# Patient Record
Sex: FEMALE | Race: WHITE | NOT HISPANIC OR LATINO | Employment: FULL TIME | URBAN - METROPOLITAN AREA
[De-identification: names, ages, dates, MRNs, and addresses within clinical notes are randomized per-mention and may not be internally consistent; named-entity substitution may affect disease eponyms.]

---
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
## 2019-03-03 ENCOUNTER — APPOINTMENT (EMERGENCY)
Dept: RADIOLOGY | Facility: HOSPITAL | Age: 19
End: 2019-03-03
Payer: COMMERCIAL

## 2019-03-03 ENCOUNTER — HOSPITAL ENCOUNTER (EMERGENCY)
Facility: HOSPITAL | Age: 19
Discharge: HOME | End: 2019-03-03
Payer: COMMERCIAL

## 2019-03-03 VITALS
BODY MASS INDEX: 34.86 KG/M2 | HEART RATE: 88 BPM | TEMPERATURE: 99.3 F | RESPIRATION RATE: 16 BRPM | WEIGHT: 230 LBS | SYSTOLIC BLOOD PRESSURE: 124 MMHG | DIASTOLIC BLOOD PRESSURE: 72 MMHG | HEIGHT: 68 IN | OXYGEN SATURATION: 99 %

## 2019-03-03 DIAGNOSIS — G89.29 CHRONIC PAIN OF RIGHT KNEE: ICD-10-CM

## 2019-03-03 DIAGNOSIS — M25.561 CHRONIC PAIN OF RIGHT KNEE: ICD-10-CM

## 2019-03-03 DIAGNOSIS — H65.90 SEROUS OTITIS MEDIA: Primary | ICD-10-CM

## 2019-03-03 PROCEDURE — 99283 EMERGENCY DEPT VISIT LOW MDM: CPT

## 2019-03-03 PROCEDURE — 73562 X-RAY EXAM OF KNEE 3: CPT

## 2019-03-03 RX ORDER — DEXTROAMPHETAMINE SACCHARATE, AMPHETAMINE ASPARTATE MONOHYDRATE, DEXTROAMPHETAMINE SULFATE AND AMPHETAMINE SULFATE 7.5; 7.5; 7.5; 7.5 MG/1; MG/1; MG/1; MG/1
1 CAPSULE, EXTENDED RELEASE ORAL DAILY
COMMUNITY
Start: 2016-01-15 | End: 2020-11-23

## 2019-03-03 RX ORDER — NAPROXEN 500 MG/1
500 TABLET ORAL 2 TIMES DAILY WITH MEALS
Qty: 30 TABLET | Refills: 0 | Status: SHIPPED | OUTPATIENT
Start: 2019-03-03 | End: 2020-11-23

## 2019-03-03 NOTE — ED PROVIDER NOTES
History  Chief Complaint   Patient presents with    Knee Pain     right; no injury or trauma to same; hurting "for a long time"    Earache     left     Patient presents with chronic right knee pain that has been worsening over the past few days  Pain is 7/10, constant, over medial joint line and patella, no provocation noted, ibuprofen 600mg last night did not help  Denies known injury, has never seen a provider for this issue  Also c/o 3 day history of left ear feeling clogged, had URI that resolved last week  History provided by:  Parent and patient  Knee Pain   Pain details:     Quality:  Aching    Radiates to:  Does not radiate    Severity:  Moderate    Onset quality:  Gradual    Timing:  Constant    Progression:  Worsening  Chronicity:  Chronic  Dislocation: no    Prior injury to area:  No  Relieved by:  Nothing  Worsened by:  Nothing  Ineffective treatments:  NSAIDs  Associated symptoms: no back pain, no decreased ROM, no fever, no itching and no swelling    Earache   Associated symptoms: no abdominal pain, no congestion, no cough, no diarrhea, no fever, no headaches, no rash, no rhinorrhea, no sore throat and no vomiting        Prior to Admission Medications   Prescriptions Last Dose Informant Patient Reported? Taking? amphetamine-dextroamphetamine (ADDERALL XR) 30 MG 24 hr capsule   Yes Yes   Sig: Take 1 capsule by mouth daily      Facility-Administered Medications: None       Past Medical History:   Diagnosis Date    ADHD (attention deficit hyperactivity disorder)        Past Surgical History:   Procedure Laterality Date    WRIST SURGERY Right        History reviewed  No pertinent family history  I have reviewed and agree with the history as documented      Social History     Tobacco Use    Smoking status: Never Smoker    Smokeless tobacco: Never Used   Substance Use Topics    Alcohol use: Never     Frequency: Never    Drug use: Never        Review of Systems   Constitutional: Negative for chills and fever  HENT: Positive for ear pain  Negative for congestion, rhinorrhea and sore throat  Eyes: Negative for visual disturbance  Respiratory: Negative for cough  Cardiovascular: Negative for chest pain  Gastrointestinal: Negative for abdominal pain, diarrhea, nausea and vomiting  Genitourinary: Negative for dysuria  Musculoskeletal: Negative for back pain  Skin: Negative for itching and rash  Neurological: Negative for headaches  Physical Exam  Physical Exam   Constitutional: She is oriented to person, place, and time  She appears well-developed and well-nourished  HENT:   Head: Normocephalic and atraumatic  Right Ear: External ear normal    Left Ear: External ear normal    Nose: Nose normal    Mouth/Throat: Oropharynx is clear and moist    Left TM with mild dullness and fluid behind   Eyes: Pupils are equal, round, and reactive to light  Conjunctivae and EOM are normal    Neck: Normal range of motion  Neck supple  Cardiovascular: Normal rate, regular rhythm, normal heart sounds and intact distal pulses  Pulmonary/Chest: Effort normal and breath sounds normal    Musculoskeletal: Normal range of motion  She exhibits tenderness  She exhibits no edema  Right knee with intact ROM, non-tender, no ligamentous laxity noted, negative Apley grind   Neurological: She is alert and oriented to person, place, and time  No cranial nerve deficit  Skin: Skin is warm and dry  Capillary refill takes less than 2 seconds  Psychiatric: She has a normal mood and affect  Nursing note and vitals reviewed        Vital Signs  ED Triage Vitals [03/03/19 1318]   Temperature Pulse Respirations Blood Pressure SpO2   99 3 °F (37 4 °C) 88 16 124/72 99 %      Temp Source Heart Rate Source Patient Position - Orthostatic VS BP Location FiO2 (%)   Temporal Monitor Sitting Right arm --      Pain Score       7           Vitals:    03/03/19 1318   BP: 124/72   Pulse: 88   Patient Position - Orthostatic VS: Sitting       Visual Acuity      ED Medications  Medications - No data to display    Diagnostic Studies  Results Reviewed     None                 XR knee 3 views right non injury   ED Interpretation by Angel Hughes PA-C (03/03 1283)   NAD                 Procedures  Procedures       Phone Contacts  ED Phone Contact    ED Course                               MDM    Disposition  Final diagnoses:   Serous otitis media   Chronic pain of right knee     Time reflects when diagnosis was documented in both MDM as applicable and the Disposition within this note     Time User Action Codes Description Comment    3/3/2019  2:29 PM Mik JUNG Add [H65 90] Serous otitis media     3/3/2019  2:29 PM Doug Madelaine Add [M43 753,  G89 29] Chronic pain of right knee       ED Disposition     ED Disposition Condition Date/Time Comment    Discharge Stable Sun Mar 3, 2019  2:29 PM Lashae Plaster discharge to home/self care  Follow-up Information     Follow up With Specialties Details Why Contact Info    Doug Evans MD Orthopedic Surgery, Orthopedics Schedule an appointment as soon as possible for a visit   [address] [place] [address]  [phone]            Patient's Medications   Discharge Prescriptions    NAPROXEN (NAPROSYN) 500 MG TABLET    Take 1 tablet (500 mg total) by mouth 2 (two) times a day with meals       Start Date: 3/3/2019  End Date: --       Order Dose: 500 mg       Quantity: 30 tablet    Refills: 0     No discharge procedures on file      ED Provider  Electronically Signed by           Angel Hughes PA-C  03/03/19 2474

## 2020-10-12 ENCOUNTER — OFFICE VISIT (OUTPATIENT)
Dept: URGENT CARE | Facility: CLINIC | Age: 20
End: 2020-10-12
Payer: COMMERCIAL

## 2020-10-12 ENCOUNTER — HOSPITAL ENCOUNTER (OUTPATIENT)
Dept: RADIOLOGY | Facility: HOSPITAL | Age: 20
Discharge: HOME | End: 2020-10-12

## 2020-10-12 ENCOUNTER — APPOINTMENT (OUTPATIENT)
Dept: URGENT CARE | Facility: CLINIC | Age: 20
End: 2020-10-12
Payer: COMMERCIAL

## 2020-10-12 VITALS
OXYGEN SATURATION: 99 % | TEMPERATURE: 98.7 F | HEART RATE: 88 BPM | SYSTOLIC BLOOD PRESSURE: 139 MMHG | BODY MASS INDEX: 39.24 KG/M2 | HEIGHT: 67 IN | DIASTOLIC BLOOD PRESSURE: 86 MMHG | WEIGHT: 250 LBS | RESPIRATION RATE: 18 BRPM

## 2020-10-12 DIAGNOSIS — S93.491A SPRAIN OF ANTERIOR TALOFIBULAR LIGAMENT OF RIGHT ANKLE, INITIAL ENCOUNTER: Primary | ICD-10-CM

## 2020-10-12 DIAGNOSIS — S93.491A SPRAIN OF ANTERIOR TALOFIBULAR LIGAMENT OF RIGHT ANKLE, INITIAL ENCOUNTER: ICD-10-CM

## 2020-10-12 PROCEDURE — 99213 OFFICE O/P EST LOW 20 MIN: CPT

## 2020-11-23 ENCOUNTER — OFFICE VISIT (OUTPATIENT)
Dept: FAMILY MEDICINE CLINIC | Facility: CLINIC | Age: 20
End: 2020-11-23
Payer: COMMERCIAL

## 2020-11-23 ENCOUNTER — APPOINTMENT (OUTPATIENT)
Dept: LAB | Facility: CLINIC | Age: 20
End: 2020-11-23
Payer: COMMERCIAL

## 2020-11-23 VITALS
OXYGEN SATURATION: 98 % | BODY MASS INDEX: 41.98 KG/M2 | SYSTOLIC BLOOD PRESSURE: 124 MMHG | HEIGHT: 68 IN | WEIGHT: 277 LBS | HEART RATE: 94 BPM | DIASTOLIC BLOOD PRESSURE: 78 MMHG | TEMPERATURE: 97.6 F

## 2020-11-23 DIAGNOSIS — Z00.00 ANNUAL PHYSICAL EXAM: Primary | ICD-10-CM

## 2020-11-23 DIAGNOSIS — Z13.1 DIABETES MELLITUS SCREENING: ICD-10-CM

## 2020-11-23 DIAGNOSIS — Z13.220 LIPID SCREENING: ICD-10-CM

## 2020-11-23 DIAGNOSIS — Z76.89 ENCOUNTER TO ESTABLISH CARE: ICD-10-CM

## 2020-11-23 DIAGNOSIS — Z13.0 SCREENING FOR DEFICIENCY ANEMIA: ICD-10-CM

## 2020-11-23 DIAGNOSIS — E66.01 MORBID OBESITY WITH BMI OF 40.0-44.9, ADULT (HCC): ICD-10-CM

## 2020-11-23 DIAGNOSIS — Z13.29 THYROID DISORDER SCREEN: ICD-10-CM

## 2020-11-23 LAB
ALBUMIN SERPL BCP-MCNC: 4 G/DL (ref 3.5–5)
ALP SERPL-CCNC: 44 U/L (ref 46–116)
ALT SERPL W P-5'-P-CCNC: 19 U/L (ref 12–78)
ANION GAP SERPL CALCULATED.3IONS-SCNC: 6 MMOL/L (ref 4–13)
AST SERPL W P-5'-P-CCNC: 10 U/L (ref 5–45)
BASOPHILS # BLD AUTO: 0.05 THOUSANDS/ΜL (ref 0–0.1)
BASOPHILS NFR BLD AUTO: 1 % (ref 0–1)
BILIRUB SERPL-MCNC: 0.29 MG/DL (ref 0.2–1)
BUN SERPL-MCNC: 13 MG/DL (ref 5–25)
CALCIUM SERPL-MCNC: 9.1 MG/DL (ref 8.3–10.1)
CHLORIDE SERPL-SCNC: 108 MMOL/L (ref 100–108)
CHOLEST SERPL-MCNC: 192 MG/DL (ref 50–200)
CO2 SERPL-SCNC: 24 MMOL/L (ref 21–32)
CREAT SERPL-MCNC: 0.79 MG/DL (ref 0.6–1.3)
EOSINOPHIL # BLD AUTO: 0.12 THOUSAND/ΜL (ref 0–0.61)
EOSINOPHIL NFR BLD AUTO: 2 % (ref 0–6)
ERYTHROCYTE [DISTWIDTH] IN BLOOD BY AUTOMATED COUNT: 11.9 % (ref 11.6–15.1)
EST. AVERAGE GLUCOSE BLD GHB EST-MCNC: 94 MG/DL
GFR SERPL CREATININE-BSD FRML MDRD: 107 ML/MIN/1.73SQ M
GLUCOSE P FAST SERPL-MCNC: 76 MG/DL (ref 65–99)
HBA1C MFR BLD: 4.9 %
HCT VFR BLD AUTO: 42.4 % (ref 34.8–46.1)
HDLC SERPL-MCNC: 68 MG/DL
HGB BLD-MCNC: 13.8 G/DL (ref 11.5–15.4)
IMM GRANULOCYTES # BLD AUTO: 0.01 THOUSAND/UL (ref 0–0.2)
IMM GRANULOCYTES NFR BLD AUTO: 0 % (ref 0–2)
LDLC SERPL CALC-MCNC: 98 MG/DL (ref 0–100)
LYMPHOCYTES # BLD AUTO: 2.24 THOUSANDS/ΜL (ref 0.6–4.47)
LYMPHOCYTES NFR BLD AUTO: 31 % (ref 14–44)
MCH RBC QN AUTO: 30 PG (ref 26.8–34.3)
MCHC RBC AUTO-ENTMCNC: 32.5 G/DL (ref 31.4–37.4)
MCV RBC AUTO: 92 FL (ref 82–98)
MONOCYTES # BLD AUTO: 0.47 THOUSAND/ΜL (ref 0.17–1.22)
MONOCYTES NFR BLD AUTO: 6 % (ref 4–12)
NEUTROPHILS # BLD AUTO: 4.4 THOUSANDS/ΜL (ref 1.85–7.62)
NEUTS SEG NFR BLD AUTO: 60 % (ref 43–75)
NONHDLC SERPL-MCNC: 124 MG/DL
NRBC BLD AUTO-RTO: 0 /100 WBCS
PLATELET # BLD AUTO: 384 THOUSANDS/UL (ref 149–390)
PMV BLD AUTO: 9.8 FL (ref 8.9–12.7)
POTASSIUM SERPL-SCNC: 4.1 MMOL/L (ref 3.5–5.3)
PROT SERPL-MCNC: 8.1 G/DL (ref 6.4–8.2)
RBC # BLD AUTO: 4.6 MILLION/UL (ref 3.81–5.12)
SODIUM SERPL-SCNC: 138 MMOL/L (ref 136–145)
T4 FREE SERPL-MCNC: 0.93 NG/DL (ref 0.76–1.46)
TRIGL SERPL-MCNC: 129 MG/DL
TSH SERPL DL<=0.05 MIU/L-ACNC: 5.65 UIU/ML (ref 0.36–3.74)
WBC # BLD AUTO: 7.29 THOUSAND/UL (ref 4.31–10.16)

## 2020-11-23 PROCEDURE — 3725F SCREEN DEPRESSION PERFORMED: CPT | Performed by: PHYSICIAN ASSISTANT

## 2020-11-23 PROCEDURE — 80061 LIPID PANEL: CPT

## 2020-11-23 PROCEDURE — 3008F BODY MASS INDEX DOCD: CPT | Performed by: PHYSICIAN ASSISTANT

## 2020-11-23 PROCEDURE — 36415 COLL VENOUS BLD VENIPUNCTURE: CPT

## 2020-11-23 PROCEDURE — 85025 COMPLETE CBC W/AUTO DIFF WBC: CPT

## 2020-11-23 PROCEDURE — 80053 COMPREHEN METABOLIC PANEL: CPT

## 2020-11-23 PROCEDURE — 99385 PREV VISIT NEW AGE 18-39: CPT | Performed by: PHYSICIAN ASSISTANT

## 2020-11-23 PROCEDURE — 83036 HEMOGLOBIN GLYCOSYLATED A1C: CPT

## 2020-11-23 PROCEDURE — 84439 ASSAY OF FREE THYROXINE: CPT

## 2020-11-23 PROCEDURE — 1036F TOBACCO NON-USER: CPT | Performed by: PHYSICIAN ASSISTANT

## 2020-11-23 PROCEDURE — 84443 ASSAY THYROID STIM HORMONE: CPT

## 2020-11-23 RX ORDER — NORETHINDRONE ACETATE AND ETHINYL ESTRADIOL 1.5-30(21)
1 KIT ORAL DAILY
COMMUNITY
Start: 2020-06-23 | End: 2021-06-02 | Stop reason: SDUPTHER

## 2020-12-14 ENCOUNTER — TELEMEDICINE (OUTPATIENT)
Dept: FAMILY MEDICINE CLINIC | Facility: CLINIC | Age: 20
End: 2020-12-14
Payer: COMMERCIAL

## 2020-12-14 DIAGNOSIS — E03.8 SUBCLINICAL HYPOTHYROIDISM: Primary | ICD-10-CM

## 2020-12-14 DIAGNOSIS — R63.5 WEIGHT GAIN: ICD-10-CM

## 2020-12-14 PROCEDURE — 99213 OFFICE O/P EST LOW 20 MIN: CPT | Performed by: PHYSICIAN ASSISTANT

## 2021-01-07 ENCOUNTER — LAB (OUTPATIENT)
Dept: LAB | Facility: CLINIC | Age: 21
End: 2021-01-07
Payer: COMMERCIAL

## 2021-01-07 DIAGNOSIS — R63.5 WEIGHT GAIN: ICD-10-CM

## 2021-01-07 DIAGNOSIS — E03.8 SUBCLINICAL HYPOTHYROIDISM: ICD-10-CM

## 2021-01-07 LAB
T3 SERPL-MCNC: 1.8 NG/ML (ref 0.6–1.8)
T4 FREE SERPL-MCNC: 0.98 NG/DL (ref 0.76–1.46)
TSH SERPL DL<=0.05 MIU/L-ACNC: 6.66 UIU/ML (ref 0.36–3.74)

## 2021-01-07 PROCEDURE — 36415 COLL VENOUS BLD VENIPUNCTURE: CPT

## 2021-01-07 PROCEDURE — 84439 ASSAY OF FREE THYROXINE: CPT

## 2021-01-07 PROCEDURE — 84480 ASSAY TRIIODOTHYRONINE (T3): CPT

## 2021-01-07 PROCEDURE — 84443 ASSAY THYROID STIM HORMONE: CPT

## 2021-01-11 ENCOUNTER — TELEMEDICINE (OUTPATIENT)
Dept: FAMILY MEDICINE CLINIC | Facility: CLINIC | Age: 21
End: 2021-01-11
Payer: COMMERCIAL

## 2021-01-11 DIAGNOSIS — E03.8 SUBCLINICAL HYPOTHYROIDISM: Primary | ICD-10-CM

## 2021-01-11 DIAGNOSIS — R63.5 WEIGHT GAIN: ICD-10-CM

## 2021-01-11 PROCEDURE — 99213 OFFICE O/P EST LOW 20 MIN: CPT | Performed by: PHYSICIAN ASSISTANT

## 2021-01-11 PROCEDURE — 1036F TOBACCO NON-USER: CPT | Performed by: PHYSICIAN ASSISTANT

## 2021-01-11 RX ORDER — LEVOTHYROXINE SODIUM 0.03 MG/1
25 TABLET ORAL DAILY
Qty: 90 TABLET | Refills: 0 | Status: SHIPPED | OUTPATIENT
Start: 2021-01-11 | End: 2021-03-15 | Stop reason: SDUPTHER

## 2021-01-11 NOTE — PROGRESS NOTES
Virtual Regular Visit      Assessment/Plan:    Problem List Items Addressed This Visit     None      Visit Diagnoses     Subclinical hypothyroidism    -  Primary    Relevant Medications    levothyroxine 25 mcg tablet  tsh continues to rise and t4 on low end of normal; will elect to start low dose synthroid as patient does experience some symptoms that could be correlated to thyroid disease  - start taking in mornings on empty stomach, repeat labs in 2 months     Other Relevant Orders    TSH, 3rd generation    T4, free    Thyroid Antibodies Panel    Weight gain        Relevant Medications    levothyroxine 25 mcg tablet    Other Relevant Orders    TSH, 3rd generation    T4, free    Thyroid Antibodies Panel               Reason for visit is No chief complaint on file  Encounter provider Carloz Lincoln PA-C    Provider located at [address] [place] [address]  [address] [facility] [id number]      Recent Visits  No visits were found meeting these conditions  Showing recent visits within past 7 days and meeting all other requirements     Future Appointments  No visits were found meeting these conditions  Showing future appointments within next 150 days and meeting all other requirements        The patient was identified by name and date of birth  Isadora Brittle was informed that this is a telemedicine visit and that the visit is being conducted through [facility] and patient was informed that this is a secure, HIPAA-compliant platform  She agrees to proceed     My office door was closed  No one else was in the room  She acknowledged consent and understanding of privacy and security of the video platform  The patient has agreed to participate and understands they can discontinue the visit at any time  Patient is aware this is a billable service  Subjective  Isadora Brittle is a 23 y o  female who presents for 1 month follow up      HPI   Patient has repeat thyroid labs - tsh still above goal and higher than first  Her t4 is on low end of normal  She does struggle with weight gain and inability to lose  She does have poor growth in hair and nails  Past Medical History:   Diagnosis Date    ADHD (attention deficit hyperactivity disorder)        Past Surgical History:   Procedure Laterality Date    FRACTURE SURGERY      WRIST SURGERY Right        Current Outpatient Medications   Medication Sig Dispense Refill    levothyroxine 25 mcg tablet Take 1 tablet (25 mcg total) by mouth daily 90 tablet 0    norethindrone-ethinyl estradiol-iron (MICROGESTIN FE1 5/30) 1 5-30 MG-MCG tablet Take 1 tablet by mouth daily       No current facility-administered medications for this visit  No Known Allergies    Review of Systems   Constitutional: Positive for unexpected weight change  Negative for appetite change and diaphoresis  Respiratory: Negative  Cardiovascular: Negative  Skin:        Hair and nails poor growth   Neurological: Negative  Video Exam    There were no vitals filed for this visit  Physical Exam  Constitutional:       General: She is not in acute distress  Appearance: Normal appearance  She is obese  HENT:      Head: Normocephalic and atraumatic  Eyes:      Conjunctiva/sclera: Conjunctivae normal       Pupils: Pupils are equal, round, and reactive to light  Pulmonary:      Effort: Pulmonary effort is normal  No respiratory distress  Musculoskeletal:         General: No deformity  Skin:     General: Skin is dry  Coloration: Skin is not pale  Neurological:      General: No focal deficit present  Mental Status: She is alert and oriented to person, place, and time  Psychiatric:         Mood and Affect: Mood normal           I spent 10 minutes directly with the patient during this visit      VIRTUAL VISIT DISCLAIMER    Forest Tobi acknowledges that she has consented to an online visit or consultation   She understands that the online visit is based solely on information provided by her, and that, in the absence of a face-to-face physical evaluation by the physician, the diagnosis she receives is both limited and provisional in terms of accuracy and completeness  This is not intended to replace a full medical face-to-face evaluation by the physician  Sarai Presume understands and accepts these terms

## 2021-02-08 ENCOUNTER — TELEPHONE (OUTPATIENT)
Dept: FAMILY MEDICINE CLINIC | Facility: CLINIC | Age: 21
End: 2021-02-08

## 2021-02-08 DIAGNOSIS — Z20.822 EXPOSURE TO COVID-19 VIRUS: ICD-10-CM

## 2021-02-08 DIAGNOSIS — B34.9 VIRAL INFECTION, UNSPECIFIED: ICD-10-CM

## 2021-02-08 PROCEDURE — U0005 INFEC AGEN DETEC AMPLI PROBE: HCPCS | Performed by: PHYSICIAN ASSISTANT

## 2021-02-08 PROCEDURE — U0003 INFECTIOUS AGENT DETECTION BY NUCLEIC ACID (DNA OR RNA); SEVERE ACUTE RESPIRATORY SYNDROME CORONAVIRUS 2 (SARS-COV-2) (CORONAVIRUS DISEASE [COVID-19]), AMPLIFIED PROBE TECHNIQUE, MAKING USE OF HIGH THROUGHPUT TECHNOLOGIES AS DESCRIBED BY CMS-2020-01-R: HCPCS | Performed by: PHYSICIAN ASSISTANT

## 2021-02-08 NOTE — TELEPHONE ENCOUNTER
When was your exposure? Where was your exposure? At home      What type of exposure? Masked or unmasked, how long were you in contact, were you within 6 feet  No masks and within 6 feet- they all live together      Was it a confirmed exposure or was it a suspected exposure? Confirmed- boyfriend and boyfriends mother- 2/7      Have you started with symptoms? If so, what symptoms do you have and on what date did they start? Symptoms started on 2/3- Coughing, sneezing and headache

## 2021-02-08 NOTE — TELEPHONE ENCOUNTER
Covid testing ordered, recommend virtual visit if needed  Supportive care with tylenol, motrin, vit d/c/zinc  Needs to isolate x 10 days from symptom onset

## 2021-02-09 LAB — SARS-COV-2 RNA RESP QL NAA+PROBE: NEGATIVE

## 2021-02-12 ENCOUNTER — TELEPHONE (OUTPATIENT)
Dept: FAMILY MEDICINE CLINIC | Facility: CLINIC | Age: 21
End: 2021-02-12

## 2021-02-12 NOTE — TELEPHONE ENCOUNTER
Pt called that she needs a work note that she has to quarantine and extra 10 days due to household exposures  She is negative

## 2021-02-12 NOTE — TELEPHONE ENCOUNTER
Last day of quarantine for family members is 2/17/2021   Nannette Hyman must quarantine 10 days past that date making her able to return to work on 2/28/2021

## 2021-03-15 ENCOUNTER — OFFICE VISIT (OUTPATIENT)
Dept: FAMILY MEDICINE CLINIC | Facility: CLINIC | Age: 21
End: 2021-03-15
Payer: COMMERCIAL

## 2021-03-15 VITALS
BODY MASS INDEX: 44.1 KG/M2 | HEIGHT: 68 IN | TEMPERATURE: 98.8 F | OXYGEN SATURATION: 97 % | DIASTOLIC BLOOD PRESSURE: 86 MMHG | SYSTOLIC BLOOD PRESSURE: 124 MMHG | HEART RATE: 96 BPM | WEIGHT: 291 LBS

## 2021-03-15 DIAGNOSIS — E03.8 SUBCLINICAL HYPOTHYROIDISM: ICD-10-CM

## 2021-03-15 DIAGNOSIS — R63.5 EXCESSIVE BODY WEIGHT GAIN: ICD-10-CM

## 2021-03-15 DIAGNOSIS — F90.0 ATTENTION DEFICIT HYPERACTIVITY DISORDER (ADHD), PREDOMINANTLY INATTENTIVE TYPE: ICD-10-CM

## 2021-03-15 DIAGNOSIS — Z12.4 SCREENING FOR CERVICAL CANCER: ICD-10-CM

## 2021-03-15 DIAGNOSIS — R63.5 WEIGHT GAIN: Primary | ICD-10-CM

## 2021-03-15 PROCEDURE — 99214 OFFICE O/P EST MOD 30 MIN: CPT | Performed by: PHYSICIAN ASSISTANT

## 2021-03-15 RX ORDER — LEVOTHYROXINE SODIUM 0.03 MG/1
25 TABLET ORAL DAILY
Qty: 90 TABLET | Refills: 0 | Status: SHIPPED | OUTPATIENT
Start: 2021-03-15 | End: 2021-07-01 | Stop reason: SDUPTHER

## 2021-03-15 RX ORDER — DEXTROAMPHETAMINE SACCHARATE, AMPHETAMINE ASPARTATE MONOHYDRATE, DEXTROAMPHETAMINE SULFATE AND AMPHETAMINE SULFATE 3.75; 3.75; 3.75; 3.75 MG/1; MG/1; MG/1; MG/1
15 CAPSULE, EXTENDED RELEASE ORAL EVERY MORNING
Qty: 30 CAPSULE | Refills: 0 | Status: SHIPPED | OUTPATIENT
Start: 2021-03-15 | End: 2021-03-23 | Stop reason: SDUPTHER

## 2021-03-15 NOTE — PROGRESS NOTES
Routine Follow-up    Deyanira Powell 23 y o  female   Date:  3/15/2021      Assessment and Plan:    Tea Hawk was seen today for follow-up  Diagnoses and all orders for this visit:    Weight gain  -     levothyroxine 25 mcg tablet; Take 1 tablet (25 mcg total) by mouth daily  -     Ambulatory referral to Endocrinology; Future  -     Ambulatory referral to Weight Management; Future    Screening for cervical cancer    Subclinical hypothyroidism  -     levothyroxine 25 mcg tablet; Take 1 tablet (25 mcg total) by mouth daily  -     Ambulatory referral to Endocrinology; Future  - reprinted repeat thyroid studies to complete at earliest convenience    BMI 40 0-44 9, adult [facility])  -     Ambulatory referral to Endocrinology; Future  -     Ambulatory referral to Weight Management; Future    Excessive body weight gain  -     Ambulatory referral to Endocrinology; Future  -     Ambulatory referral to Weight Management; Future  - she admits to needing to improve lifestyle but nothing has changed drastically to explain her weight gain finding    Attention deficit hyperactivity disorder (ADHD), predominantly inattentive type  -     amphetamine-dextroamphetamine (ADDERALL XR) 15 MG 24 hr capsule; Take 1 capsule (15 mg total) by mouth every morningMax Daily Amount: 15 mg  - will resume 15 mg tablet and continue to monitor and adjust dose prn   - follow up in 4 weeks         BMI Counseling: Body mass index is 44 25 kg/m²  The BMI is above normal  Nutrition recommendations include decreasing portion sizes, encouraging healthy choices of fruits and vegetables and moderation in carbohydrate intake  Exercise recommendations include exercising 3-5 times per week  Patient referred to weight management due to patient being overweight  HPI:  Chief Complaint   Patient presents with    Follow-up     No concerns today  HPI   Patient is a 22 yo female who presents for 2 month follow up   At last visit, her thyroid was subclinical  We started low dose of synthroid which she is tolerating without issue  Unfortunately, she did not complete repeat labs studies prior to visit  She continues to notice rapid weight gain  She has gained 40 lbs since Oct  She has not changed her diet or activity much to explain the rapid weight gain  However, she also could improve her lifestyle choices  She cut out soda but has not been super disciplined  She is open to weight management services  When she started birth control at age 11, it has been harder to control her weight since being on birth control  But she has never had rapid weight gain like this  There is family hx of obesity  She lacks regular exercise  She is going to be starting a new job and would like to resume adderral  She has known hx of ADHD and was on adderral in the past and through school  She admits to being hyper but she predominantly struggles with attention and completing her work  ROS: Review of Systems   Constitutional: Positive for unexpected weight change  Negative for activity change, appetite change, chills and fatigue  Respiratory: Negative  Cardiovascular: Negative  Genitourinary: Negative  Skin: Negative  Neurological: Negative          Past Medical History:   Diagnosis Date    ADHD (attention deficit hyperactivity disorder)      Patient Active Problem List   Diagnosis    General counseling and advice for contraceptive management       Past Surgical History:   Procedure Laterality Date    FRACTURE SURGERY      WRIST SURGERY Right        Social History     Socioeconomic History    Marital status: Single     Spouse name: None    Number of children: None    Years of education: None    Highest education level: None   Occupational History    None   Social Needs    Financial resource strain: None    Food insecurity     Worry: None     Inability: None    Transportation needs     Medical: No     Non-medical: No   Tobacco Use    Smoking status: Never Smoker  Smokeless tobacco: Current User   Substance and Sexual Activity    Alcohol use: Yes     Comment: socially    Drug use: Never    Sexual activity: Yes     Partners: Male   Lifestyle    Physical activity     Days per week: None     Minutes per session: None    Stress: None   Relationships    Social connections     Talks on phone: None     Gets together: None     Attends Restoration service: None     Active member of club or organization: None     Attends meetings of clubs or organizations: None     Relationship status: None    Intimate partner violence     Fear of current or ex partner: None     Emotionally abused: None     Physically abused: None     Forced sexual activity: None   Other Topics Concern    None   Social History Narrative    None       Family History   Problem Relation Age of Onset    Heart disease Paternal Grandfather        No Known Allergies      Current Outpatient Medications:     norethindrone-ethinyl estradiol-iron (Deejay Slot FE1 5/30) 1 5-30 MG-MCG tablet, Take 1 tablet by mouth daily, Disp: , Rfl:     amphetamine-dextroamphetamine (ADDERALL XR) 15 MG 24 hr capsule, Take 1 capsule (15 mg total) by mouth every morningMax Daily Amount: 15 mg, Disp: 30 capsule, Rfl: 0    levothyroxine 25 mcg tablet, Take 1 tablet (25 mcg total) by mouth daily, Disp: 90 tablet, Rfl: 0      Physical Exam:  /86 (BP Location: Left arm, Patient Position: Sitting, Cuff Size: Large)   Pulse 96   Temp 98 8 °F (37 1 °C) (Tympanic)   Ht 5' 8" (1 727 m)   Wt 132 kg (291 lb)   SpO2 97%   BMI 44 25 kg/m²     Physical Exam  Constitutional:       General: She is not in acute distress  Appearance: She is obese  She is not diaphoretic  HENT:      Head: Normocephalic and atraumatic        Right Ear: Tympanic membrane, ear canal and external ear normal       Left Ear: Tympanic membrane, ear canal and external ear normal    Eyes:      Conjunctiva/sclera: Conjunctivae normal       Pupils: Pupils are equal, round, and reactive to light  Cardiovascular:      Rate and Rhythm: Normal rate and regular rhythm  Heart sounds: No murmur  Pulmonary:      Effort: Pulmonary effort is normal  No respiratory distress  Breath sounds: Normal breath sounds  Musculoskeletal:         General: No deformity  Right lower leg: No edema  Left lower leg: No edema  Skin:     General: Skin is warm and dry  Findings: No rash  Neurological:      General: No focal deficit present  Mental Status: She is alert and oriented to person, place, and time  Cranial Nerves: No cranial nerve deficit     Psychiatric:         Mood and Affect: Mood normal          Behavior: Behavior normal          Judgment: Judgment normal            Labs:  Lab Results   Component Value Date    WBC 7 29 11/23/2020    HGB 13 8 11/23/2020    HCT 42 4 11/23/2020    MCV 92 11/23/2020     11/23/2020     Lab Results   Component Value Date    K 4 1 11/23/2020     11/23/2020    CO2 24 11/23/2020    BUN 13 11/23/2020    CREATININE 0 79 11/23/2020    GLUF 76 11/23/2020    CALCIUM 9 1 11/23/2020    AST 10 11/23/2020    ALT 19 11/23/2020    ALKPHOS 44 (L) 11/23/2020    EGFR 107 11/23/2020

## 2021-03-16 ENCOUNTER — TELEPHONE (OUTPATIENT)
Dept: FAMILY MEDICINE CLINIC | Facility: CLINIC | Age: 21
End: 2021-03-16

## 2021-03-16 NOTE — TELEPHONE ENCOUNTER
Prior authorization started on covermymeds  Awaiting response form insurance company   Key code is [id number]

## 2021-03-16 NOTE — TELEPHONE ENCOUNTER
Patient contacted office stating that the pharmacy informed her that a prior authorization is needed from [facility] for Adderall

## 2021-03-18 NOTE — TELEPHONE ENCOUNTER
Patient called asking about Adderall that needs prior auth  There is a letter from [employer] that it was denied  What should be done next? Patient is awaiting to hear from us for guidance  Thank you

## 2021-03-19 NOTE — TELEPHONE ENCOUNTER
Her medication was denied per the letter because of a whole bunch of specific documentation that is needed in her chart  This is not a new diagnosis for her but unfortunately, she will need to make a new visit to discuss all the documentation they are requiring  Please make this at earliest convenience   This can be virtual

## 2021-03-23 ENCOUNTER — OFFICE VISIT (OUTPATIENT)
Dept: FAMILY MEDICINE CLINIC | Facility: CLINIC | Age: 21
End: 2021-03-23
Payer: COMMERCIAL

## 2021-03-23 VITALS
DIASTOLIC BLOOD PRESSURE: 88 MMHG | RESPIRATION RATE: 17 BRPM | BODY MASS INDEX: 44.41 KG/M2 | HEART RATE: 86 BPM | WEIGHT: 293 LBS | TEMPERATURE: 98.7 F | HEIGHT: 68 IN | OXYGEN SATURATION: 98 % | SYSTOLIC BLOOD PRESSURE: 132 MMHG

## 2021-03-23 DIAGNOSIS — F90.0 ATTENTION DEFICIT HYPERACTIVITY DISORDER (ADHD), PREDOMINANTLY INATTENTIVE TYPE: ICD-10-CM

## 2021-03-23 PROCEDURE — 99213 OFFICE O/P EST LOW 20 MIN: CPT | Performed by: PHYSICIAN ASSISTANT

## 2021-03-23 PROCEDURE — 1036F TOBACCO NON-USER: CPT | Performed by: PHYSICIAN ASSISTANT

## 2021-03-23 PROCEDURE — 3008F BODY MASS INDEX DOCD: CPT | Performed by: PHYSICIAN ASSISTANT

## 2021-03-23 RX ORDER — DEXTROAMPHETAMINE SACCHARATE, AMPHETAMINE ASPARTATE MONOHYDRATE, DEXTROAMPHETAMINE SULFATE AND AMPHETAMINE SULFATE 3.75; 3.75; 3.75; 3.75 MG/1; MG/1; MG/1; MG/1
15 CAPSULE, EXTENDED RELEASE ORAL EVERY MORNING
Qty: 30 CAPSULE | Refills: 0 | Status: SHIPPED | OUTPATIENT
Start: 2021-03-23 | End: 2021-05-12 | Stop reason: SDUPTHER

## 2021-03-23 NOTE — PROGRESS NOTES
Routine Follow-up    Danielle Cespedes 24 y o  female   Date:  3/23/2021      Assessment and Plan:    Aundrea Demarco was seen today for follow-up and medication management  Diagnoses and all orders for this visit:    Attention deficit hyperactivity disorder (ADHD), predominantly inattentive type  -     amphetamine-dextroamphetamine (ADDERALL XR) 15 MG 24 hr capsule; Take 1 capsule (15 mg total) by mouth every morningMax Daily Amount: 15 mg  - known diagnosis since 5th grade; diagnosis still applies as has more than 6 inattentive symptoms based on DSM 5 criteria as noted in hpi  - no family hx of drug addiction  - patient has chronically been on adderall from 5th grade through college without issue or misuse  - patient was educated on side effects and risks of taking this medication and she understands this is a controlled substance that has risk of abuse, misuse, addiction  - PDMP was reviewed without red flags        HPI:  Chief Complaint   Patient presents with    Follow-up     Will schedule with gyn for pap     Medication Management     Discuss prior authorization denial for adderall      HPI   Patient is a 23 yo female who presents for follow up to discuss medication management for ADHD  She was diagnosed and treated with ADHD since 5th grade  She had been on adderal from 5th grade through college  After college, she stopped adderal without issue  She recently started a new job and appreciates how this is affecting her concentration and productivity at work and would like to resume this medication  She does also appreciate that it affects her ability to complete chores around the house, even also affects relationships because she overthinks  She struggles with attention, often makes mistakes in work  She has a hard time holding her attention while doing tasks, such as talking on the phone at work  Sometimes, she has a hard time listening especially when other distractions  She has trouble organizing   She will put off tasks at home because of effort required to complete it  She often gets distracted and can be forgetful at times  She is able to finish projects, really has to focus  When she was younger she struggles more with hyperactivity but has been able to control this as an adult  She is often figdeting and cannot sit still, not as bad as used to be  She is very much on the go,  does not know where she gets her energy from  She sometimes feels she could talk excessively, sometimes blurts things out in conversation  She has no previous mental health diagnosis  She has been having a lot of anxiety in regards to pandemic, worrying she is getting sick  ROS: Review of Systems   Constitutional: Negative for appetite change, diaphoresis and fever  Respiratory: Negative  Cardiovascular: Negative  Neurological: Negative  Psychiatric/Behavioral: Positive for decreased concentration  Negative for agitation, behavioral problems, confusion, dysphoric mood, hallucinations, self-injury, sleep disturbance and suicidal ideas  The patient is nervous/anxious and is hyperactive          Past Medical History:   Diagnosis Date    ADHD (attention deficit hyperactivity disorder)      Patient Active Problem List   Diagnosis    General counseling and advice for contraceptive management       Past Surgical History:   Procedure Laterality Date    FRACTURE SURGERY      WRIST SURGERY Right        Social History     Socioeconomic History    Marital status: Single     Spouse name: None    Number of children: None    Years of education: None    Highest education level: None   Occupational History    None   Social Needs    Financial resource strain: None    Food insecurity     Worry: None     Inability: None    Transportation needs     Medical: No     Non-medical: No   Tobacco Use    Smoking status: Never Smoker    Smokeless tobacco: Current User   Substance and Sexual Activity    Alcohol use: Yes     Comment: socially    Drug use: Never    Sexual activity: Yes     Partners: Male   Lifestyle    Physical activity     Days per week: None     Minutes per session: None    Stress: None   Relationships    Social connections     Talks on phone: None     Gets together: None     Attends Religion service: None     Active member of club or organization: None     Attends meetings of clubs or organizations: None     Relationship status: None    Intimate partner violence     Fear of current or ex partner: None     Emotionally abused: None     Physically abused: None     Forced sexual activity: None   Other Topics Concern    None   Social History Narrative    None       Family History   Problem Relation Age of Onset    Heart disease Paternal Grandfather        No Known Allergies      Current Outpatient Medications:     levothyroxine 25 mcg tablet, Take 1 tablet (25 mcg total) by mouth daily, Disp: 90 tablet, Rfl: 0    norethindrone-ethinyl estradiol-iron (MICROGESTIN FE1 5/30) 1 5-30 MG-MCG tablet, Take 1 tablet by mouth daily, Disp: , Rfl:     amphetamine-dextroamphetamine (ADDERALL XR) 15 MG 24 hr capsule, Take 1 capsule (15 mg total) by mouth every morningMax Daily Amount: 15 mg, Disp: 30 capsule, Rfl: 0      Physical Exam:  /88 (BP Location: Left arm, Patient Position: Sitting)   Pulse 86   Temp 98 7 °F (37 1 °C)   Resp 17   Ht 5' 8" (1 727 m)   Wt 135 kg (297 lb)   SpO2 98%   BMI 45 16 kg/m²     Physical Exam  Constitutional:       General: She is not in acute distress  Appearance: Normal appearance  She is obese  Cardiovascular:      Rate and Rhythm: Normal rate and regular rhythm  Pulmonary:      Effort: Pulmonary effort is normal  No respiratory distress  Neurological:      Mental Status: She is alert  Psychiatric:         Attention and Perception: Perception normal  She is inattentive           Mood and Affect: Mood and affect normal          Speech: Speech normal          Behavior: Behavior normal          Thought Content:  Thought content normal          Judgment: Judgment normal            Labs:  Lab Results   Component Value Date    WBC 7 29 11/23/2020    HGB 13 8 11/23/2020    HCT 42 4 11/23/2020    MCV 92 11/23/2020     11/23/2020     Lab Results   Component Value Date    K 4 1 11/23/2020     11/23/2020    CO2 24 11/23/2020    BUN 13 11/23/2020    CREATININE 0 79 11/23/2020    GLUF 76 11/23/2020    CALCIUM 9 1 11/23/2020    AST 10 11/23/2020    ALT 19 11/23/2020    ALKPHOS 44 (L) 11/23/2020    EGFR 107 11/23/2020

## 2021-04-21 ENCOUNTER — TELEPHONE (OUTPATIENT)
Dept: BARIATRICS | Facility: CLINIC | Age: 21
End: 2021-04-21

## 2021-05-06 ENCOUNTER — CONSULT (OUTPATIENT)
Dept: BARIATRICS | Facility: CLINIC | Age: 21
End: 2021-05-06
Payer: COMMERCIAL

## 2021-05-06 ENCOUNTER — HOSPITAL ENCOUNTER (EMERGENCY)
Facility: HOSPITAL | Age: 21
Discharge: HOME | End: 2021-05-06
Attending: EMERGENCY MEDICINE | Admitting: EMERGENCY MEDICINE
Payer: COMMERCIAL

## 2021-05-06 VITALS
OXYGEN SATURATION: 100 % | DIASTOLIC BLOOD PRESSURE: 102 MMHG | HEIGHT: 68 IN | HEART RATE: 105 BPM | TEMPERATURE: 98.6 F | WEIGHT: 293 LBS | SYSTOLIC BLOOD PRESSURE: 139 MMHG | BODY MASS INDEX: 44.41 KG/M2 | RESPIRATION RATE: 20 BRPM

## 2021-05-06 VITALS
TEMPERATURE: 98.6 F | HEART RATE: 99 BPM | BODY MASS INDEX: 44.41 KG/M2 | SYSTOLIC BLOOD PRESSURE: 134 MMHG | HEIGHT: 68 IN | DIASTOLIC BLOOD PRESSURE: 78 MMHG | RESPIRATION RATE: 20 BRPM | WEIGHT: 293 LBS

## 2021-05-06 DIAGNOSIS — F90.8 ATTENTION DEFICIT HYPERACTIVITY DISORDER (ADHD), OTHER TYPE: ICD-10-CM

## 2021-05-06 DIAGNOSIS — L03.317 CELLULITIS OF BUTTOCK: Primary | ICD-10-CM

## 2021-05-06 DIAGNOSIS — R06.83 SNORING: ICD-10-CM

## 2021-05-06 DIAGNOSIS — E66.01 MORBID OBESITY WITH BMI OF 45.0-49.9, ADULT (HCC): Primary | ICD-10-CM

## 2021-05-06 DIAGNOSIS — Z91.89 AT RISK FOR SLEEP APNEA: ICD-10-CM

## 2021-05-06 PROCEDURE — 99284 EMERGENCY DEPT VISIT MOD MDM: CPT | Performed by: EMERGENCY MEDICINE

## 2021-05-06 PROCEDURE — 99204 OFFICE O/P NEW MOD 45 MIN: CPT | Performed by: PHYSICIAN ASSISTANT

## 2021-05-06 PROCEDURE — 99281 EMR DPT VST MAYX REQ PHY/QHP: CPT

## 2021-05-06 RX ORDER — CEPHALEXIN 500 MG/1
500 CAPSULE ORAL EVERY 6 HOURS SCHEDULED
Qty: 28 CAPSULE | Refills: 0 | Status: SHIPPED | OUTPATIENT
Start: 2021-05-06 | End: 2021-05-13

## 2021-05-06 RX ORDER — CEPHALEXIN 500 MG/1
500 CAPSULE ORAL ONCE
Status: COMPLETED | OUTPATIENT
Start: 2021-05-06 | End: 2021-05-06

## 2021-05-06 RX ADMIN — CEPHALEXIN 500 MG: 500 CAPSULE ORAL at 22:34

## 2021-05-06 NOTE — ASSESSMENT & PLAN NOTE
-Discussed options of [facility]-Intensive Lifestyle Intervention Program, Very Low Calorie Diet-VLCD, Conservative Program, Marquita-En-Y Gastric Bypass and Vertical Sleeve Gastrectomy and the role of weight loss medications   -Initial weight loss goal of 5-10% weight loss for improved health  -Avoid phentermine: taking adderall  -Screening labs: reviewed/UTD  -Patient is interested in pursuing bariatric surgery  -Recommended she watch online seminar

## 2021-05-06 NOTE — PROGRESS NOTES
Assessment/Plan: Morbid obesity with BMI of 45 0-49 9, adult ([facility] )  -Discussed options of [facility]-Intensive Lifestyle Intervention Program, Very Low Calorie Diet-VLCD, Conservative Program, Marquita-En-Y Gastric Bypass and Vertical Sleeve Gastrectomy and the role of weight loss medications   -Initial weight loss goal of 5-10% weight loss for improved health  -Avoid phentermine: taking adderall  -Screening labs: reviewed/UTD  -Patient is interested in pursuing bariatric surgery  -Recommended she watch online seminar    Acquired hypothyroidism  -TSH elevated, normal T4  -due for repeat this week  -continue management with PCP    Follow up: 3 hour surgical eval     Diagnoses and all orders for this visit:    Morbid obesity with BMI of 45 0-49 9, adult ([facility] )  -     Ambulatory referral to Weight Management  -     Ambulatory referral to Sleep Medicine; Future    At risk for sleep apnea  -     Ambulatory referral to Weight Management  -     Ambulatory referral to Sleep Medicine; Future    Attention deficit hyperactivity disorder (ADHD), other type  -     Ambulatory referral to Sleep Medicine; Future    Snoring  -     Ambulatory referral to Sleep Medicine; Future        Subjective:   Chief Complaint   Patient presents with   [address] [place] [address] Patient - [facility] consult      Patient ID: Lashae Farah  is a 24 y o  female with excess weight/obesity here to pursue weight management    Past Medical History:   Diagnosis Date    ADHD (attention deficit hyperactivity disorder)      HPI:  Obesity/Excess Weight:  Severity: Severe  Onset:  Senior year of HS, worsened in the past year    Modifiers: eliminated soda and switched to water  Contributing factors: Poor Food Choices due to being at home during pandemic  Associated symptoms: inability to do certain activities    Goals: 160  Hydration: 1 bottle of water  Alcohol: couple times per month  Exercise: no   Occupation:    [employer]: 4/8    The following portions of the patient's history were reviewed and updated as appropriate: allergies, current medications, past family history, past medical history, past social history, past surgical history and problem list     Review of Systems   Constitutional: Negative for chills and fever  HENT: Negative for sore throat  Respiratory: Negative for cough and shortness of breath  Cardiovascular: Negative for chest pain and palpitations  Gastrointestinal: Negative for constipation and diarrhea  Genitourinary: Negative for dysuria  Musculoskeletal: Negative for arthralgias  Skin: Negative for rash  Neurological: Negative for headaches  Psychiatric/Behavioral: Negative for suicidal ideas (Denies HI)  The patient is nervous/anxious (not currently treated; recommended she discuss with her PCP)  Objective:    /78   Pulse 99   Temp 98 6 °F (37 °C)   Resp 20   Ht 5' 7 5" (1 715 m)   Wt 133 kg (293 lb 3 2 oz)   BMI 45 24 kg/m²     Physical Exam  Vitals signs and nursing note reviewed  Constitutional   General appearance: Abnormal   well developed and morbidly obese  Eyes No conjunctival pallor  Pulmonary   Respiratory effort: No increased work of breathing or signs of respiratory distress  Auscultation of lungs: Clear to auscultation, equal breath sounds bilaterally, no wheezes, no rales, no rhonci  Cardiovascular   Auscultation of heart: Normal rate and rhythm, normal S1 and S2, without murmurs  Examination of extremities for edema and/or varicosities: Normal   no edema  Abdomen   Abdomen: Abnormal   The abdomen was obese  Bowel sounds were normal  The abdomen was soft and nontender     Musculoskeletal   Gait and station: Normal     Skin  Visualized portions without abnormalities  Psychiatric   Orientation to person, place and time: Normal     Affect: appropriate

## 2021-05-06 NOTE — LETTER
May 6, 2021     Patient: Ramírez Bradley   YOB: 2000   Date of Visit: 5/6/2021       To Whom it May Concern:    Ramírez Bradley is under my professional care  She was seen in my office on 5/6/2021  If you have any questions or concerns, please don't hesitate to call           Sincerely,          Jasvir Minor PA-C

## 2021-05-06 NOTE — PATIENT INSTRUCTIONS
Call your insurance and tell them your BMI (on print out) and that your interested in gastric bypass or sleeve gastrectomy and check coverage

## 2021-05-07 ENCOUNTER — TELEPHONE (OUTPATIENT)
Dept: BARIATRICS | Facility: CLINIC | Age: 21
End: 2021-05-07

## 2021-05-07 NOTE — ED PROVIDER NOTES
History  Chief Complaint   Patient presents with   [address]     a few days, RIGHT butt cheek, rash now, heavy scratching  This is a 53-year-old female complains of pain in pruritus over her right gluteal region  She states that it has worsened over the past 2-3 days  States she has never had anything like this before  Notes that she had an abrasion to the area at the beginning of the week  States pain is mild  She has been excoriating at due to the pruritus  States she is otherwise healthy  No nausea, vomiting, fevers or chills  Prior to Admission Medications   Prescriptions Last Dose Informant Patient Reported? Taking? amphetamine-dextroamphetamine (ADDERALL XR) 15 MG 24 hr capsule 5/6/2021 at Unknown time  No Yes   Sig: Take 1 capsule (15 mg total) by mouth every morningMax Daily Amount: 15 mg   levothyroxine 25 mcg tablet 5/6/2021 at Unknown time  No Yes   Sig: Take 1 tablet (25 mcg total) by mouth daily   norethindrone-ethinyl estradiol-iron (MICROGESTIN FE1 5/30) 1 5-30 MG-MCG tablet 5/6/2021 at Unknown time  Yes Yes   Sig: Take 1 tablet by mouth daily      Facility-Administered Medications: None       Past Medical History:   Diagnosis Date    ADHD (attention deficit hyperactivity disorder)        Past Surgical History:   Procedure Laterality Date    FRACTURE SURGERY      WRIST SURGERY Right        Family History   Problem Relation Age of Onset    Heart disease Paternal Grandfather     Diabetes Paternal Grandfather     Lung cancer Paternal Grandfather     Diabetes Sister         prediabetes    Thyroid disease Paternal Grandmother     Stroke Neg Hx      I have reviewed and agree with the history as documented      E-Cigarette/Vaping    E-Cigarette Use Never User      E-Cigarette/Vaping Substances     Social History     Tobacco Use    Smoking status: Never Smoker    Smokeless tobacco: Current User   Substance Use Topics    Alcohol use: Yes     Comment: socially    Drug use: Never       Review of Systems   Constitutional: Negative for activity change, chills, fatigue and fever  HENT: Negative for congestion  Eyes: Negative for visual disturbance  Respiratory: Negative for cough, chest tightness and shortness of breath  Cardiovascular: Negative for chest pain  Gastrointestinal: Negative for abdominal pain, diarrhea and vomiting  Genitourinary: Negative for dysuria  Neurological: Negative for dizziness, weakness and numbness  Physical Exam  Physical Exam  Constitutional:       General: She is not in acute distress  Appearance: She is well-developed  She is not ill-appearing, toxic-appearing or diaphoretic  HENT:      Head: Normocephalic and atraumatic  Eyes:      Conjunctiva/sclera: Conjunctivae normal       Pupils: Pupils are equal, round, and reactive to light  Neck:      Musculoskeletal: Normal range of motion and neck supple  Cardiovascular:      Rate and Rhythm: Normal rate and regular rhythm  Heart sounds: Normal heart sounds  Pulmonary:      Effort: Pulmonary effort is normal  No respiratory distress  Breath sounds: Normal breath sounds  Abdominal:      General: Bowel sounds are normal       Palpations: Abdomen is soft  Musculoskeletal: Normal range of motion  Skin:     General: Skin is warm  Capillary Refill: Capillary refill takes less than 2 seconds  Comments: 8 cm area of cellulitis over the right gluteal region  Minimal induration, no fluctuance   Neurological:      Mental Status: She is alert and oriented to person, place, and time     Psychiatric:         Behavior: Behavior normal          Vital Signs  ED Triage Vitals [05/06/21 2221]   Temperature Pulse Respirations Blood Pressure SpO2   98 6 °F (37 °C) 105 20 (!) 139/102 100 %      Temp src Heart Rate Source Patient Position - Orthostatic VS BP Location FiO2 (%)   -- -- Sitting Left arm --      Pain Score       --           Vitals:    05/06/21 2221   BP: (!) 139/102   Pulse: 105   Patient Position - Orthostatic VS: Sitting         Visual Acuity      ED Medications  Medications   cephalexin (KEFLEX) capsule 500 mg (500 mg Oral Given 5/6/21 2234)       Diagnostic Studies  Results Reviewed     None                 No orders to display              Procedures  Procedures         ED Course                                           MDM  Number of Diagnoses or Management Options  Cellulitis of buttock: new and requires workup  Diagnosis management comments: This is a 58-year-old female with cellulitis of the right buttock  Patient started on Keflex and given a prescription to go home with  Tolerating p o  without difficulty  Patient appears clinically well  Discussed warning signs and symptoms with the patient as well as when to return to the emergency department versus follow up with PC P  Patient states understanding and agreement with the plan  This note was completed using dictation software  Disposition  Final diagnoses:   Cellulitis of buttock     Time reflects when diagnosis was documented in both MDM as applicable and the Disposition within this note     Time User Action Codes Description Comment    5/6/2021 10:30 PM Trice Luevano Add [L03 [id number]] Cellulitis of buttock       ED Disposition     ED Disposition Condition Date/Time Comment    Discharge Stable Thu May 6, 2021 10:30 PM Bryan Del Angel discharge to home/self care              Follow-up Information     Follow up With Specialties Details Why Contact Info    Irais Smith PA-C Family Medicine, Physician Assistant In 1 day  [place] [id number]  [phone]            Discharge Medication List as of 5/6/2021 10:31 PM      START taking these medications    Details   cephalexin (KEFLEX) 500 mg capsule Take 1 capsule (500 mg total) by mouth every 6 (six) hours for 7 days, Starting Thu 5/6/2021, Until Thu 5/13/2021, Normal         CONTINUE these medications which have NOT CHANGED    Details   amphetamine-dextroamphetamine (ADDERALL XR) 15 MG 24 hr capsule Take 1 capsule (15 mg total) by mouth every morningMax Daily Amount: 15 mg, Starting Tue 3/23/2021, Normal      levothyroxine 25 mcg tablet Take 1 tablet (25 mcg total) by mouth daily, Starting Mon 3/15/2021, Normal      norethindrone-ethinyl estradiol-iron (MICROGESTIN FE1 5/30) 1 5-30 MG-MCG tablet Take 1 tablet by mouth daily, Starting Tue 6/23/2020, Until Wed 6/23/2021, Historical Med           No discharge procedures on file      PDMP Review       Value Time User    PDMP Reviewed  Yes 3/23/2021  2:49 PM Tate Mora PA-C          ED Provider  Electronically Signed by           Nunu Hooker MD  05/06/21 4614

## 2021-05-10 ENCOUNTER — HOSPITAL ENCOUNTER (EMERGENCY)
Facility: HOSPITAL | Age: 21
Discharge: HOME | End: 2021-05-10
Attending: EMERGENCY MEDICINE | Admitting: EMERGENCY MEDICINE
Payer: COMMERCIAL

## 2021-05-10 VITALS
WEIGHT: 293 LBS | DIASTOLIC BLOOD PRESSURE: 117 MMHG | OXYGEN SATURATION: 98 % | HEART RATE: 99 BPM | TEMPERATURE: 98.6 F | HEIGHT: 67 IN | BODY MASS INDEX: 45.99 KG/M2 | SYSTOLIC BLOOD PRESSURE: 178 MMHG

## 2021-05-10 DIAGNOSIS — L03.317 CELLULITIS OF BUTTOCK: Primary | ICD-10-CM

## 2021-05-10 PROCEDURE — 99282 EMERGENCY DEPT VISIT SF MDM: CPT

## 2021-05-10 PROCEDURE — 99284 EMERGENCY DEPT VISIT MOD MDM: CPT | Performed by: EMERGENCY MEDICINE

## 2021-05-10 RX ORDER — HYDROCORTISONE 25 MG/ML
LOTION TOPICAL 2 TIMES DAILY
Qty: 50 ML | Refills: 0 | Status: SHIPPED | OUTPATIENT
Start: 2021-05-10 | End: 2021-05-12

## 2021-05-10 RX ORDER — SULFAMETHOXAZOLE AND TRIMETHOPRIM 800; 160 MG/1; MG/1
1 TABLET ORAL 2 TIMES DAILY
Qty: 14 TABLET | Refills: 0 | Status: SHIPPED | OUTPATIENT
Start: 2021-05-10 | End: 2021-05-17

## 2021-05-10 NOTE — ED PROVIDER NOTES
History  Chief Complaint   Patient presents with    Follow-Up Cellulitis     seen here three nights ago for dx cellulitis-worse on antibiotics  right buttock, now going to left side     This is a 79-year-old female who previously and we wounds using erythema around the right gluteal cellulitis  Patient was seen approximately 3 days ago for the initial cellulitis and started on Keflex  Since that time since the wound was weeping she was using gauze and surgical tape to prevent the destroying her clothes  States that area is now pruritic  She states it is somewhat painful  Denies any fevers, chills, nausea, vomiting or malaise  States she is not immune compromised  Prior to Admission Medications   Prescriptions Last Dose Informant Patient Reported? Taking? amphetamine-dextroamphetamine (ADDERALL XR) 15 MG 24 hr capsule   No No   Sig: Take 1 capsule (15 mg total) by mouth every morningMax Daily Amount: 15 mg   cephalexin (KEFLEX) 500 mg capsule   No No   Sig: Take 1 capsule (500 mg total) by mouth every 6 (six) hours for 7 days   levothyroxine 25 mcg tablet   No No   Sig: Take 1 tablet (25 mcg total) by mouth daily   norethindrone-ethinyl estradiol-iron (MICROGESTIN FE1 5/30) 1 5-30 MG-MCG tablet   Yes No   Sig: Take 1 tablet by mouth daily      Facility-Administered Medications: None       Past Medical History:   Diagnosis Date    ADHD (attention deficit hyperactivity disorder)        Past Surgical History:   Procedure Laterality Date    FRACTURE SURGERY      WRIST SURGERY Right        Family History   Problem Relation Age of Onset    Heart disease Paternal Grandfather     Diabetes Paternal Grandfather     Lung cancer Paternal Grandfather     Diabetes Sister         prediabetes    Thyroid disease Paternal Grandmother     Stroke Neg Hx      I have reviewed and agree with the history as documented      E-Cigarette/Vaping    E-Cigarette Use Never User      E-Cigarette/Vaping Substances Social History     Tobacco Use    Smoking status: Never Smoker    Smokeless tobacco: Current User   Substance Use Topics    Alcohol use: Yes     Comment: socially    Drug use: Never       Review of Systems   Constitutional: Negative for activity change, chills, fatigue and fever  HENT: Negative for congestion  Eyes: Negative for visual disturbance  Respiratory: Negative for cough, chest tightness and shortness of breath  Cardiovascular: Negative for chest pain  Gastrointestinal: Negative for abdominal pain, diarrhea and vomiting  Genitourinary: Negative for dysuria  Skin: Negative for rash  Neurological: Negative for dizziness, weakness and numbness  Physical Exam  Physical Exam  Constitutional:       Appearance: She is well-developed  HENT:      Head: Normocephalic and atraumatic  Eyes:      Conjunctiva/sclera: Conjunctivae normal       Pupils: Pupils are equal, round, and reactive to light  Neck:      Musculoskeletal: Normal range of motion and neck supple  Cardiovascular:      Rate and Rhythm: Normal rate and regular rhythm  Heart sounds: Normal heart sounds  Pulmonary:      Effort: Pulmonary effort is normal  No respiratory distress  Breath sounds: Normal breath sounds  Abdominal:      General: Bowel sounds are normal       Palpations: Abdomen is soft  Musculoskeletal: Normal range of motion  Skin:     General: Skin is warm  Capillary Refill: Capillary refill takes less than 2 seconds  Comments: Chaperone present  Cellulitis similar to previous size on the right gluteal region  Large surrounding area of contact dermatitis consistent with reaction to tape which was a applied to hold the bandages in place  Neurological:      Mental Status: She is alert and oriented to person, place, and time     Psychiatric:         Behavior: Behavior normal          Vital Signs  ED Triage Vitals [05/10/21 0314]   Temperature Pulse Resp Blood Pressure SpO2 98 6 °F (37 °C) 99 -- (!) 178/117 98 %      Temp src Heart Rate Source Patient Position - Orthostatic VS BP Location FiO2 (%)   -- -- -- -- --      Pain Score       --           Vitals:    05/10/21 0314   BP: (!) 178/117   Pulse: 99         Visual Acuity      ED Medications  Medications - No data to display    Diagnostic Studies  Results Reviewed     None                 No orders to display              Procedures  Procedures         ED Course                                           MDM  Number of Diagnoses or Management Options  Cellulitis of buttock: new and requires workup  Diagnosis management comments: This is a 71-year-old female cellulitis of the buttock  Bedside ultrasound which showed so of this wound complicated by contact dermatitis surrounding the lesion  Appears stable if not improved from last visit  Added Bactrim for double coverage due to lack of resolution  Patient appeared clinically well  Discussed warning signs and symptoms with the patient as well as when to return to the emergency department versus follow up with [facility]  Patient states understanding and agreement with the plan  This note was completed using dictation software  Amount and/or Complexity of Data Reviewed  Independent visualization of images, tracings, or specimens: yes        Disposition  Final diagnoses:   Cellulitis of buttock     Time reflects when diagnosis was documented in both MDM as applicable and the Disposition within this note     Time User Action Codes Description Comment    5/10/2021  3:46 AM Carey Faustin Add [L03 317] Cellulitis of buttock       ED Disposition     ED Disposition Condition Date/Time Comment    Discharge Stable Mon May 10, 2021  3:46 AM Kathleen Byrne discharge to home/self care              Follow-up Information     Follow up With Specialties Details Why Contact Info    Eric Jorgensen PA-C Family Medicine, Physician Assistant In 1 day  [address] [place]  [facility] [address]  [phone]            Discharge Medication List as of 5/10/2021  3:47 AM      START taking these medications    Details   sulfamethoxazole-trimethoprim (BACTRIM DS) 800-160 mg per tablet Take 1 tablet by mouth 2 (two) times a day for 7 days smx-tmp DS (BACTRIM) 800-160 mg tabs (1tab q12 D10), Starting Mon 5/10/2021, Until Mon 5/17/2021, Normal         CONTINUE these medications which have NOT CHANGED    Details   amphetamine-dextroamphetamine (ADDERALL XR) 15 MG 24 hr capsule Take 1 capsule (15 mg total) by mouth every morningMax Daily Amount: 15 mg, Starting Tue 3/23/2021, Normal      cephalexin (KEFLEX) 500 mg capsule Take 1 capsule (500 mg total) by mouth every 6 (six) hours for 7 days, Starting Thu 5/6/2021, Until Thu 5/13/2021, Normal      levothyroxine 25 mcg tablet Take 1 tablet (25 mcg total) by mouth daily, Starting Mon 3/15/2021, Normal      norethindrone-ethinyl estradiol-iron (MICROGESTIN FE1 5/30) 1 5-30 MG-MCG tablet Take 1 tablet by mouth daily, Starting Tue 6/23/2020, Until Wed 6/23/2021, Historical Med           No discharge procedures on file      PDMP Review       Value Time User    PDMP Reviewed  Yes 3/23/2021  2:49 PM Jj Harden PA-C          ED Provider  Electronically Signed by           Tj Grijalva MD  05/10/21 5596

## 2021-05-10 NOTE — Clinical Note
Adarsh Haywood was seen and treated in our emergency department on 5/10/2021  Diagnosis:     Eden Dominique    She may return on this date: 05/12/2021         If you have any questions or concerns, please don't hesitate to call        Marilee Gracia MD    ______________________________           _______________          _______________  Hospital Representative                              Date                                Time

## 2021-05-12 ENCOUNTER — OFFICE VISIT (OUTPATIENT)
Dept: FAMILY MEDICINE CLINIC | Facility: CLINIC | Age: 21
End: 2021-05-12
Payer: COMMERCIAL

## 2021-05-12 VITALS
WEIGHT: 293 LBS | HEART RATE: 105 BPM | HEIGHT: 67 IN | DIASTOLIC BLOOD PRESSURE: 78 MMHG | BODY MASS INDEX: 45.99 KG/M2 | OXYGEN SATURATION: 98 % | TEMPERATURE: 98.7 F | SYSTOLIC BLOOD PRESSURE: 126 MMHG | RESPIRATION RATE: 17 BRPM

## 2021-05-12 DIAGNOSIS — L03.317 CELLULITIS OF BUTTOCK, RIGHT: ICD-10-CM

## 2021-05-12 DIAGNOSIS — F90.0 ATTENTION DEFICIT HYPERACTIVITY DISORDER (ADHD), PREDOMINANTLY INATTENTIVE TYPE: Primary | ICD-10-CM

## 2021-05-12 PROCEDURE — 99213 OFFICE O/P EST LOW 20 MIN: CPT | Performed by: PHYSICIAN ASSISTANT

## 2021-05-12 RX ORDER — DEXTROAMPHETAMINE SACCHARATE, AMPHETAMINE ASPARTATE MONOHYDRATE, DEXTROAMPHETAMINE SULFATE AND AMPHETAMINE SULFATE 6.25; 6.25; 6.25; 6.25 MG/1; MG/1; MG/1; MG/1
25 CAPSULE, EXTENDED RELEASE ORAL EVERY MORNING
Qty: 30 CAPSULE | Refills: 0 | Status: SHIPPED | OUTPATIENT
Start: 2021-05-12 | End: 2021-06-16 | Stop reason: SDUPTHER

## 2021-05-12 NOTE — PROGRESS NOTES
Routine Follow-up    Opal Cox 24 y o  female   Date:  5/12/2021      Assessment and Plan:    Jasper Lau was seen today for follow-up  Diagnoses and all orders for this visit:    Attention deficit hyperactivity disorder (ADHD), predominantly inattentive type  -     amphetamine-dextroamphetamine (ADDERALL XR) 25 MG 24 hr capsule; Take 1 capsule (25 mg total) by mouth every morningMax Daily Amount: 25 mg  - will increased dose and continue to monitor, patient used to be on 30 mg daily   - follow up in 1 month to continue to monitor closely     Cellulitis of buttock, right  - complete dual antibiotics  - seems to be improving compared to picture she has on phone   - would advise to remain off work the rest of this week to continue to avoid prolonged sitting to help continued healing          HPI:  Chief Complaint   Patient presents with    Follow-up     Was seen at ER on May 6 and May 10 for celluitis of the buttock     HPI   Patient is a 23 yo female who presents for 1 month follow up  She is tolerating the adderall 15 mg without concern however she feels her concentration is still impaired  She recalls that she used to be on 30 mg with the best improvement  She also was at the ER twice on the 5/6 and 5/10 due to cellulitis of R buttock, now on cephalexin and bactrim  It is getting better but there is still a lot of redness and it is sore to sit  The pus has stopped draining  She did have itchiness to the gauze and tape she was using, so stopped that and is wearing cotton underwear and was given hydrocortisone lotion which is now not using  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  She has not been taking the adderal the past week as she has been home from work  ROS: Review of Systems   Constitutional: Negative for appetite change, diaphoresis and fever  Respiratory: Negative  Cardiovascular: Negative  Skin: Positive for color change (cellulitis of R buttock)     Neurological: Negative  Psychiatric/Behavioral: Positive for decreased concentration  Negative for suicidal ideas  The patient is hyperactive          Past Medical History:   Diagnosis Date    ADHD (attention deficit hyperactivity disorder)      Patient Active Problem List   Diagnosis    General counseling and advice for contraceptive management    ADHD (attention deficit hyperactivity disorder)    Morbid obesity with BMI of 45 0-49 9, adult ([facility] [address] )    Acquired hypothyroidism       Past Surgical History:   Procedure Laterality Date    FRACTURE SURGERY      WRIST SURGERY Right        Social History     Socioeconomic History    Marital status: Single     Spouse name: None    Number of children: None    Years of education: None    Highest education level: None   Occupational History    None   Social Needs    Financial resource strain: None    Food insecurity     Worry: None     Inability: None    Transportation needs     Medical: No     Non-medical: No   Tobacco Use    Smoking status: Never Smoker    Smokeless tobacco: Never Used   Substance and Sexual Activity    Alcohol use: Yes     Comment: socially    Drug use: Never    Sexual activity: Yes     Partners: Male     Birth control/protection: OCP   Lifestyle    Physical activity     Days per week: None     Minutes per session: None    Stress: None   Relationships    Social connections     Talks on phone: None     Gets together: None     Attends [employer] service: None     Active member of club or organization: None     Attends meetings of clubs or organizations: None     Relationship status: None    Intimate partner violence     Fear of current or ex partner: None     Emotionally abused: None     Physically abused: None     Forced sexual activity: None   Other Topics Concern    None   Social History Narrative    None       Family History   Problem Relation Age of Onset    Heart disease Paternal Grandfather     Diabetes Paternal Grandfather     Lung cancer Paternal Grandfather     Diabetes Sister         prediabetes    Thyroid disease Paternal Grandmother     Stroke Neg Hx        No Known Allergies      Current Outpatient Medications:     amphetamine-dextroamphetamine (ADDERALL XR) 25 MG 24 hr capsule, Take 1 capsule (25 mg total) by mouth every morningMax Daily Amount: 25 mg, Disp: 30 capsule, Rfl: 0    cephalexin (KEFLEX) 500 mg capsule, Take 1 capsule (500 mg total) by mouth every 6 (six) hours for 7 days, Disp: 28 capsule, Rfl: 0    levothyroxine 25 mcg tablet, Take 1 tablet (25 mcg total) by mouth daily, Disp: 90 tablet, Rfl: 0    norethindrone-ethinyl estradiol-iron (MICROGESTIN FE1 5/30) 1 5-30 MG-MCG tablet, Take 1 tablet by mouth daily, Disp: , Rfl:     sulfamethoxazole-trimethoprim (BACTRIM DS) 800-160 mg per tablet, Take 1 tablet by mouth 2 (two) times a day for 7 days smx-tmp DS (BACTRIM) 800-160 mg tabs (1tab q12 D10), Disp: 14 tablet, Rfl: 0      Physical Exam:  /78 (BP Location: Left arm, Patient Position: Sitting)   Pulse 105   Temp 98 7 °F (37 1 °C)   Resp 17   Ht 5' 7" (1 702 m)   Wt 134 kg (296 lb)   SpO2 98%   BMI 46 36 kg/m²     Physical Exam  Constitutional:       General: She is not in acute distress  Appearance: Normal appearance  She is obese  Cardiovascular:      Rate and Rhythm: Normal rate and regular rhythm  Pulmonary:      Effort: Pulmonary effort is normal  No respiratory distress  Skin:         Neurological:      General: No focal deficit present  Mental Status: She is alert and oriented to person, place, and time  Cranial Nerves: No cranial nerve deficit  Psychiatric:         Mood and Affect: Mood normal          Behavior: Behavior normal          Thought Content:  Thought content normal          Judgment: Judgment normal            Labs:  Lab Results   Component Value Date    WBC 7 29 11/23/2020    HGB 13 8 11/23/2020    HCT 42 4 11/23/2020    MCV 92 11/23/2020     11/23/2020 Lab Results   Component Value Date    K 4 1 11/23/2020     11/23/2020    CO2 24 11/23/2020    BUN 13 11/23/2020    CREATININE 0 79 11/23/2020    GLUF 76 11/23/2020    CALCIUM 9 1 11/23/2020    AST 10 11/23/2020    ALT 19 11/23/2020    ALKPHOS 44 (L) 11/23/2020    EGFR 107 11/23/2020

## 2021-05-12 NOTE — LETTER
May 12, 2021     Patient: Jay Anne   YOB: 2000   Date of Visit: 5/12/2021       To Whom it May Concern:    Jay Anne is under my professional care  She was seen in my office on 5/12/2021  She may be excused through the rest of this week until Friday May 14th  She may return to next scheduled day to work on Tuesday May 18th  She may be excused from absences on Friday May 7th until today as well following ER visits on 5/6 and 5/10  If you have any questions or concerns, please don't hesitate to call           Sincerely,          Deya Núñez PA-C

## 2021-05-18 ENCOUNTER — TELEPHONE (OUTPATIENT)
Dept: FAMILY MEDICINE CLINIC | Facility: CLINIC | Age: 21
End: 2021-05-18

## 2021-05-18 NOTE — TELEPHONE ENCOUNTER
Called pharmacy and was informed that prescription was received but it does require a prior authorization  Called a patient to inform that authorization is needed and office will start it  Authorization started on covermymeds   Key code is [facility]

## 2021-05-18 NOTE — TELEPHONE ENCOUNTER
Received fax from pharmacy stating primary insurance requires prior authorization  Authorization started on covermymeds  Key code is [facility]  Authorization is approved

## 2021-05-28 ENCOUNTER — CONSULT (OUTPATIENT)
Dept: BARIATRICS | Facility: CLINIC | Age: 21
End: 2021-05-28
Payer: COMMERCIAL

## 2021-05-28 VITALS
TEMPERATURE: 97.6 F | WEIGHT: 293 LBS | DIASTOLIC BLOOD PRESSURE: 90 MMHG | HEART RATE: 89 BPM | RESPIRATION RATE: 12 BRPM | BODY MASS INDEX: 44.41 KG/M2 | HEIGHT: 68 IN | SYSTOLIC BLOOD PRESSURE: 140 MMHG

## 2021-05-28 DIAGNOSIS — E03.9 ACQUIRED HYPOTHYROIDISM: ICD-10-CM

## 2021-05-28 DIAGNOSIS — F90.8 ATTENTION DEFICIT HYPERACTIVITY DISORDER (ADHD), OTHER TYPE: ICD-10-CM

## 2021-05-28 DIAGNOSIS — E66.01 MORBID OBESITY WITH BMI OF 45.0-49.9, ADULT (HCC): Primary | ICD-10-CM

## 2021-05-28 PROCEDURE — 99203 OFFICE O/P NEW LOW 30 MIN: CPT | Performed by: SURGERY

## 2021-05-28 PROCEDURE — 1036F TOBACCO NON-USER: CPT | Performed by: SURGERY

## 2021-05-28 NOTE — LETTER
May 28, 2021     Brian JOHANNA Wise  [address]  [place] [facility]  [phone]    Patient: Melissa Liao   YOB: 2000   Date of Visit: 5/28/2021       Dear Shilpi Ambrose: Thank you for referring Melissa Liao to me for evaluation for bariatric surgery  Below are my notes for this consultation  If you have questions, please do not hesitate to call me on my cell phone at [phone]  I look forward to following your patient along with you  Sincerely,    Anjana Rodriguez MD, Diamond Armstrong, [facility]  Metabolic & Bariatric Surgery Director  [facility] - [facility]j   5/28/2021  3:18 PM          CC: No Recipients  Monica Mistry MD  5/28/2021  3:17 PM  Sign when Signing Visit      [address] [facility] - [address] [place] [address],  [address] 24 y o  female MRN: [id number]  Unit/Bed#:  Encounter: [id number]      HPI:  Melissa Liao is a very pleasant 24 y o  female who presents with a longstanding history of morbid obesity and inability to sustain a meaningful weight loss  Here today to discuss bariatric options  She is a  for a dental office in [facility] Noe mass index is 45 44 kg/m²  ++Suffers from ADHD, hypothyroidism, GERD, BRUCE suspected  **OCPs - instructed on 1 month before/after surgery to hold    Visit type: consultation     Symptoms: excess weight, weight increase, inability to loss weight and fatigue    Associated Symptoms: none    Associated Conditions: abdominal obesity  Disease Complications: none  Weight Loss Interest: high  Previous Diet Trials: low calorie     Exercise Frequency:infrequency  Types of Exercise: walking      Review of Systems   Constitutional: Negative  Respiratory: Negative  Cardiovascular: Negative  Gastrointestinal: Negative  Musculoskeletal: Negative  Neurological: Negative  All other systems reviewed and are negative        Historical Information   Past Medical History:   Diagnosis Date    ADHD (attention deficit hyperactivity disorder)     Morbid obesity with BMI of 45 0-49 9, adult ([facility])      Past Surgical History:   Procedure Laterality Date    FRACTURE SURGERY      WRIST SURGERY Right      Social History   Social History     Substance and Sexual Activity   Alcohol Use Yes    Comment: socially     Social History     Substance and Sexual Activity   Drug Use Never     Social History     Tobacco Use   Smoking Status Never Smoker   Smokeless Tobacco Never Used     Family History:   Family History   Problem Relation Age of Onset    Heart disease Paternal Grandfather     Diabetes Paternal Grandfather     Lung cancer Paternal Grandfather     Diabetes Sister         prediabetes    Thyroid disease Paternal Grandmother     Stroke Neg Hx        Meds/Allergies   all medications and allergies reviewed  No Known Allergies    Objective       Current Vitals:   /90   Pulse 89   Temp 97 6 °F (36 4 °C)   Resp 12   Ht 5' 7 5" (1 715 m)   Wt 134 kg (294 lb 8 oz)   BMI 45 44 kg/m²       Physical Exam  Vitals signs reviewed  Constitutional:       Appearance: She is well-developed  HENT:      Head: Normocephalic  Eyes:      Extraocular Movements: Extraocular movements intact  Neck:      Musculoskeletal: Normal range of motion  Cardiovascular:      Rate and Rhythm: Normal rate  Pulmonary:      Effort: Pulmonary effort is normal    Abdominal:      General: There is no distension  Musculoskeletal: Normal range of motion  Neurological:      Mental Status: She is alert and oriented to person, place, and time  Psychiatric:         Mood and Affect: Mood normal          Behavior: Behavior normal          Thought Content: Thought content normal          Judgment: Judgment normal          Lab Results: I have personally reviewed pertinent lab results  Imaging: I have personally reviewed pertinent reports  EKG, Pathology, and Other Studies: I have personally reviewed pertinent reports  Assessment/PLAN:    24 y o  yo female with a long standing h/o of obesity and inability to sustain any meaningful weight loss on her own despite several attempts  She is interested in the Laparoscopic kayy-en-y gastric bypass  ++Suffers from ADHD, hypothyroidism, GERD, BRUCE suspected  **OCPs - instructed on 1 month before/after surgery to hold    I have explained our Enhanced Recovery After Bariatric Surgery (ERABS) protocol and benefits including preoperative, intraoperative and postoperative elements  As a part of his pre op process, she will undergo evaluation appointments with out dietician, licensed care , and   After the evaluation, she may be referred to a cardiologist and for a sleep evaluation and consult  She needs an EGD to evaluate the anatomy of her GI tract prior to the operation  I have spent over 45 minutes with her face to face in the office today discussing her options and details of the surgery  We have seen an animation of the surgery on the computer that illustrates how the operation is done and how the anatomy will be altered with the procedure  Over 50% of this was coordinating care  She was given the opportunity to ask questions and I have answered all of them  I have discussed and educated the patient with regards to the components of our multidisciplinary program and the importance of compliance and follow up in the post operative period  The patient was also instructed with regards to the importance of behavior modification, nutritional counseling, support meeting attendance and lifestyle changes that are important to ensure success  Although there is a great statistical chance of improvement or even resolution of most of her associated comorbidities, the results vary from patient to patient and they largely depend on her commitment and compliance         Fredis Mcgrath MD, FACS, [facility]  5/28/2021  2:58 PM

## 2021-05-28 NOTE — PROGRESS NOTES
BARIATRIC INITIAL CONSULT - BARIATRIC SURGERY    Marco Manriquez 24 y o  female MRN: [id number]  Unit/Bed#:  Encounter: [id number]      HPI:  Marco Manriquez is a very pleasant 24 y o  female who presents with a longstanding history of morbid obesity and inability to sustain a meaningful weight loss  Here today to discuss bariatric options  She is a  for a dental office in [facility] Noe mass index is 45 44 kg/m²  ++Suffers from ADHD, hypothyroidism, GERD, BRUCE suspected  **OCPs - instructed on 1 month before/after surgery to hold    Visit type: consultation     Symptoms: excess weight, weight increase, inability to loss weight and fatigue    Associated Symptoms: none    Associated Conditions: abdominal obesity  Disease Complications: none  Weight Loss Interest: high  Previous Diet Trials: low calorie     Exercise Frequency:infrequency  Types of Exercise: walking      Review of Systems   Constitutional: Negative  Respiratory: Negative  Cardiovascular: Negative  Gastrointestinal: Negative  Musculoskeletal: Negative  Neurological: Negative  All other systems reviewed and are negative        Historical Information   Past Medical History:   Diagnosis Date    ADHD (attention deficit hyperactivity disorder)     Morbid obesity with BMI of 45 0-49 9, adult ([facility] [address] )      Past Surgical History:   Procedure Laterality Date    FRACTURE SURGERY      WRIST SURGERY Right      Social History   Social History     Substance and Sexual Activity   Alcohol Use Yes    Comment: socially     Social History     Substance and Sexual Activity   Drug Use Never     Social History     Tobacco Use   Smoking Status Never Smoker   Smokeless Tobacco Never Used     Family History:   Family History   Problem Relation Age of Onset    Heart disease Paternal Grandfather     Diabetes Paternal Grandfather     Lung cancer Paternal Grandfather     Diabetes Sister         prediabetes    Thyroid disease Paternal Grandmother     Stroke Neg Hx        Meds/Allergies   all medications and allergies reviewed  No Known Allergies    Objective       Current Vitals:   /90   Pulse 89   Temp 97 6 °F (36 4 °C)   Resp 12   Ht 5' 7 5" (1 715 m)   Wt 134 kg (294 lb 8 oz)   BMI 45 44 kg/m²       Physical Exam  Vitals signs reviewed  Constitutional:       Appearance: She is well-developed  HENT:      Head: Normocephalic  Eyes:      Extraocular Movements: Extraocular movements intact  Neck:      Musculoskeletal: Normal range of motion  Cardiovascular:      Rate and Rhythm: Normal rate  Pulmonary:      Effort: Pulmonary effort is normal    Abdominal:      General: There is no distension  Musculoskeletal: Normal range of motion  Neurological:      Mental Status: She is alert and oriented to person, place, and time  Psychiatric:         Mood and Affect: Mood normal          Behavior: Behavior normal          Thought Content: Thought content normal          Judgment: Judgment normal          Lab Results: I have personally reviewed pertinent lab results  Imaging: I have personally reviewed pertinent reports  EKG, Pathology, and Other Studies: I have personally reviewed pertinent reports  Assessment/PLAN:    24 y o  yo female with a long standing h/o of obesity and inability to sustain any meaningful weight loss on her own despite several attempts  She is interested in the Laparoscopic kayy-en-y gastric bypass  ++Suffers from ADHD, hypothyroidism, GERD, BRUCE suspected  **OCPs - instructed on 1 month before/after surgery to hold    I have explained our Enhanced Recovery After Bariatric Surgery (ERABS) protocol and benefits including preoperative, intraoperative and postoperative elements  As a part of his pre op process, she will undergo evaluation appointments with out dietician, licensed care , and    After the evaluation, she may be referred to a cardiologist and for a sleep evaluation and consult  She needs an EGD to evaluate the anatomy of her GI tract prior to the operation  I have spent over 45 minutes with her face to face in the office today discussing her options and details of the surgery  We have seen an animation of the surgery on the computer that illustrates how the operation is done and how the anatomy will be altered with the procedure  Over 50% of this was coordinating care  She was given the opportunity to ask questions and I have answered all of them  I have discussed and educated the patient with regards to the components of our multidisciplinary program and the importance of compliance and follow up in the post operative period  The patient was also instructed with regards to the importance of behavior modification, nutritional counseling, support meeting attendance and lifestyle changes that are important to ensure success  Although there is a great statistical chance of improvement or even resolution of most of her associated comorbidities, the results vary from patient to patient and they largely depend on her commitment and compliance         Trina Smalls MD, FACS, [facility]  5/28/2021  2:58 PM

## 2021-06-02 DIAGNOSIS — Z78.9 USES BIRTH CONTROL: Primary | ICD-10-CM

## 2021-06-02 RX ORDER — NORETHINDRONE ACETATE AND ETHINYL ESTRADIOL 1.5-30(21)
1 KIT ORAL DAILY
Qty: 28 TABLET | Refills: 0 | Status: SHIPPED | OUTPATIENT
Start: 2021-06-02 | End: 2021-06-18 | Stop reason: ALTCHOICE

## 2021-06-16 DIAGNOSIS — F90.0 ATTENTION DEFICIT HYPERACTIVITY DISORDER (ADHD), PREDOMINANTLY INATTENTIVE TYPE: ICD-10-CM

## 2021-06-16 RX ORDER — DEXTROAMPHETAMINE SACCHARATE, AMPHETAMINE ASPARTATE MONOHYDRATE, DEXTROAMPHETAMINE SULFATE AND AMPHETAMINE SULFATE 6.25; 6.25; 6.25; 6.25 MG/1; MG/1; MG/1; MG/1
25 CAPSULE, EXTENDED RELEASE ORAL EVERY MORNING
Qty: 30 CAPSULE | Refills: 0 | Status: SHIPPED | OUTPATIENT
Start: 2021-06-16 | End: 2021-08-23 | Stop reason: SDUPTHER

## 2021-06-16 NOTE — TELEPHONE ENCOUNTER
Patient requests [facility] to [facility]  She forgot about her appointment last Friday and rescheduled based on when she could get her lab work completed  Patient took mobile lab phone number to have them come to her for it

## 2021-06-17 ENCOUNTER — APPOINTMENT (OUTPATIENT)
Dept: LAB | Facility: CLINIC | Age: 21
End: 2021-06-17
Payer: COMMERCIAL

## 2021-06-17 DIAGNOSIS — E03.8 SUBCLINICAL HYPOTHYROIDISM: ICD-10-CM

## 2021-06-17 DIAGNOSIS — R63.5 WEIGHT GAIN: ICD-10-CM

## 2021-06-17 LAB
T4 FREE SERPL-MCNC: 0.95 NG/DL (ref 0.76–1.46)
TSH SERPL DL<=0.05 MIU/L-ACNC: 5.7 UIU/ML (ref 0.36–3.74)

## 2021-06-17 PROCEDURE — 84439 ASSAY OF FREE THYROXINE: CPT

## 2021-06-17 PROCEDURE — 36415 COLL VENOUS BLD VENIPUNCTURE: CPT

## 2021-06-17 PROCEDURE — 86376 MICROSOMAL ANTIBODY EACH: CPT

## 2021-06-17 PROCEDURE — 86800 THYROGLOBULIN ANTIBODY: CPT

## 2021-06-17 PROCEDURE — 84443 ASSAY THYROID STIM HORMONE: CPT

## 2021-06-18 ENCOUNTER — OFFICE VISIT (OUTPATIENT)
Dept: OBGYN CLINIC | Facility: CLINIC | Age: 21
End: 2021-06-18
Payer: COMMERCIAL

## 2021-06-18 VITALS
HEIGHT: 68 IN | WEIGHT: 293 LBS | DIASTOLIC BLOOD PRESSURE: 76 MMHG | BODY MASS INDEX: 44.41 KG/M2 | SYSTOLIC BLOOD PRESSURE: 142 MMHG

## 2021-06-18 DIAGNOSIS — Z01.419 ROUTINE GYNECOLOGICAL EXAMINATION: Primary | ICD-10-CM

## 2021-06-18 DIAGNOSIS — Z12.4 SCREENING FOR MALIGNANT NEOPLASM OF CERVIX: ICD-10-CM

## 2021-06-18 DIAGNOSIS — Z30.016 ENCOUNTER FOR INITIAL PRESCRIPTION OF TRANSDERMAL PATCH HORMONAL CONTRACEPTIVE DEVICE: ICD-10-CM

## 2021-06-18 DIAGNOSIS — Z78.9 USES BIRTH CONTROL: ICD-10-CM

## 2021-06-18 LAB
THYROGLOB AB SERPL-ACNC: 91.4 IU/ML (ref 0–0.9)
THYROPEROXIDASE AB SERPL-ACNC: 138 IU/ML (ref 0–34)

## 2021-06-18 PROCEDURE — 3008F BODY MASS INDEX DOCD: CPT | Performed by: SURGERY

## 2021-06-18 PROCEDURE — G0145 SCR C/V CYTO,THINLAYER,RESCR: HCPCS | Performed by: PATHOLOGY

## 2021-06-18 PROCEDURE — 87624 HPV HI-RISK TYP POOLED RSLT: CPT | Performed by: ADVANCED PRACTICE MIDWIFE

## 2021-06-18 PROCEDURE — G0124 SCREEN C/V THIN LAYER BY MD: HCPCS | Performed by: PATHOLOGY

## 2021-06-18 PROCEDURE — S0610 ANNUAL GYNECOLOGICAL EXAMINA: HCPCS | Performed by: ADVANCED PRACTICE MIDWIFE

## 2021-06-18 NOTE — PROGRESS NOTES
[facility] [address] [place] [address]    ASSESSMENT/PLAN: Jay Anne is a 24 y o  Ajaustin Nichols who presents for annual gynecologic exam     Encounter for routine gynecologic examination  - Routine well woman exam completed today  - Cervical Cancer Screening: Current ASCCP Guidelines reviewed  Last Pap: 06/18/2021 First pap smear   - HPV Vaccination status: Not fully vacinated  - STI screening offered including HIV testing: declined testing   - Contraceptive counseling discussed  Current contraception: condoms and OCP  Discussed concerns related to weight and use of patch  Risk/benefits, side effects and administration reviewed  Questions asked and answered  Verbalizes understanding  Would like to see weight down 50-70 lbs before changing to the patch   - increase H2O intake  Has appointment with weight management     - GYN exam in 1 year  Additional problems addressed during this visit:  1  Routine gynecological examination  -     Liquid-based pap, screening    2  Screening for malignant neoplasm of cervix  -     Liquid-based pap, screening    3  Encounter for initial prescription of transdermal patch hormonal contraceptive device after patient left contacted patient and suggest that she consider a different option due to weight recommendations related to the patch and efficacy  She is in agreement  Can call for new OCP script when needed  CC:  Annual Gynecologic Examination    HPI: Jay Anne is a 24 y o  Aj Nichols who presents for annual gynecologic examination  Nelly Armas presents for first gyn exam  Cycle is regular and has been on Microgestin, but would like to change to a patch  Hx of taking Depo and weight gain  Has had weight gain and is going to see weight management for options  Will check on efficacy of birth control patch at current weight  Sleeps 7-8 hours a day, minimal calcium rich foods and minimal exercise  Irregular intake of caffeine   Performs occasional SBE  Minimal H2O intake recommend increasing  The following portions of the patient's history were reviewed and updated as appropriate: allergies, current medications, past family history, past medical history, obstetric history, gynecologic history, past social history, past surgical history and problem list     Review of Systems   Constitutional: Negative for activity change, appetite change, fatigue and fever  Respiratory: Negative for cough and shortness of breath  Cardiovascular: Negative for chest pain, palpitations and leg swelling  Gastrointestinal: Negative for constipation and diarrhea  Genitourinary: Negative for difficulty urinating, frequency, menstrual problem, vaginal bleeding, vaginal discharge and vaginal pain  Neurological: Negative for light-headedness and headaches  Psychiatric/Behavioral: The patient is not nervous/anxious  Objective:  /76 (BP Location: Right arm, Patient Position: Sitting, Cuff Size: Large)   Ht 5' 7 5" (1 715 m)   Wt 134 kg (295 lb 12 8 oz)   LMP 06/07/2021 (Approximate)   BMI 45 65 kg/m²    Physical Exam  Vitals reviewed  Constitutional:       Appearance: Normal appearance  HENT:      Head: Normocephalic  Neck:      Thyroid: No thyroid mass or thyroid tenderness  Cardiovascular:      Rate and Rhythm: Normal rate and regular rhythm  Heart sounds: Normal heart sounds  Pulmonary:      Effort: Pulmonary effort is normal       Breath sounds: Normal breath sounds  Chest:      Breasts:         Right: No mass, nipple discharge, skin change or tenderness  Left: No mass, nipple discharge, skin change or tenderness  Abdominal:      General: There is no distension  Palpations: There is no mass  Tenderness: There is no abdominal tenderness  There is no guarding  Genitourinary:     General: Normal vulva  Exam position: Lithotomy position  Labia:         Right: No tenderness or lesion           Left: No tenderness or lesion  Vagina: No vaginal discharge, tenderness, bleeding or lesions  Cervix: No discharge, lesion, erythema or cervical bleeding  Uterus: Normal  Not enlarged and not tender  Adnexa:         Right: No mass, tenderness or fullness  Left: No mass, tenderness or fullness  Musculoskeletal:      Cervical back: Normal range of motion  Lymphadenopathy:      Upper Body:      Right upper body: No axillary adenopathy  Left upper body: No axillary adenopathy  Skin:     General: Skin is warm and dry  Neurological:      Mental Status: She is alert     Psychiatric:         Mood and Affect: Mood normal          Behavior: Behavior normal          Judgment: Judgment normal

## 2021-06-23 RX ORDER — NORETHINDRONE ACETATE AND ETHINYL ESTRADIOL 1.5-30(21)
1 KIT ORAL DAILY
Qty: 28 TABLET | Refills: 11 | Status: SHIPPED | OUTPATIENT
Start: 2021-06-23 | End: 2021-10-08

## 2021-06-24 LAB
LAB AP GYN PRIMARY INTERPRETATION: ABNORMAL
Lab: ABNORMAL
PATH INTERP SPEC-IMP: ABNORMAL

## 2021-06-29 LAB
HPV HR 12 DNA CVX QL NAA+PROBE: NEGATIVE
HPV16 DNA CVX QL NAA+PROBE: NEGATIVE
HPV18 DNA CVX QL NAA+PROBE: NEGATIVE

## 2021-07-01 ENCOUNTER — TELEMEDICINE (OUTPATIENT)
Dept: FAMILY MEDICINE CLINIC | Facility: CLINIC | Age: 21
End: 2021-07-01
Payer: COMMERCIAL

## 2021-07-01 DIAGNOSIS — F90.8 ATTENTION DEFICIT HYPERACTIVITY DISORDER (ADHD), OTHER TYPE: ICD-10-CM

## 2021-07-01 DIAGNOSIS — E03.9 ACQUIRED HYPOTHYROIDISM: Primary | ICD-10-CM

## 2021-07-01 DIAGNOSIS — R79.89 HIGH SERUM THYROID STIMULATING HORMONE (TSH): ICD-10-CM

## 2021-07-01 DIAGNOSIS — R63.5 WEIGHT GAIN: ICD-10-CM

## 2021-07-01 PROCEDURE — 99213 OFFICE O/P EST LOW 20 MIN: CPT | Performed by: PHYSICIAN ASSISTANT

## 2021-07-01 PROCEDURE — 1036F TOBACCO NON-USER: CPT | Performed by: PHYSICIAN ASSISTANT

## 2021-07-01 RX ORDER — LEVOTHYROXINE SODIUM 0.05 MG/1
50 TABLET ORAL DAILY
Qty: 90 TABLET | Refills: 1 | Status: SHIPPED | OUTPATIENT
Start: 2021-07-01 | End: 2021-09-03 | Stop reason: SDUPTHER

## 2021-07-01 NOTE — PROGRESS NOTES
Virtual Regular Visit      Assessment/Plan:    Problem List Items Addressed This Visit        Endocrine    Acquired hypothyroidism - Primary    Relevant Medications    levothyroxine 50 mcg tablet    Other Relevant Orders    TSH, 3rd generation    T4, free       Other    ADHD (attention deficit hyperactivity disorder)  Increase adderal to 30 xl on next refill       Other Visit Diagnoses     Weight gain        continue to monitor thyroid  Continue to follow with weight management/bariatrics    High serum thyroid stimulating hormone (TSH)        increase synthroid to 50 mcg and repeat labs in 2 months     Relevant Orders    TSH, 3rd generation    T4, free               Reason for visit is No chief complaint on file  Encounter provider Riky Villar PA-C    Provider located at [address] [facility]  [address] [facility] [id number]      Recent Visits  No visits were found meeting these conditions  Showing recent visits within past 7 days and meeting all other requirements  Today's Visits  Date Type Provider Dept   07/01/21 Telemedicine Riky Villar PA-C [facility] [address] [facility] today's visits and meeting all other requirements  Future Appointments  No visits were found meeting these conditions  Showing future appointments within next 150 days and meeting all other requirements       The patient was identified by name and date of birth  Jj Bañuelos was informed that this is a telemedicine visit and that the visit is being conducted through [address] [place] [address] and patient was informed that this is a secure, HIPAA-compliant platform  She agrees to proceed     My office door was closed  No one else was in the room  She acknowledged consent and understanding of privacy and security of the video platform  The patient has agreed to participate and understands they can discontinue the visit at any time  Patient is aware this is a billable service  Yana Smith is a 24 y o  female who presents for routine follow up  HPI   Patient is a 23 yo female who presents for 2 month follow up  Since last visit, her thyroid labs came back for positive thyroid antibodies  She is tolerating synthroid 25 mcg but tsh still high  She is tolerating adderral 25 mg xl but still feels difficulty with focus  We will increase to 30 mg which she used to be on in the past  She defers add prn immediate release  She is also following with bariatrics and weight management  Past Medical History:   Diagnosis Date    ADHD (attention deficit hyperactivity disorder)     Hypothyroid     Morbid obesity with BMI of 45 0-49 9, adult ([facility])        Past Surgical History:   Procedure Laterality Date    FRACTURE SURGERY      WRIST SURGERY Right        Current Outpatient Medications   Medication Sig Dispense Refill    amphetamine-dextroamphetamine (ADDERALL XR) 25 MG 24 hr capsule Take 1 capsule (25 mg total) by mouth every morningMax Daily Amount: 25 mg 30 capsule 0    levothyroxine 50 mcg tablet Take 1 tablet (50 mcg total) by mouth daily 90 tablet 1    norethindrone-ethinyl estradiol-iron (MICROGESTIN FE1 5/30) 1 5-30 MG-MCG tablet Take 1 tablet by mouth daily 28 tablet 11     No current facility-administered medications for this visit  No Known Allergies    Review of Systems   Constitutional: Negative for appetite change, diaphoresis and fever  Respiratory: Negative  Cardiovascular: Negative  Neurological: Negative  Psychiatric/Behavioral: Positive for decreased concentration  Negative for dysphoric mood  The patient is not nervous/anxious  Video Exam    There were no vitals filed for this visit  Physical Exam  Constitutional:       General: She is not in acute distress  Appearance: Normal appearance  She is obese  HENT:      Head: Normocephalic and atraumatic        Right Ear: External ear normal       Left Ear: External ear normal  Mouth/Throat:      Mouth: Mucous membranes are moist    Eyes:      Conjunctiva/sclera: Conjunctivae normal       Pupils: Pupils are equal, round, and reactive to light  Pulmonary:      Effort: Pulmonary effort is normal  No respiratory distress  Skin:     General: Skin is warm  Coloration: Skin is not pale  Neurological:      General: No focal deficit present  Mental Status: She is alert  Psychiatric:         Mood and Affect: Mood normal          Behavior: Behavior normal           I spent 15 minutes directly with the patient during this visit      VIRTUAL VISIT DISCLAIMER    Adriana Forbes acknowledges that she has consented to an online visit or consultation  She understands that the online visit is based solely on information provided by her, and that, in the absence of a face-to-face physical evaluation by the physician, the diagnosis she receives is both limited and provisional in terms of accuracy and completeness  This is not intended to replace a full medical face-to-face evaluation by the physician  Adriana Forbes understands and accepts these terms

## 2021-07-07 NOTE — PROGRESS NOTES
Bariatric Behavioral Health Evaluation    Presenting Problem: 24year old female ( 2000) here for behavioral health evaluation  Patient had initial consult with Dr Catia Haley 2021  Patient is wanting to improve her health  Is the patient seeking Bariatric Surgery Eval? Yes  If yes how long have you researched this surgery option  Patient reports that she has talked to her grandfather about his experience, he had bariatric surgery about 5 years ago  Also has done some research by watching videos online about bariatric surgery  Realizes Post- Op Requirements? Yes, but would benefit from more education  Pre-morbid level of function and history of present illness: Diagnosis of hypothyridism; patient reports struggling with her weight since she was 17, patient attributes this to her thyroid  Psychiatric/Psychological Treatment Diagnosis: Diagnosis of ADHD, monitored with medication prescribed by her PCP  Patient educated on the benefits of outpatient therapy to the bariatric patient while going through the process of surgery, resource list provided  Outpatient Counselor No     Psychiatrist No     Have you had Inpatient Treatment? No    Domestic Violence No    Abuse History:  Patient denies this  Additional comments/stressors related to family/relationships/peer support: Patient identifies her boyfriend and her family as her support  Patient identifies work as a current stressor  Physical/Psychological Assessment:     Appearance: appropriate  Sociability: average  Affect: appropriate  Mood: calm  Thought Process: coherent  Speech: normal  Content: no impairment  Orientation: person  Yes , place  Yes , time  Yes , normal attention span  Yes , normal memory  Yes   and normal judgement  Yes   Insight: emotional  good    Risk Assessment:     none    Recommendations: Recommended for surgery  yes    Risk of Harm to Self or Others: Patient denies SI or HI     Observation:     Interviews:  This interview only  Based on the previous information, the client presents the following risk of harm to self or others: low     Note: Diagnosis of ADHD, monitored with medication prescribed by her PCP  Patient educated on the benefits of outpatient therapy to the bariatric patient while going through the process of surgery, resource list provided  Patient not currently pregnant, educated patient on the reproductive hormonal changes post surgery  Encouraged patient to discuss birth control options with their doctor prior to surgery to protect against pregnancy for at least 12-18 months after surgery  Denies any substance abuse  Currently vaping, reports that this is occasional, more social  Alcohol use 2x per month  Patient educated on the impact of nicotine and alcohol on the post bariatric patient  Patient agrees to abstain from all substances prior to as well as after surgery  Patient meets criteria for surgery at this program and will follow up with RD next month  Patient will also need to schedule an EGD following the visit today

## 2021-07-09 NOTE — PROGRESS NOTES
Bariatric Nutrition Assessment Note - Initial Visit    Insurance: No weight checks required    Type of surgery    Interested in Gastric bypass: laparoscopic vs Sleeve but very unsure   Surgery Date: TBD  Surgeon: Dr Raz Lee on 5/28/2021    Nutrition Assessment   Luis Fonseca  24 y o   female     Height: 5'7 5"  Eval Weight: 296 6#   BMI: 45 8  Wt with BMI of 25: 162#  Pre-Op Excess Wt: 134 6#  BMI to Qualify at 40 = 259#  BMI to Qualify at 35 = 227#  PMH includes: ADHD, hypothyroidism, GERD, BRUCE suspected    Pt advised not to gain weight during preop process  Pt encouraged to lose weight via healthy eating and exercise  Pt may follow Liver Shrinking diet 2 weeks prior to DOS depending on BMI at time  This diet will promote weight loss  Blood pressure 118/84, pulse 84, temperature 97 5 °F (36 4 °C), temperature source Tympanic, height 5' 7 5" (1 715 m), weight 135 kg (296 lb 9 6 oz), not currently breastfeeding      Weight History  Reason for WLS: Has thyroid issues   Onset of Obesity: Childhood  Family history of obesity: Yes Grandfather had surgery   Wt Loss Attempts: Exercise  Self Created Diets (Portion Control, Healthy Food Choices, etc ) - quit drinking soda  Maximum Wt Lost: 5-10# fluctuates    Review of History and Medications   OTC: None  Past Medical History:   Diagnosis Date    ADHD (attention deficit hyperactivity disorder)     Hypothyroid     Morbid obesity with BMI of 45 0-49 9, adult (HCC)      Past Surgical History:   Procedure Laterality Date    FRACTURE SURGERY      WRIST SURGERY Right      Social History     Socioeconomic History    Marital status: Single     Spouse name: None    Number of children: None    Years of education: None    Highest education level: None   Occupational History    None   Tobacco Use    Smoking status: Never Smoker    Smokeless tobacco: Never Used   Vaping Use    Vaping Use: Some days    Substances: Nicotine, Flavoring   Substance and Sexual Activity    Alcohol use: Yes     Comment: socially    Drug use: Never    Sexual activity: Yes     Partners: Male     Birth control/protection: OCP   Other Topics Concern    None   Social History Narrative    None     Social Determinants of Health     Financial Resource Strain:     Difficulty of Paying Living Expenses:    Food Insecurity:     Worried About Running Out of Food in the Last Year:     [address] in the Last Year:    Transportation Needs: No Transportation Needs    Lack of Transportation (Medical): No    Lack of Transportation (Non-Medical):  No   Physical Activity:     Days of Exercise per Week:     Minutes of Exercise per Session:    Stress:     Feeling of Stress :    Social Connections:     Frequency of Communication with Friends and Family:     Frequency of Social Gatherings with Friends and Family:     Attends Muslim Services:     Active Member of Clubs or Organizations:     Attends Club or Organization Meetings:     Marital Status:    Intimate Partner Violence:     Fear of Current or Ex-Partner:     Emotionally Abused:     Physically Abused:     Sexually Abused:        Current Outpatient Medications:     amphetamine-dextroamphetamine (ADDERALL XR) 25 MG 24 hr capsule, Take 1 capsule (25 mg total) by mouth every morningMax Daily Amount: 25 mg, Disp: 30 capsule, Rfl: 0    levothyroxine 50 mcg tablet, Take 1 tablet (50 mcg total) by mouth daily, Disp: 90 tablet, Rfl: 1    norethindrone-ethinyl estradiol-iron (MICROGESTIN FE1 5/30) 1 5-30 MG-MCG tablet, Take 1 tablet by mouth daily, Disp: 28 tablet, Rfl: 11     Food Intake and Lifestyle Assessment   Food Intake Assessment completed via usual diet recall  Breakfast: Skips Takes thyroid pill and adderall (not hungry)  Lunch: SW and PB and jelly crackers  Dinner: Boyfriends parents - protein only , maybe salad - no starch  Snacks: minimal snacking  Beverage intake: water  Protein supplement: None  Estimated protein intake per day: 75-80grams  Estimated fluid intake per day: 16 oz Water   Meals eaten away from home: Fast food maybe 1X week  Typical meal pattern: 2 meals per day and 0-1 snacks per day  Eating Behaviors: Consumption of high calorie/ high fat foods, Large portion sizes and Emotional eating  Craves chicken  Not mindful of how much eats in a day  Food allergies or intolerances: No Known Allergies or intolerances  Cultural or Anabaptist considerations: None    Physical Assessment  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  Types of exercise: Plays volleyball or other activity on w/e  Current physical limitations: None    Psychosocial Assessment   Support systems: significant other relative(s)  Socioeconomic factors: None    Nutrition Diagnosis  Diagnosis: Overweight / Obesity (NC-3 3)  Related to: Physical inactivity and Excessive energy intake  As Evidenced by: BMI >25     Nutrition Prescription: Recommend the following diet  Low fat, Low sugar, High protein and Regular    Interventions and Teaching   Discussed pre-op and post-op nutrition guidelines  Patient educated and handouts provided    Surgical changes to stomach / GI  Capacity of post-surgery stomach  Diet progression  Adequate hydration  Sugar and fat restriction to decrease "dumping syndrome"  Fat restriction to decrease steatorrhea  Expected weight loss  Weight loss plateaus/ possibility of weight regain  Exercise  Suggestions for pre-op diet  Nutrition considerations after surgery  Protein supplements  Meal planning and preparation  Appropriate carbohydrate, protein, and fat intake, and food/fluid choices to maximize safe weight loss, nutrient intake, and tolerance   Dietary and lifestyle changes  Possible problems with poor eating habits  Intuitive eating  Techniques for self monitoring and keeping daily food journal  Potential for food intolerance after surgery, and ways to deal with them including: lactose intolerance, nausea, reflux, vomiting, diarrhea, food intolerance, appetite changes, gas  Vitamin / Mineral supplementation of Multivitamin with minerals, Calcium, Vitamin B12, Iron and Vitamin D    Education provided to: patient    Barriers to learning: No barriers identified  Readiness to change: preparation    Prior research on procedure: discussed with provider and friends or family    Comprehension: verbalizes understanding     Expected Compliance: good  Recommendations  Pt is an appropriate candidate for surgery   Yes    Evaluation / Monitoring  Dietitian to Monitor: Eating pattern as discussed Tolerance of nutrition prescription Body weight Lab values Physical activity Bowel pattern    Goals  Food journal, Exercise 30 minutes 5 times per week, Complete lession plans 1-6, Eat 3 meals per day and Eliminate mindless snacking   Follow Pre-Surgery guidelines  > Trial Baritastic for food logging  > Establish regular meal pattern - include fruits, vegetables and whole grains  > No skipping meals-  Include breakfast meal   > Focus on protein - include lean protein at each meal and snack - Can use protein drink as meal replacement  > Decrease portions  > Limit processed foods, fast foods and dining away from home  > Limit snacks - healthier choices and portion; avoid grazing  > Slow pace of eating and drinking - practice 30/60 minute rule  > Continue to avoid caffeinated and carbonated  Products  > Increase water intake - 64 oz  > Increase physical activity/establish exercise regimen   > Start multi vitamin and additional Vitamin D 2000IU  Continue to research procedures to help in decision RNY vs sleeve  Work on skills to cope with emotional eating/mindfull eating  F/U next month with bariatric provider      Time Spent:   1 Hour

## 2021-07-12 ENCOUNTER — PREP FOR PROCEDURE (OUTPATIENT)
Dept: BARIATRICS | Facility: CLINIC | Age: 21
End: 2021-07-12

## 2021-07-12 ENCOUNTER — CLINICAL SUPPORT (OUTPATIENT)
Dept: BARIATRICS | Facility: CLINIC | Age: 21
End: 2021-07-12

## 2021-07-12 VITALS
TEMPERATURE: 97.5 F | DIASTOLIC BLOOD PRESSURE: 84 MMHG | SYSTOLIC BLOOD PRESSURE: 118 MMHG | HEIGHT: 68 IN | WEIGHT: 293 LBS | BODY MASS INDEX: 44.41 KG/M2 | HEART RATE: 84 BPM

## 2021-07-12 DIAGNOSIS — E66.01 MORBID (SEVERE) OBESITY DUE TO EXCESS CALORIES (HCC): Primary | ICD-10-CM

## 2021-07-12 DIAGNOSIS — Z01.818 PREOP TESTING: ICD-10-CM

## 2021-07-12 DIAGNOSIS — Z98.84 BARIATRIC SURGERY STATUS: Primary | ICD-10-CM

## 2021-07-12 DIAGNOSIS — E66.01 MORBID OBESITY (HCC): Primary | ICD-10-CM

## 2021-07-12 PROCEDURE — RECHECK

## 2021-07-16 ENCOUNTER — OFFICE VISIT (OUTPATIENT)
Dept: SLEEP CENTER | Facility: CLINIC | Age: 21
End: 2021-07-16
Payer: COMMERCIAL

## 2021-07-16 VITALS — HEIGHT: 68 IN | WEIGHT: 293 LBS | BODY MASS INDEX: 44.41 KG/M2

## 2021-07-16 DIAGNOSIS — Z91.89 AT RISK FOR SLEEP APNEA: ICD-10-CM

## 2021-07-16 DIAGNOSIS — E66.01 MORBID OBESITY WITH BMI OF 45.0-49.9, ADULT (HCC): ICD-10-CM

## 2021-07-16 DIAGNOSIS — R06.83 SNORING: Primary | ICD-10-CM

## 2021-07-16 DIAGNOSIS — F90.8 ATTENTION DEFICIT HYPERACTIVITY DISORDER (ADHD), OTHER TYPE: ICD-10-CM

## 2021-07-16 PROCEDURE — 3008F BODY MASS INDEX DOCD: CPT | Performed by: PHYSICIAN ASSISTANT

## 2021-07-16 PROCEDURE — 99204 OFFICE O/P NEW MOD 45 MIN: CPT | Performed by: PSYCHIATRY & NEUROLOGY

## 2021-07-16 NOTE — PATIENT INSTRUCTIONS
Recommendations:  1) HST with in lab PSG if non-diagnostic  2) Driving safety was reviewed with patient  If the patient feels too sleepy to drive she knows not to drive  If she becomes sleepy while driving she will pull over and nap  3) Follow-up after initiation of treatment  4) Call with any questions or concerns     5) weight loss and continue to go to weight management program

## 2021-07-16 NOTE — LETTER
July 16, 2021     Corrine Galindo PA-C  [address]  [place] [facility]  [id number]    Patient: Macie Pierce   YOB: 2000   Date of Visit: 7/16/2021       Dear Dr Uziel Monroy: Thank you for referring Macie Pierce to me for evaluation  Below are my notes for this consultation  If you have questions, please do not hesitate to call me  I look forward to following your patient along with you  Sincerely,        Brennon Yeager MD        CC: JOHANNA Ramirez MD  7/16/2021  3:30 PM  Sign when Signing Visit  Sleep Medicine Consultation Note    HPI:  Ms Macie Pierce is a 24 y o  female seen at the request of Samantha Orozco PA-C for advice regarding suspected sleep disordered breathing  The patient stated that she has gained about 100lbs over the last 2 years  Her boyfriend tells her that she snores at times, but not always  She denied observed apneas or gasping  She has also been diagnosed with ADHD since she was in 5th grade  She was then given Adderall since then  Please see below for continuation of the HPI:      Sleep Disordered Breathing:  -Snoring: yes   -Severity: unsure how loud   -Frequency: unsure   -Duration: unsure   -Over time: unsure   -Modifying factors: unsure  -Observed Apneas: no  -Mouth Breathing at night: yes  -Dry Mouth in morning: sometimes   -Nocturnal Gasping: no  -Nasal Obstruction: no  -Weight: gained about 100 lbs from her best of 200 lbs over the last 2 years    Sleep Pattern:  -Location: bedroom   -Bed/Recliner/Wedge: bed  -Bed Partner: yes  -HOB: flat  -# of pillows under head: 1  -Position: side  -Bedtime: 10pm  -Lights out: same time  -Environmental: watches TV for an hour then turns it off to sleep   She is also on her phone    -Latency: 1h  -Awakenings: 2-3   -Reason: not sure   -Duration: mins  -Wake time: 730am   -Alarm: yes  -Rise time: same time  -Days off: 12-10a  -Shift Work: M-F days  -Patient's estimate of total sleep time: at least 7-8h    Daytime Symptoms:  -Upon Awakening: tired, but better as soon as she wakes up  -Daytime fatigue/sleepiness: sometimes sleepy during the day  -Naps: thee times a month  -Involuntary Dozing: no  -Cognitive Symptoms: no  -Driving: Difficulty with sleepiness and driving:  no   -- Close calls related to sleepiness: no   -- Accidents related to sleepiness: no      Questionnaires:  Sitting and reading: Slight chance of dozing  Watching TV: Slight chance of dozing  Sitting, inactive in a public place (e g  a theatre or a meeting): Slight chance of dozing  As a passenger in a car for an hour without a break: Moderate chance of dozing  Lying down to rest in the afternoon when circumstances permit: Moderate chance of dozing  Sitting and talking to someone: Would never doze  Sitting quietly after a lunch without alcohol: Would never doze  In a car, while stopped for a few minutes in traffic: Would never doze  Total score: 7      Sleep Review of Symptoms:  -Parasomnias:  --Sleep Walking: no  --Dream Enactment: no  --Bruxism: no  -Motor:  --RLS: no  --PLMS: no  -Narcolepsy:  --Hallucinations: no  --Paralysis: no  --Cataplexy: no    Childhood Sleep History: denied    Prior Sleep Studies/Evaluations:  no    Family History:  Family history of sleep disorders: dad, MGM, MGF all snore    Patient Active Problem List   Diagnosis    General counseling and advice for contraceptive management    ADHD (attention deficit hyperactivity disorder)    Morbid obesity with BMI of 45 0-49 9, adult ([facility] )    Acquired hypothyroidism     Past Medical History:   Diagnosis Date    ADHD (attention deficit hyperactivity disorder)     Hypothyroid     Morbid obesity with BMI of 45 0-49 9, adult ([facility] )          --> Denies any cardiopulmonary disease  --> Seizure hx: denies  --> Head injury with LOC: denies  --> Supplemental Oxygen Use: denies    Labs   Results for Rosalind Delgado (MRN [id number]) as of 7/16/2021 15:13   Ref  Range 11/23/2020 11:20 1/7/2021 10:29 2/8/2021 13:29 6/17/2021 12:44   Sodium Latest Ref Range: 136 - 145 mmol/L 138      Potassium Latest Ref Range: 3 5 - 5 3 mmol/L 4 1      Chloride Latest Ref Range: 100 - 108 mmol/L 108      CO2 Latest Ref Range: 21 - 32 mmol/L 24      Anion Gap Latest Ref Range: 4 - 13 mmol/L 6      BUN Latest Ref Range: 5 - 25 mg/dL 13      Creatinine Latest Ref Range: 0 60 - 1 30 mg/dL 0 79      GLUCOSE FASTING Latest Ref Range: 65 - 99 mg/dL 76      Calcium Latest Ref Range: 8 3 - 10 1 mg/dL 9 1      AST Latest Ref Range: 5 - 45 U/L 10      ALT Latest Ref Range: 12 - 78 U/L 19      Alkaline Phosphatase Latest Ref Range: 46 - 116 U/L 44 (L)      Total Protein Latest Ref Range: 6 4 - 8 2 g/dL 8 1      Albumin Latest Ref Range: 3 5 - 5 0 g/dL 4 0      TOTAL BILIRUBIN Latest Ref Range: 0 20 - 1 00 mg/dL 0 29      eGFR Latest Units: ml/min/1 73sq m 107      Cholesterol Latest Ref Range: 50 - 200 mg/dL 192      Triglycerides Latest Ref Range: <=150 mg/dL 129      HDL Latest Ref Range: >=40 mg/dL 68      Non-HDL Cholesterol Latest Units: mg/dl 124      LDL Calculated Latest Ref Range: 0 - 100 mg/dL 98      WBC Latest Ref Range: 4 31 - 10 16 Thousand/uL 7 29      Red Blood Cell Count Latest Ref Range: 3 81 - 5 12 Million/uL 4 60      Hemoglobin Latest Ref Range: 11 5 - 15 4 g/dL 13 8      HCT Latest Ref Range: 34 8 - 46 1 % 42 4      MCV Latest Ref Range: 82 - 98 fL 92      MCH Latest Ref Range: 26 8 - 34 3 pg 30 0      MCHC Latest Ref Range: 31 4 - 37 4 g/dL 32 5      RDW Latest Ref Range: 11 6 - 15 1 % 11 9      Platelet Count Latest Ref Range: 149 - 390 Thousands/uL 384      MPV Latest Ref Range: 8 9 - 12 7 fL 9 8      nRBC Latest Units: /100 WBCs 0      Neutrophils % Latest Ref Range: 43 - 75 % 60      Immat GRANS % Latest Ref Range: 0 - 2 % 0      Lymphocytes Relative Latest Ref Range: 14 - 44 % 31      Monocytes Relative Latest Ref Range: 4 - 12 % 6      Eosinophils Latest Ref Range: 0 - 6 % 2 Basophils Relative Latest Ref Range: 0 - 1 % 1      Immature Grans Absolute Latest Ref Range: 0 00 - 0 20 Thousand/uL 0 01      Absolute Neutrophils Latest Ref Range: 1 85 - 7 62 Thousands/µL 4 40      Lymphocytes Absolute Latest Ref Range: 0 60 - 4 47 Thousands/µL 2 24      Absolute Monocytes Latest Ref Range: 0 17 - 1 22 Thousand/µL 0 47      Absolute Eosinophils Latest Ref Range: 0 00 - 0 61 Thousand/µL 0 12      Basophils Absolute Latest Ref Range: 0 00 - 0 10 Thousands/µL 0 05      Hemoglobin A1C Latest Ref Range: Normal 3 8-5 6%; PreDiabetic 5 7-6 4%; Diabetic >=6 5%; Glycemic control for adults with diabetes <7 0% % 4 9      eAG, EST AVG Glucose Latest Units: mg/dl 94      TSH 3RD GENERATON Latest Ref Range: 0 358 - 3 740 uIU/mL 5 650 (H) 6 660 (H)  5 700 (H)   Free T4 Latest Ref Range: 0 76 - 1 46 ng/dL 0 93 0 98  0 95   T3, Total Latest Ref Range: 0 60 - 1 80 ng/mL  1 80     THYROGLOBULIN AB Latest Ref Range: 0 0 - 0 9 IU/mL    91 4 (H)   THYROID MICROSOMAL ANTIBODY Latest Ref Range: 0 - 34 IU/mL    138 (H)   SARS-COV-2 Latest Ref Range: Negative    Negative    NOVEL CORONAVIRUS (COVID-19), PCR [facility] Unknown   Rpt          Past Surgical History:   Procedure Laterality Date    FRACTURE SURGERY      WRIST SURGERY Right        --> ENT procedures: denies    Current Outpatient Medications   Medication Sig Dispense Refill    amphetamine-dextroamphetamine (ADDERALL XR) 25 MG 24 hr capsule Take 1 capsule (25 mg total) by mouth every morningMax Daily Amount: 25 mg 30 capsule 0    levothyroxine 50 mcg tablet Take 1 tablet (50 mcg total) by mouth daily 90 tablet 1    norethindrone-ethinyl estradiol-iron (MICROGESTIN FE1 5/30) 1 5-30 MG-MCG tablet Take 1 tablet by mouth daily 28 tablet 11     No current facility-administered medications for this visit           Social History:  -Employment:   -Smoking: no  -Caffeine: occasional soda  -Alcohol: socially  -THC: no  -OTC/Supplements/herbals: no  -Illicits:  denies  -Family: lives with boyfriend    ROS:  Genitourinary none   Cardiology none   Gastrointestinal none   Neurology none   Constitutional weight change   Integumentary none   Psychiatry none   Musculoskeletal none   Pulmonary snoring   ENT none   Endocrine none   Hematological none         MSE:  -Alert and appropriate: alert, calm, cooperative  -Oriented to person, place and time:  name, age, location, day/date/mon/yr  -Behavior: good, sustained eye contact  -Speech: Unremarkable rate/rhythm/volume  -Mood: "always good"  -Affect: mood congruent and full range  -Thought Processes: linear, logical, goal directed  PE:  Body mass index is 45 37 kg/m²  Vitals:    07/16/21 1500   Weight: 133 kg (294 lb)   Height: 5' 7 5" (1 715 m)       -General:  In NAD    -Eyes: Conjunctival injection: none     -EOM:  PERRLA, EOMI   -Eyelid hooding: no    -ENT: MP: 4/4   -Facial deformity: no retrognathia   -Hard palate: moderate arch   -Soft palate:  No crowding   -Gums and teeth: normal dentition   -Tongue:  Scalloping   -Nares:  Patent    -Neck/Lymphatics: Lymphadenopathy:  none appreciated   -Masses:  none appreciated   -Circumference: Neck Circumference: 16 "    -Cardiac: Auscultation:  RRR   - LE edema over shins: none appreciated    -Pulm: -Respirations: unlaboured         -Auscultation:  CTA bilaterally, posterior fields    -Neuro: No resting tremor     -Musculoskeletal: Gait and stance: normal turning and ambulation; unremarkable  Assessment:  Ms Travis Silva is a 24 y o  female who is seen to evaluate for possible obstructive sleep apnea  Given the patient's symptoms of snoring, daytime tiredness, and non-restorative sleep, in the context of a narrow airway, obesity, hypothyroidism, and a family history of BRUCE, a diagnosis of obstructive sleep apnea is likely  The pathophysiology of, the reasons to treat and treatment options for obstructive sleep apnea were all reviewed with the patient today  Discussed the testing options and reviewed the benefits and downsides of both, patient opted for home sleep apnea  She is amenable to treatment with PAP therapy  Discussed keeping nasal passages clear, abstaining from alcohol, and other sedating drugs at night- which will worsen symptoms of BRUCE  She is going for weight loss surgery this winter possibly  --History provided by: patient   --Records reviewed: in chart      Recommendations:  1) HST with in lab PSG if non-diagnostic  2) Driving safety was reviewed with patient  If the patient feels too sleepy to drive she knows not to drive  If she becomes sleepy while driving she will pull over and nap  3) Follow-up after initiation of treatment  4) Call with any questions or concerns  5) weight loss and continue to go to weight management program    All questions answered for the patient, who indicated understanding and agreed with the plan       Robby Avila MD  Psychiatry/ Sleep medicine

## 2021-07-16 NOTE — PROGRESS NOTES
Sleep Medicine Consultation Note    HPI:  Ms Jean-Claude Barbosa is a 24 y o  female seen at the request of Alcides Gomez PA-C for advice regarding suspected sleep disordered breathing  The patient stated that she has gained about 100lbs over the last 2 years  Her boyfriend tells her that she snores at times, but not always  She denied observed apneas or gasping  She has also been diagnosed with ADHD since she was in 5th grade  She was then given Adderall since then  Please see below for continuation of the HPI:      Sleep Disordered Breathing:  -Snoring: yes   -Severity: unsure how loud   -Frequency: unsure   -Duration: unsure   -Over time: unsure   -Modifying factors: unsure  -Observed Apneas: no  -Mouth Breathing at night: yes  -Dry Mouth in morning: sometimes   -Nocturnal Gasping: no  -Nasal Obstruction: no  -Weight: gained about 100 lbs from her best of 200 lbs over the last 2 years    Sleep Pattern:  -Location: bedroom   -Bed/Recliner/Wedge: bed  -Bed Partner: yes  -HOB: flat  -# of pillows under head: 1  -Position: side  -Bedtime: 10pm  -Lights out: same time  -Environmental: watches TV for an hour then turns it off to sleep   She is also on her phone    -Latency: 1h  -Awakenings: 2-3   -Reason: not sure   -Duration: mins  -Wake time: 730am   -Alarm: yes  -Rise time: same time  -Days off: 12-10a  -Shift Work: M-F days  -Patient's estimate of total sleep time: at least 7-8h    Daytime Symptoms:  -Upon Awakening: tired, but better as soon as she wakes up  -Daytime fatigue/sleepiness: sometimes sleepy during the day  -Naps: thee times a month  -Involuntary Dozing: no  -Cognitive Symptoms: no  -Driving: Difficulty with sleepiness and driving:  no   -- Close calls related to sleepiness: no   -- Accidents related to sleepiness: no      Questionnaires:  Sitting and reading: Slight chance of dozing  Watching TV: Slight chance of dozing  Sitting, inactive in a public place (e g  a theatre or a meeting): Slight chance of dozing  As a passenger in a car for an hour without a break: Moderate chance of dozing  Lying down to rest in the afternoon when circumstances permit: Moderate chance of dozing  Sitting and talking to someone: Would never doze  Sitting quietly after a lunch without alcohol: Would never doze  In a car, while stopped for a few minutes in traffic: Would never doze  Total score: 7      Sleep Review of Symptoms:  -Parasomnias:  --Sleep Walking: no  --Dream Enactment: no  --Bruxism: no  -Motor:  --RLS: no  --PLMS: no  -Narcolepsy:  --Hallucinations: no  --Paralysis: no  --Cataplexy: no    Childhood Sleep History: denied    Prior Sleep Studies/Evaluations:  no    Family History:  Family history of sleep disorders: dad, MGM, MGF all snore    Patient Active Problem List   Diagnosis    General counseling and advice for contraceptive management    ADHD (attention deficit hyperactivity disorder)    Morbid obesity with BMI of 45 0-49 9, adult ([place] [id number] )    Acquired hypothyroidism     Past Medical History:   Diagnosis Date    ADHD (attention deficit hyperactivity disorder)     Hypothyroid     Morbid obesity with BMI of 45 0-49 9, adult ([facility] )          --> Denies any cardiopulmonary disease  --> Seizure hx: denies  --> Head injury with LOC: denies  --> Supplemental Oxygen Use: denies    Labs   Results for Sheyla Perez (MRN [id number]) as of 7/16/2021 15:13   Ref   Range 11/23/2020 11:20 1/7/2021 10:29 2/8/2021 13:29 6/17/2021 12:44   Sodium Latest Ref Range: 136 - 145 mmol/L 138      Potassium Latest Ref Range: 3 5 - 5 3 mmol/L 4 1      Chloride Latest Ref Range: 100 - 108 mmol/L 108      CO2 Latest Ref Range: 21 - 32 mmol/L 24      Anion Gap Latest Ref Range: 4 - 13 mmol/L 6      BUN Latest Ref Range: 5 - 25 mg/dL 13      Creatinine Latest Ref Range: 0 60 - 1 30 mg/dL 0 79      GLUCOSE FASTING Latest Ref Range: 65 - 99 mg/dL 76      Calcium Latest Ref Range: 8 3 - 10 1 mg/dL 9 1      AST Latest Ref Range: 5 - 45 U/L 10      ALT Latest Ref Range: 12 - 78 U/L 19      Alkaline Phosphatase Latest Ref Range: 46 - 116 U/L 44 (L)      Total Protein Latest Ref Range: 6 4 - 8 2 g/dL 8 1      Albumin Latest Ref Range: 3 5 - 5 0 g/dL 4 0      TOTAL BILIRUBIN Latest Ref Range: 0 20 - 1 00 mg/dL 0 29      eGFR Latest Units: ml/min/1 73sq m 107      Cholesterol Latest Ref Range: 50 - 200 mg/dL 192      Triglycerides Latest Ref Range: <=150 mg/dL 129      HDL Latest Ref Range: >=40 mg/dL 68      Non-HDL Cholesterol Latest Units: mg/dl 124      LDL Calculated Latest Ref Range: 0 - 100 mg/dL 98      WBC Latest Ref Range: 4 31 - 10 16 Thousand/uL 7 29      Red Blood Cell Count Latest Ref Range: 3 81 - 5 12 Million/uL 4 60      Hemoglobin Latest Ref Range: 11 5 - 15 4 g/dL 13 8      HCT Latest Ref Range: 34 8 - 46 1 % 42 4      MCV Latest Ref Range: 82 - 98 fL 92      MCH Latest Ref Range: 26 8 - 34 3 pg 30 0      MCHC Latest Ref Range: 31 4 - 37 4 g/dL 32 5      RDW Latest Ref Range: 11 6 - 15 1 % 11 9      Platelet Count Latest Ref Range: 149 - 390 Thousands/uL 384      MPV Latest Ref Range: 8 9 - 12 7 fL 9 8      nRBC Latest Units: /100 WBCs 0      Neutrophils % Latest Ref Range: 43 - 75 % 60      Immat GRANS % Latest Ref Range: 0 - 2 % 0      Lymphocytes Relative Latest Ref Range: 14 - 44 % 31      Monocytes Relative Latest Ref Range: 4 - 12 % 6      Eosinophils Latest Ref Range: 0 - 6 % 2      Basophils Relative Latest Ref Range: 0 - 1 % 1      Immature Grans Absolute Latest Ref Range: 0 00 - 0 20 Thousand/uL 0 01      Absolute Neutrophils Latest Ref Range: 1 85 - 7 62 Thousands/µL 4 40      Lymphocytes Absolute Latest Ref Range: 0 60 - 4 47 Thousands/µL 2 24      Absolute Monocytes Latest Ref Range: 0 17 - 1 22 Thousand/µL 0 47      Absolute Eosinophils Latest Ref Range: 0 00 - 0 61 Thousand/µL 0 12      Basophils Absolute Latest Ref Range: 0 00 - 0 10 Thousands/µL 0 05      Hemoglobin A1C Latest Ref Range: Normal 3 8-5 6%;  PreDiabetic 5 7-6 4%; Diabetic >=6 5%; Glycemic control for adults with diabetes <7 0% % 4 9      eAG, EST AVG Glucose Latest Units: mg/dl 94      TSH 3RD GENERATON Latest Ref Range: 0 358 - 3 740 uIU/mL 5 650 (H) 6 660 (H)  5 700 (H)   Free T4 Latest Ref Range: 0 76 - 1 46 ng/dL 0 93 0 98  0 95   T3, Total Latest Ref Range: 0 60 - 1 80 ng/mL  1 80     THYROGLOBULIN AB Latest Ref Range: 0 0 - 0 9 IU/mL    91 4 (H)   THYROID MICROSOMAL ANTIBODY Latest Ref Range: 0 - 34 IU/mL    138 (H)   SARS-COV-2 Latest Ref Range: Negative    Negative    NOVEL CORONAVIRUS (COVID-19), PCR [facility] Unknown   Rpt          Past Surgical History:   Procedure Laterality Date    FRACTURE SURGERY      WRIST SURGERY Right        --> ENT procedures: denies    Current Outpatient Medications   Medication Sig Dispense Refill    amphetamine-dextroamphetamine (ADDERALL XR) 25 MG 24 hr capsule Take 1 capsule (25 mg total) by mouth every morningMax Daily Amount: 25 mg 30 capsule 0    levothyroxine 50 mcg tablet Take 1 tablet (50 mcg total) by mouth daily 90 tablet 1    norethindrone-ethinyl estradiol-iron (MICROGESTIN FE1 5/30) 1 5-30 MG-MCG tablet Take 1 tablet by mouth daily 28 tablet 11     No current facility-administered medications for this visit           Social History:  -Employment:   -Smoking: no  -Caffeine: occasional soda  -Alcohol: socially  -THC: no  -OTC/Supplements/herbals: no  -Illicits:  denies  -Family: lives with boyfriend    ROS:  Genitourinary none   Cardiology none   Gastrointestinal none   Neurology none   Constitutional weight change   Integumentary none   Psychiatry none   Musculoskeletal none   Pulmonary snoring   ENT none   Endocrine none   Hematological none         MSE:  -Alert and appropriate: alert, calm, cooperative  -Oriented to person, place and time:  name, age, location, day/date/mon/yr  -Behavior: good, sustained eye contact  -Speech: Unremarkable rate/rhythm/volume  -Mood: "always good"  -Affect: mood congruent and full range  -Thought Processes: linear, logical, goal directed  PE:  Body mass index is 45 37 kg/m²  Vitals:    07/16/21 1500   Weight: 133 kg (294 lb)   Height: 5' 7 5" (1 715 m)       -General:  In NAD    -Eyes: Conjunctival injection: none     -EOM:  PERRLA, EOMI   -Eyelid hooding: no    -ENT: MP: 4/4   -Facial deformity: no retrognathia   -Hard palate: moderate arch   -Soft palate:  No crowding   -Gums and teeth: normal dentition   -Tongue:  Scalloping   -Nares:  Patent    -Neck/Lymphatics: Lymphadenopathy:  none appreciated   -Masses:  none appreciated   -Circumference: Neck Circumference: 16 "    -Cardiac: Auscultation:  RRR   - LE edema over shins: none appreciated    -Pulm: -Respirations: unlaboured         -Auscultation:  CTA bilaterally, posterior fields    -Neuro: No resting tremor     -Musculoskeletal: Gait and stance: normal turning and ambulation; unremarkable  Assessment:  Ms Zarina Purcell is a 24 y o  female who is seen to evaluate for possible obstructive sleep apnea  Given the patient's symptoms of snoring, daytime tiredness, and non-restorative sleep, in the context of a narrow airway, obesity, hypothyroidism, and a family history of BRUCE, a diagnosis of obstructive sleep apnea is likely  The pathophysiology of, the reasons to treat and treatment options for obstructive sleep apnea were all reviewed with the patient today  Discussed the testing options and reviewed the benefits and downsides of both, patient opted for home sleep apnea  She is amenable to treatment with PAP therapy  Discussed keeping nasal passages clear, abstaining from alcohol, and other sedating drugs at night- which will worsen symptoms of BRUCE  She is going for weight loss surgery this winter possibly  --History provided by: patient   --Records reviewed: in chart      Recommendations:  1) HST with in lab PSG if non-diagnostic  2) Driving safety was reviewed with patient    If the patient feels too sleepy to drive she knows not to drive  If she becomes sleepy while driving she will pull over and nap  3) Follow-up after initiation of treatment  4) Call with any questions or concerns  5) weight loss and continue to go to weight management program    All questions answered for the patient, who indicated understanding and agreed with the plan       Nora Palacios MD  Psychiatry/ Sleep medicine

## 2021-08-11 ENCOUNTER — TELEPHONE (OUTPATIENT)
Dept: FAMILY MEDICINE CLINIC | Facility: CLINIC | Age: 21
End: 2021-08-11

## 2021-08-11 NOTE — TELEPHONE ENCOUNTER
Pt called that she has a yeast infection  She was advised we are fully booked and to call her OB  She said they are booked until October so [facility] will go to  [facility]

## 2021-08-12 ENCOUNTER — OFFICE VISIT (OUTPATIENT)
Dept: URGENT CARE | Facility: CLINIC | Age: 21
End: 2021-08-12
Payer: COMMERCIAL

## 2021-08-12 ENCOUNTER — TELEPHONE (OUTPATIENT)
Dept: GASTROENTEROLOGY | Facility: HOSPITAL | Age: 21
End: 2021-08-12

## 2021-08-12 VITALS
RESPIRATION RATE: 18 BRPM | HEIGHT: 68 IN | WEIGHT: 293 LBS | TEMPERATURE: 98.2 F | OXYGEN SATURATION: 98 % | BODY MASS INDEX: 44.41 KG/M2 | SYSTOLIC BLOOD PRESSURE: 143 MMHG | DIASTOLIC BLOOD PRESSURE: 100 MMHG | HEART RATE: 98 BPM

## 2021-08-12 DIAGNOSIS — B37.3 VAGINAL YEAST INFECTION: Primary | ICD-10-CM

## 2021-08-12 DIAGNOSIS — R30.0 DYSURIA: ICD-10-CM

## 2021-08-12 LAB
SL AMB  POCT GLUCOSE, UA: NORMAL
SL AMB LEUKOCYTE ESTERASE,UA: NORMAL
SL AMB POCT BILIRUBIN,UA: NORMAL
SL AMB POCT BLOOD,UA: NORMAL
SL AMB POCT CLARITY,UA: CLEAR
SL AMB POCT COLOR,UA: YELLOW
SL AMB POCT KETONES,UA: NORMAL
SL AMB POCT NITRITE,UA: NORMAL
SL AMB POCT PH,UA: 5
SL AMB POCT SPECIFIC GRAVITY,UA: 1.01
SL AMB POCT URINE HCG: NORMAL
SL AMB POCT URINE PROTEIN: NORMAL
SL AMB POCT UROBILINOGEN: 0.2

## 2021-08-12 PROCEDURE — 99213 OFFICE O/P EST LOW 20 MIN: CPT | Performed by: PHYSICIAN ASSISTANT

## 2021-08-12 PROCEDURE — 81002 URINALYSIS NONAUTO W/O SCOPE: CPT | Performed by: PHYSICIAN ASSISTANT

## 2021-08-12 PROCEDURE — 81025 URINE PREGNANCY TEST: CPT | Performed by: PHYSICIAN ASSISTANT

## 2021-08-12 RX ORDER — FLUCONAZOLE 150 MG/1
150 TABLET ORAL ONCE
Qty: 1 TABLET | Refills: 0 | Status: SHIPPED | OUTPATIENT
Start: 2021-08-12 | End: 2021-08-13

## 2021-08-12 NOTE — PROGRESS NOTES
[email or web address] [facility]          NAME: Adriana Forbes is a 24 y o  female  : 2000    MRN: [id number]  DATE: 2021  TIME: 6:08 PM    Assessment and Plan   Vaginal yeast infection [B37 3]  1  Vaginal yeast infection  fluconazole (DIFLUCAN) 150 mg tablet   2  Dysuria  POCT urine dip    POCT urine HCG       Patient Instructions      Yeast Infection   WHAT YOU NEED TO KNOW:   A yeast infection, or vaginal candidiasis, is a common vaginal infection  A yeast infection is caused by a fungus, or yeast-like germ  Fungi are normally found in your vagina  Too many fungi can cause an infection  DISCHARGE INSTRUCTIONS:   Call your doctor or gynecologist if:   · You have a fever and chills  · You develop abdominal or pelvic pain  · Your discharge is bloody and it is not your monthly period  · Your signs and symptoms get worse, even after treatment  · You have questions or concerns about your condition or care  Medicines:   · Medicines  help treat the fungal infection and decrease inflammation  The medicine may be a pill, cream, ointment, or vaginal tablet or suppository  · Take your medicine as directed  Contact your healthcare provider if you think your medicine is not helping or if you have side effects  Tell him of her if you are allergic to any medicine  Keep a list of the medicines, vitamins, and herbs you take  Include the amounts, and when and why you take them  Bring the list or the pill bottles to follow-up visits  Carry your medicine list with you in case of an emergency  Keep your vagina healthy:   · Clean your genital area with mild soap and warm water each day  Do not get soap inside your vagina  Gently dry the area after washing  Do not use hot tubs  The heat and moisture from hot tubs can increase your risk for another yeast infection  · Always wipe from front to back  after you use the toilet   This prevents spreading bacteria from your rectal area into your vagina  · Do not wear tight-fitting clothes or undergarments  for long periods of time  Wear cotton underwear during the day  Cotton helps keep your genital area dry and does not hold in warmth or moisture  Do not wear underwear at night  · Do not douche  or use feminine hygiene sprays or bubble bath  Do not use pads or tampons that are scented, or colored or perfumed toilet paper  · Do not have sex until your symptoms go away  Have your partner wear a condom until you complete your course of medication  · Ask your healthcare provider about birth control options if necessary  Condoms have latex and diaphragms have gel that kills sperm  Both of these may irritate your genital area  Follow up with your doctor or gynecologist as directed:  Write down your questions so you remember to ask them during your visits  © Copyright [employer] 2021 Information is for End User's use only and may not be sold, redistributed or otherwise used for commercial purposes  All illustrations and images included in CareNotes® are the copyrighted property of A D A M , Inc  or [address] [place], [place] [address]   The above information is an  only  It is not intended as medical advice for individual conditions or treatments  Talk to your doctor, nurse or pharmacist before following any medical regimen to see if it is safe and effective for you  To present to the ER if symptoms worsen  Chief Complaint     Chief Complaint   Patient presents with    Vaginitis     x 2 weeks         History of Present Illness   Gilbert Emerson presents to the clinic c/o    Admits to missing a few of her birth control pills  Denies any concern for STIs  Vaginal Discharge  The patient's primary symptoms include genital itching and vaginal discharge  The patient's pertinent negatives include no genital lesions, genital odor, pelvic pain or vaginal bleeding  This is a new problem  The current episode started 1 to 4 weeks ago   The problem occurs constantly  The problem has been unchanged  The pain is mild  The problem affects both sides  She is not pregnant  Associated symptoms include dysuria (at times)  Pertinent negatives include no abdominal pain, chills, constipation, diarrhea, discolored urine, fever, headaches, nausea, painful intercourse, rash, sore throat or urgency  The vaginal discharge was white  She has not been passing clots  She has not been passing tissue  Review of Systems   Review of Systems   Constitutional: Negative for chills, diaphoresis, fatigue and fever  HENT: Negative for congestion, ear discharge, ear pain, facial swelling and sore throat  Eyes: Negative for photophobia, pain, discharge, redness, itching and visual disturbance  Respiratory: Negative for apnea, cough, chest tightness, shortness of breath and wheezing  Cardiovascular: Negative for chest pain and palpitations  Gastrointestinal: Negative for abdominal pain, constipation, diarrhea and nausea  Genitourinary: Positive for dysuria (at times) and vaginal discharge  Negative for pelvic pain and urgency  Skin: Negative for color change, rash and wound  Neurological: Negative for dizziness and headaches  Hematological: Negative for adenopathy           Current Medications     Long-Term Medications   Medication Sig Dispense Refill    amphetamine-dextroamphetamine (ADDERALL XR) 25 MG 24 hr capsule Take 1 capsule (25 mg total) by mouth every morningMax Daily Amount: 25 mg 30 capsule 0    levothyroxine 50 mcg tablet Take 1 tablet (50 mcg total) by mouth daily 90 tablet 1    norethindrone-ethinyl estradiol-iron (MICROGESTIN FE1 5/30) 1 5-30 MG-MCG tablet Take 1 tablet by mouth daily 28 tablet 11       Current Allergies     Allergies as of 08/12/2021    (No Known Allergies)            The following portions of the patient's history were reviewed and updated as appropriate: allergies, current medications, past family history, past medical history, past social history, past surgical history and problem list   Past Medical History:   Diagnosis Date    ADHD (attention deficit hyperactivity disorder)     Hypothyroid     Morbid obesity with BMI of 45 0-49 9, adult ([facility] )      Past Surgical History:   Procedure Laterality Date    FRACTURE SURGERY      WRIST SURGERY Right      Social History     Socioeconomic History    Marital status: Single     Spouse name: Not on file    Number of children: Not on file    Years of education: Not on file    Highest education level: Not on file   Occupational History    Not on file   Tobacco Use    Smoking status: Never Smoker    Smokeless tobacco: Never Used   Vaping Use    Vaping Use: Some days    Substances: Nicotine, Flavoring   Substance and Sexual Activity    Alcohol use: Yes     Comment: socially    Drug use: Never    Sexual activity: Yes     Partners: Male     Birth control/protection: OCP   Other Topics Concern    Not on file   Social History Narrative    Not on file     Social Determinants of Health     Financial Resource Strain:     Difficulty of Paying Living Expenses:    Food Insecurity:     Worried About Running Out of Food in the Last Year:     Ran Out of Food in the Last Year:    Transportation Needs: No Transportation Needs    Lack of Transportation (Medical): No    Lack of Transportation (Non-Medical):  No   Physical Activity:     Days of Exercise per Week:     Minutes of Exercise per Session:    Stress:     Feeling of Stress :    Social Connections:     Frequency of Communication with Friends and Family:     Frequency of Social Gatherings with Friends and Family:     Attends Sabianism Services:     Active Member of Clubs or Organizations:     Attends Club or Organization Meetings:     Marital Status:    Intimate Partner Violence:     Fear of Current or Ex-Partner:     Emotionally Abused:     Physically Abused:     Sexually Abused:        Objective   /100   Pulse 98 Temp 98 2 °F (36 8 °C)   Resp 18   Ht 5' 8" (1 727 m)   Wt 133 kg (294 lb)   SpO2 98%   BMI 44 70 kg/m²    LMP unsure  Physical Exam     Physical Exam  Vitals and nursing note reviewed  Constitutional:       General: She is not in acute distress  Appearance: She is well-developed  She is not diaphoretic  HENT:      Head: Normocephalic and atraumatic  Right Ear: External ear normal       Left Ear: External ear normal       Nose: Nose normal    Eyes:      General: No scleral icterus  Right eye: No discharge  Left eye: No discharge  Conjunctiva/sclera: Conjunctivae normal    Cardiovascular:      Rate and Rhythm: Normal rate and regular rhythm  Heart sounds: Normal heart sounds  No murmur heard  No friction rub  No gallop  Pulmonary:      Effort: Pulmonary effort is normal  No respiratory distress  Breath sounds: Normal breath sounds  No decreased breath sounds, wheezing, rhonchi or rales  Abdominal:      General: Bowel sounds are normal       Palpations: Abdomen is soft  Tenderness: There is no abdominal tenderness  There is no right CVA tenderness, left CVA tenderness, guarding or rebound  Skin:     General: Skin is warm and dry  Coloration: Skin is not pale  Findings: No erythema or rash  Neurological:      Mental Status: She is alert and oriented to person, place, and time  Psychiatric:         Behavior: Behavior normal          Thought Content:  Thought content normal          Judgment: Judgment normal          Mayte Spears PA-C

## 2021-08-12 NOTE — PATIENT INSTRUCTIONS
Yeast Infection   WHAT YOU NEED TO KNOW:   A yeast infection, or vaginal candidiasis, is a common vaginal infection  A yeast infection is caused by a fungus, or yeast-like germ  Fungi are normally found in your vagina  Too many fungi can cause an infection  DISCHARGE INSTRUCTIONS:   Call your doctor or gynecologist if:   · You have a fever and chills  · You develop abdominal or pelvic pain  · Your discharge is bloody and it is not your monthly period  · Your signs and symptoms get worse, even after treatment  · You have questions or concerns about your condition or care  Medicines:   · Medicines  help treat the fungal infection and decrease inflammation  The medicine may be a pill, cream, ointment, or vaginal tablet or suppository  · Take your medicine as directed  Contact your healthcare provider if you think your medicine is not helping or if you have side effects  Tell him of her if you are allergic to any medicine  Keep a list of the medicines, vitamins, and herbs you take  Include the amounts, and when and why you take them  Bring the list or the pill bottles to follow-up visits  Carry your medicine list with you in case of an emergency  Keep your vagina healthy:   · Clean your genital area with mild soap and warm water each day  Do not get soap inside your vagina  Gently dry the area after washing  Do not use hot tubs  The heat and moisture from hot tubs can increase your risk for another yeast infection  · Always wipe from front to back  after you use the toilet  This prevents spreading bacteria from your rectal area into your vagina  · Do not wear tight-fitting clothes or undergarments  for long periods of time  Wear cotton underwear during the day  Cotton helps keep your genital area dry and does not hold in warmth or moisture  Do not wear underwear at night  · Do not douche  or use feminine hygiene sprays or bubble bath   Do not use pads or tampons that are scented, or colored or perfumed toilet paper  · Do not have sex until your symptoms go away  Have your partner wear a condom until you complete your course of medication  · Ask your healthcare provider about birth control options if necessary  Condoms have latex and diaphragms have gel that kills sperm  Both of these may irritate your genital area  Follow up with your doctor or gynecologist as directed:  Write down your questions so you remember to ask them during your visits  © Copyright [employer] 2021 Information is for End User's use only and may not be sold, redistributed or otherwise used for commercial purposes  All illustrations and images included in CareNotes® are the copyrighted property of [employer] M , Inc  or [address] [place], [place] [address]   The above information is an  only  It is not intended as medical advice for individual conditions or treatments  Talk to your doctor, nurse or pharmacist before following any medical regimen to see if it is safe and effective for you

## 2021-08-13 ENCOUNTER — ANESTHESIA EVENT (OUTPATIENT)
Dept: GASTROENTEROLOGY | Facility: HOSPITAL | Age: 21
End: 2021-08-13

## 2021-08-13 ENCOUNTER — ANESTHESIA (OUTPATIENT)
Dept: GASTROENTEROLOGY | Facility: HOSPITAL | Age: 21
End: 2021-08-13

## 2021-08-13 ENCOUNTER — HOSPITAL ENCOUNTER (OUTPATIENT)
Dept: GASTROENTEROLOGY | Facility: HOSPITAL | Age: 21
Setting detail: OUTPATIENT SURGERY
Discharge: HOME | End: 2021-08-13
Attending: SURGERY
Payer: COMMERCIAL

## 2021-08-13 VITALS
TEMPERATURE: 97.5 F | HEART RATE: 73 BPM | WEIGHT: 293 LBS | RESPIRATION RATE: 20 BRPM | BODY MASS INDEX: 45.12 KG/M2 | OXYGEN SATURATION: 99 % | DIASTOLIC BLOOD PRESSURE: 72 MMHG | SYSTOLIC BLOOD PRESSURE: 151 MMHG

## 2021-08-13 DIAGNOSIS — E66.01 MORBID OBESITY (HCC): ICD-10-CM

## 2021-08-13 PROCEDURE — 43239 EGD BIOPSY SINGLE/MULTIPLE: CPT | Performed by: SURGERY

## 2021-08-13 PROCEDURE — 88305 TISSUE EXAM BY PATHOLOGIST: CPT | Performed by: PATHOLOGY

## 2021-08-13 RX ORDER — SODIUM CHLORIDE, SODIUM LACTATE, POTASSIUM CHLORIDE, CALCIUM CHLORIDE 600; 310; 30; 20 MG/100ML; MG/100ML; MG/100ML; MG/100ML
INJECTION, SOLUTION INTRAVENOUS CONTINUOUS PRN
Status: DISCONTINUED | OUTPATIENT
Start: 2021-08-13 | End: 2021-08-13

## 2021-08-13 RX ORDER — PROPOFOL 10 MG/ML
INJECTION, EMULSION INTRAVENOUS AS NEEDED
Status: DISCONTINUED | OUTPATIENT
Start: 2021-08-13 | End: 2021-08-13

## 2021-08-13 RX ADMIN — PROPOFOL 50 MG: 10 INJECTION, EMULSION INTRAVENOUS at 09:05

## 2021-08-13 RX ADMIN — PROPOFOL 100 MG: 10 INJECTION, EMULSION INTRAVENOUS at 09:04

## 2021-08-13 RX ADMIN — PROPOFOL 100 MG: 10 INJECTION, EMULSION INTRAVENOUS at 09:02

## 2021-08-13 RX ADMIN — SODIUM CHLORIDE, SODIUM LACTATE, POTASSIUM CHLORIDE, AND CALCIUM CHLORIDE: .6; .31; .03; .02 INJECTION, SOLUTION INTRAVENOUS at 09:02

## 2021-08-13 NOTE — H&P
This is a 24 y o  female with a history of morbid obesity and Body mass index is 45 12 kg/m²  Here for an EGD to evaluate the anatomy of the GI tract and to rule out the presence of H  pylori  Physical Exam    /94   Pulse 82   Temp 97 7 °F (36 5 °C) (Temporal)   Resp 16   Wt 135 kg (296 lb 11 8 oz)   LMP 07/28/2021   SpO2 98%   BMI 45 12 kg/m²    AAOx3  RRR  CTA B  Abdomen obese  Benign  A/P:    This is a 24 y o  female with a history of morbid obesity and Body mass index is 45 12 kg/m²       Will proceed with the EGD and biopsies        Alyssia Hawkins MD  8/13/2021  8:44 AM

## 2021-08-13 NOTE — ANESTHESIA PREPROCEDURE EVALUATION
Procedure:  EGD    Relevant Problems   ENDO   (+) Acquired hypothyroidism      Other   (+) ADHD (attention deficit hyperactivity disorder)   (+) Morbid obesity with BMI of 45 0-49 9, adult [facility])        Physical Exam    Airway    Mallampati score: I  TM Distance: >3 FB  Neck ROM: full     Dental       Cardiovascular  Cardiovascular exam normal    Pulmonary  Pulmonary exam normal     Other Findings        Anesthesia Plan  ASA Score- 3     Anesthesia Type- IV sedation with anesthesia with ASA Monitors  Additional Monitors:   Airway Plan:           Plan Factors-Exercise tolerance (METS): >4 METS  Chart reviewed  EKG reviewed  Imaging results reviewed  Existing labs reviewed  Patient summary reviewed  Induction- intravenous  Postoperative Plan-     Informed Consent- Anesthetic plan and risks discussed with patient  I personally reviewed this patient with the CRNA  Discussed and agreed on the Anesthesia Plan with the CRNA  Jose Alberto Krishnamurthy

## 2021-08-15 ENCOUNTER — HOSPITAL ENCOUNTER (OUTPATIENT)
Dept: SLEEP CENTER | Facility: HOSPITAL | Age: 21
Discharge: HOME | End: 2021-08-15
Attending: PSYCHIATRY & NEUROLOGY
Payer: COMMERCIAL

## 2021-08-15 DIAGNOSIS — Z91.89 AT RISK FOR SLEEP APNEA: ICD-10-CM

## 2021-08-15 DIAGNOSIS — R06.83 SNORING: ICD-10-CM

## 2021-08-15 DIAGNOSIS — F90.8 ATTENTION DEFICIT HYPERACTIVITY DISORDER (ADHD), OTHER TYPE: ICD-10-CM

## 2021-08-15 DIAGNOSIS — E66.01 MORBID OBESITY WITH BMI OF 45.0-49.9, ADULT (HCC): ICD-10-CM

## 2021-08-15 PROCEDURE — G0399 HOME SLEEP TEST/TYPE 3 PORTA: HCPCS

## 2021-08-16 NOTE — PROGRESS NOTES
Home Sleep Study Documentation    Pre-Sleep Home Study:    Set-up and instructions performed by: NAILA Ramesh    Technician performed demonstration for Patient: yes    Return demonstration performed by Patient: yes    Written instructions provided to Patient: yes    Patient signed consent form: yes        Post-Sleep Home Study:    Additional comments by Patient: N/A    Home Sleep Study Failed: NO    Failure reason: N/A    Reported or Detected: N/A    Scored by: NAILA Ramesh

## 2021-08-17 PROCEDURE — 95806 SLEEP STUDY UNATT&RESP EFFT: CPT | Performed by: PSYCHIATRY & NEUROLOGY

## 2021-08-18 ENCOUNTER — TELEPHONE (OUTPATIENT)
Dept: SLEEP CENTER | Facility: CLINIC | Age: 21
End: 2021-08-18

## 2021-08-18 NOTE — TELEPHONE ENCOUNTER
----- Message from Calderon Dugan MD sent at 8/18/2021 10:11 AM EDT -----  HST read  Just qualified for BRUCE with an GARFIELD of 5 1  CPAP vs  OAT vs  Weight loss would be appropriate

## 2021-08-20 ENCOUNTER — OFFICE VISIT (OUTPATIENT)
Dept: BARIATRICS | Facility: CLINIC | Age: 21
End: 2021-08-20

## 2021-08-20 DIAGNOSIS — E66.01 MORBID (SEVERE) OBESITY DUE TO EXCESS CALORIES (HCC): ICD-10-CM

## 2021-08-20 DIAGNOSIS — Z01.818 PREOP TESTING: Primary | ICD-10-CM

## 2021-08-20 PROCEDURE — RECHECK

## 2021-08-20 NOTE — PROGRESS NOTES
Bariatric Nutrition  Note  TELEVISIT per pt request - at work    Insurance: No weight checks required    Type of surgery    Interested in Gastric bypass: laparoscopic vs Sleeve but very unsure   Surgery Date: TBD  Surgeon: Dr Milan Wilson on 5/28/2021    Nutrition Assessment   Sabiha Peña  24 y o   female     Height: 5'7 5"  Weight: N/A - televisit  Eval Weight: 296 6#   BMI: 45 8  Wt with BMI of 25: 162#  Pre-Op Excess Wt: 134 6#  BMI to Qualify at 39 = 259#  BMI to Qualify at 34 = 227#  PMH includes: ADHD, hypothyroidism, GERD, BRUCE suspected    Pt advised not to gain weight during preop process  Pt encouraged to lose weight via healthy eating and exercise  Pt may follow Liver Shrinking diet 2 weeks prior to DOS depending on BMI at time  This diet will promote weight loss  Last menstrual period 07/28/2021, not currently breastfeeding      Weight History  Reason for WLS: Has thyroid issues   Onset of Obesity: Childhood  Family history of obesity: Yes Grandfather had surgery   Wt Loss Attempts: Exercise  Self Created Diets (Portion Control, Healthy Food Choices, etc ) - quit drinking soda  Maximum Wt Lost: 5-10# fluctuates    Review of History and Medications   OTC: None  Past Medical History:   Diagnosis Date    ADHD (attention deficit hyperactivity disorder)     Disease of thyroid gland     Hypothyroid     Morbid obesity with BMI of 45 0-49 9, adult (HCC)      Past Surgical History:   Procedure Laterality Date    FRACTURE SURGERY Right     WRIST SURGERY Right      Social History     Socioeconomic History    Marital status: Single     Spouse name: Not on file    Number of children: Not on file    Years of education: Not on file    Highest education level: Not on file   Occupational History    Not on file   Tobacco Use    Smoking status: Never Smoker    Smokeless tobacco: Never Used   Vaping Use    Vaping Use: Some days    Substances: Nicotine, Flavoring   Substance and Sexual Activity    Alcohol use: Yes     Comment: socially    Drug use: Never    Sexual activity: Yes     Partners: Male     Birth control/protection: OCP   Other Topics Concern    Not on file   Social History Narrative    Not on file     Social Determinants of Health     Financial Resource Strain:     Difficulty of Paying Living Expenses:    Food Insecurity:     Worried About Running Out of Food in the Last Year:     [address] in the Last Year:    Transportation Needs: No Transportation Needs    Lack of Transportation (Medical): No    Lack of Transportation (Non-Medical):  No   Physical Activity:     Days of Exercise per Week:     Minutes of Exercise per Session:    Stress:     Feeling of Stress :    Social Connections:     Frequency of Communication with Friends and Family:     Frequency of Social Gatherings with Friends and Family:     Attends Worship Services:     Active Member of Clubs or Organizations:     Attends Club or Organization Meetings:     Marital Status:    Intimate Partner Violence:     Fear of Current or Ex-Partner:     Emotionally Abused:     Physically Abused:     Sexually Abused:        Current Outpatient Medications:     amphetamine-dextroamphetamine (ADDERALL XR) 25 MG 24 hr capsule, Take 1 capsule (25 mg total) by mouth every morningMax Daily Amount: 25 mg, Disp: 30 capsule, Rfl: 0    levothyroxine 50 mcg tablet, Take 1 tablet (50 mcg total) by mouth daily, Disp: 90 tablet, Rfl: 1    norethindrone-ethinyl estradiol-iron (MICROGESTIN FE1 5/30) 1 5-30 MG-MCG tablet, Take 1 tablet by mouth daily, Disp: 28 tablet, Rfl: 11     Food Intake and Lifestyle Assessment   Food Intake Assessment completed via usual diet recall  Breakfast: Skips Takes thyroid pill and adderall (not hungry)  Lunch: SW and PB and jelly crackers  Dinner: Boyfriends parents - protein only , maybe salad - no starch  Snacks: minimal snacking  Beverage intake: water  Protein supplement: None  Estimated protein intake per day: 75-80grams  Estimated fluid intake per day: 16 oz Water   Meals eaten away from home: Fast food maybe 1X week  Typical meal pattern: 2 meals per day and 0-1 snacks per day  Eating Behaviors: Consumption of high calorie/ high fat foods, Large portion sizes and Emotional eating  Craves chicken  Not mindful of how much eats in a day  Food allergies or intolerances: No Known Allergies or intolerances  Cultural or Congregation considerations: None    Physical Assessment  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  Types of exercise: Plays volleyball or other activity on w/e  Current physical limitations: None    Psychosocial Assessment   Support systems: significant other relative(s)  Socioeconomic factors: None    Nutrition Diagnosis  Diagnosis: Overweight / Obesity (NC-3 3)  Related to: Physical inactivity and Excessive energy intake  As Evidenced by: BMI >25     Nutrition Prescription: Recommend the following diet  Low fat, Low sugar, High protein and Regular    Interventions and Teaching   Discussed pre-op and post-op nutrition guidelines  Patient educated and handouts provided    Surgical changes to stomach / GI  Capacity of post-surgery stomach  Diet progression  Adequate hydration  Sugar and fat restriction to decrease "dumping syndrome"  Fat restriction to decrease steatorrhea  Expected weight loss  Weight loss plateaus/ possibility of weight regain  Exercise  Suggestions for pre-op diet  Nutrition considerations after surgery  Protein supplements  Meal planning and preparation  Appropriate carbohydrate, protein, and fat intake, and food/fluid choices to maximize safe weight loss, nutrient intake, and tolerance   Dietary and lifestyle changes  Possible problems with poor eating habits  Intuitive eating  Techniques for self monitoring and keeping daily food journal  Potential for food intolerance after surgery, and ways to deal with them including: lactose intolerance, nausea, reflux, vomiting, diarrhea, food intolerance, appetite changes, gas  Vitamin / Mineral supplementation of Multivitamin with minerals, Calcium, Vitamin B12, Iron and Vitamin D    Education provided to: patient    Barriers to learning: No barriers identified  Readiness to change: preparation    Prior research on procedure: discussed with provider and friends or family    Comprehension: verbalizes understanding     Expected Compliance: good  Recommendations  Pt is an appropriate candidate for surgery  Yes    Evaluation / Monitoring  Dietitian to Monitor: Eating pattern as discussed Tolerance of nutrition prescription Body weight Lab values Physical activity Bowel pattern    Visit Summary:  Making changes but reports to be struggling  Still eating out but selecting salads with chicken  Trying to eat 3 meals but sometimes can't with work  To use protein drinks as meal replacement  To try food logging  Walking more as able - has new puppy  Having difficulty with quitting smoking  Will have to stop next week as having wisdom teeth removed and hopes that will be be the push she needs    Working on other pre-surgery guidelines      Workflow: Needs cardiology (9/24), f/u with pulmonary and complete labs and nicotine test  Pt aware she needs to quit for 30 days before test     Goals  Food journal, Exercise 30 minutes 5 times per week, Complete lession plans 1-6, Eat 3 meals per day and Eliminate mindless snacking   Follow Pre-Surgery guidelines  > Trial Baritastic for food logging  > Establish regular meal pattern - include fruits, vegetables and whole grains  > No skipping meals-  Include breakfast meal   > Focus on protein - include lean protein at each meal and snack - Can use protein drink as meal replacement  > Decrease portions  > Limit processed foods, fast foods and dining away from home  > Limit snacks - healthier choices and portion; avoid grazing  > Slow pace of eating and drinking - practice 30/60 minute rule  >

## 2021-08-23 DIAGNOSIS — F90.0 ATTENTION DEFICIT HYPERACTIVITY DISORDER (ADHD), PREDOMINANTLY INATTENTIVE TYPE: ICD-10-CM

## 2021-08-23 NOTE — TELEPHONE ENCOUNTER
Discussed with the patient sleep study results and Dr Carmel Coker recommendations  Eliana Whitehead will discuss with Bariatrics and check what she needs to have surgery done   Patient will call back

## 2021-08-24 RX ORDER — DEXTROAMPHETAMINE SACCHARATE, AMPHETAMINE ASPARTATE MONOHYDRATE, DEXTROAMPHETAMINE SULFATE AND AMPHETAMINE SULFATE 6.25; 6.25; 6.25; 6.25 MG/1; MG/1; MG/1; MG/1
25 CAPSULE, EXTENDED RELEASE ORAL EVERY MORNING
Qty: 30 CAPSULE | Refills: 0 | Status: SHIPPED | OUTPATIENT
Start: 2021-08-24 | End: 2021-10-08 | Stop reason: SDUPTHER

## 2021-08-24 NOTE — TELEPHONE ENCOUNTER
Requested medication(s) are due for refill today: Yes  Patient has already received a courtesy refill: No  Other reason request has been forwarded to provider: Can not be delegated

## 2021-09-03 ENCOUNTER — TELEPHONE (OUTPATIENT)
Dept: FAMILY MEDICINE CLINIC | Facility: CLINIC | Age: 21
End: 2021-09-03

## 2021-09-03 DIAGNOSIS — E03.9 ACQUIRED HYPOTHYROIDISM: ICD-10-CM

## 2021-09-03 RX ORDER — LEVOTHYROXINE SODIUM 0.05 MG/1
50 TABLET ORAL DAILY
Qty: 90 TABLET | Refills: 1 | Status: SHIPPED | OUTPATIENT
Start: 2021-09-03 | End: 2022-02-08 | Stop reason: SDUPTHER

## 2021-09-23 ENCOUNTER — TELEPHONE (OUTPATIENT)
Dept: CARDIOLOGY CLINIC | Facility: CLINIC | Age: 21
End: 2021-09-23

## 2021-09-24 ENCOUNTER — CONSULT (OUTPATIENT)
Dept: CARDIOLOGY CLINIC | Facility: CLINIC | Age: 21
End: 2021-09-24
Payer: COMMERCIAL

## 2021-09-24 VITALS
BODY MASS INDEX: 44.41 KG/M2 | OXYGEN SATURATION: 99 % | HEIGHT: 68 IN | HEART RATE: 111 BPM | WEIGHT: 293 LBS | SYSTOLIC BLOOD PRESSURE: 120 MMHG | DIASTOLIC BLOOD PRESSURE: 84 MMHG | RESPIRATION RATE: 18 BRPM

## 2021-09-24 DIAGNOSIS — Z01.810 PREOP CARDIOVASCULAR EXAM: ICD-10-CM

## 2021-09-24 DIAGNOSIS — G47.33 OSA (OBSTRUCTIVE SLEEP APNEA): Primary | ICD-10-CM

## 2021-09-24 DIAGNOSIS — Z98.84 BARIATRIC SURGERY STATUS: ICD-10-CM

## 2021-09-24 PROCEDURE — 93000 ELECTROCARDIOGRAM COMPLETE: CPT | Performed by: INTERNAL MEDICINE

## 2021-09-24 PROCEDURE — 1036F TOBACCO NON-USER: CPT | Performed by: INTERNAL MEDICINE

## 2021-09-24 PROCEDURE — 3008F BODY MASS INDEX DOCD: CPT | Performed by: INTERNAL MEDICINE

## 2021-09-24 PROCEDURE — 99204 OFFICE O/P NEW MOD 45 MIN: CPT | Performed by: INTERNAL MEDICINE

## 2021-09-24 NOTE — PROGRESS NOTES
OP Consultation - Cardiology    Krystal Douglas 24 y o  female MRN: [id number]    Physician Requesting Consult: Marquita Fagan MD    Reason for Consult / Chief Complaint: Preop risk assessment    HPI:     24year old woman with no significant past medical history who presents for preop risk assessment prior to bariatric surgery    Denies chest pain, chest pressure, shortness of breath, dizziness, lightheadedness, presyncope or syncope  Denies orthopnea, PND or lower limb edema  Has two flight of stairs at home  Can walk up and down with no complaints  Social History:  Tobacco: Vapes, no cigarrettes   Alcohol: Socially    Works at [employer] of a [employer] office  FAMILY HISTORY:  Family History   Problem Relation Age of Onset    Heart disease Paternal Grandfather     Diabetes Paternal Grandfather     Lung cancer Paternal Grandfather     Diabetes Sister         prediabetes    Thyroid disease Paternal Grandmother     Stroke Neg Hx         MEDS & ALLERGIES:  No Known Allergies      Current Outpatient Medications:     amphetamine-dextroamphetamine (ADDERALL XR) 25 MG 24 hr capsule, Take 1 capsule (25 mg total) by mouth every morningMax Daily Amount: 25 mg, Disp: 30 capsule, Rfl: 0    levothyroxine 50 mcg tablet, Take 1 tablet (50 mcg total) by mouth daily, Disp: 90 tablet, Rfl: 1    norethindrone-ethinyl estradiol-iron (MICROGESTIN FE1 5/30) 1 5-30 MG-MCG tablet, Take 1 tablet by mouth daily, Disp: 28 tablet, Rfl: 11    Past Medical History:   Diagnosis Date    ADHD (attention deficit hyperactivity disorder)     Disease of thyroid gland     Hypothyroid     Morbid obesity with BMI of 45 0-49 9, adult ([facility])          Review of Systems:  Review of Systems   Constitutional: Negative  Negative for chills, fatigue and fever  HENT: Negative for nosebleeds  Respiratory: Negative for chest tightness, shortness of breath and stridor      Cardiovascular: Negative for chest pain, palpitations and leg swelling  Gastrointestinal: Negative for abdominal pain, nausea and vomiting  Genitourinary: Negative for dysuria  Musculoskeletal: Negative for back pain  Neurological: Negative for dizziness, syncope and weakness  Hematological: Negative for adenopathy  All other systems reviewed and are negative  PHYSICAL EXAM:  Vitals:   Vitals:    09/24/21 1546   BP: 120/84   BP Location: Left arm   Patient Position: Sitting   Cuff Size: Large   Pulse: (!) 111   Resp: 18   SpO2: 99%   Weight: 134 kg (295 lb)   Height: 5' 8" (1 727 m)        Physical Exam:  Physical Exam    LABORATORY RESULTS:    Lab Results   Component Value Date    WBC 7 29 11/23/2020    HGB 13 8 11/23/2020    HCT 42 4 11/23/2020    MCV 92 11/23/2020     11/23/2020     Lab Results   Component Value Date    CALCIUM 9 1 11/23/2020    K 4 1 11/23/2020    CO2 24 11/23/2020     11/23/2020    BUN 13 11/23/2020    CREATININE 0 79 11/23/2020     Lab Results   Component Value Date    HGBA1C 4 9 11/23/2020       Lipid Profile:   No results found for: CHOL  Lab Results   Component Value Date    HDL 68 11/23/2020     Lab Results   Component Value Date    LDLCALC 98 11/23/2020     Lab Results   Component Value Date    TRIG 129 11/23/2020       The ASCVD Risk score (Rosa Morris et al , 2013) failed to calculate for the following reasons: The 2013 ASCVD risk score is only valid for ages 36 to 78    Imaging: I have personally reviewed pertinent reports  No results found  EKG reviewed personally: NSR    Assessment and Plan:    Melita Jorgensen was seen today for follow-up  Diagnoses and all orders for this visit:    Bariatric surgery status  -     Ambulatory referral to Cardiology  -     POCT ECG      1  Preop risk assessment  2  Bariatric Surgery    Patient presents for preoperative cardiovascular risk assessment  Patient  is scheduled for bariatric surgery  Activity level: Patient is relatively active   She is able to complete greater than 4 METS of activity  There is no sign or symptoms of acute coronary syndrome, acute heart failure, valvular heart disease or uncontrolled arrhythmias  Patient is at low risk of perioperative cardiovascular complications based on his age, comorbidities, activity level and nature of upcoming surgery  No further work up would meaningfully change his perioperative risk            Return to clinic ALO Brown MD  2/20/6664,9:93 PM

## 2021-09-29 ENCOUNTER — TELEMEDICINE (OUTPATIENT)
Dept: FAMILY MEDICINE CLINIC | Facility: CLINIC | Age: 21
End: 2021-09-29
Payer: COMMERCIAL

## 2021-09-29 DIAGNOSIS — B34.9 VIRAL INFECTION, UNSPECIFIED: ICD-10-CM

## 2021-09-29 DIAGNOSIS — Z03.818 ENCOUNTER FOR OBSERVATION FOR SUSPECTED EXPOSURE TO OTHER BIOLOGICAL AGENTS RULED OUT: ICD-10-CM

## 2021-09-29 PROCEDURE — U0005 INFEC AGEN DETEC AMPLI PROBE: HCPCS | Performed by: PHYSICIAN ASSISTANT

## 2021-09-29 PROCEDURE — 99213 OFFICE O/P EST LOW 20 MIN: CPT | Performed by: PHYSICIAN ASSISTANT

## 2021-09-29 PROCEDURE — U0003 INFECTIOUS AGENT DETECTION BY NUCLEIC ACID (DNA OR RNA); SEVERE ACUTE RESPIRATORY SYNDROME CORONAVIRUS 2 (SARS-COV-2) (CORONAVIRUS DISEASE [COVID-19]), AMPLIFIED PROBE TECHNIQUE, MAKING USE OF HIGH THROUGHPUT TECHNOLOGIES AS DESCRIBED BY CMS-2020-01-R: HCPCS | Performed by: PHYSICIAN ASSISTANT

## 2021-09-29 NOTE — PROGRESS NOTES
COVID-19 Outpatient Progress Note    Assessment/Plan:    Problem List Items Addressed This Visit     None      Visit Diagnoses     Encounter for observation for suspected exposure to other biological agents ruled out        Relevant Orders    Novel Coronavirus (Covid-19),PCR [facility] - Collected in Office    Viral infection, unspecified        Relevant Orders    Novel Coronavirus (Covid-19),PCR [facility] - Collected in Office         Disposition:     I recommended patient come to the office for further evaluation  Isolate until results return  Supportive care - tylennol, motrin, vit c/d  We did discuss monoclonal antibody as treatment option if COVID positive but did not agree to it at this time  I have spent 12 minutes directly with the patient  Verification of patient location:    Patient is located in the following state in which I hold an active license [place]    Encounter provider Belen Nicholson PA-C    Provider located at [address] [place] [id number]    Recent Visits  No visits were found meeting these conditions  Showing recent visits within past 7 days and meeting all other requirements  Today's Visits  Date Type Provider Dept   09/29/21 Telemedicine Belen Nicholson PA-C [facility] At [address] [place] [address] today's visits and meeting all other requirements  Future Appointments  No visits were found meeting these conditions  Showing future appointments within next 150 days and meeting all other requirements     This virtual check-in was done via [employer] and patient was informed that this is a secure, HIPAA-compliant platform  She agrees to proceed  Patient agrees to participate in a virtual check in via telephone or video visit instead of presenting to the office to address urgent/immediate medical needs  Patient is aware this is a billable service      After connecting through [facility], the patient was identified by name and date of birth  Shayy Portillo was informed that this was a telemedicine visit and that the exam was being conducted confidentially over secure lines  My office door was closed  No one else was in the room  Shayy Portillo acknowledged consent and understanding of privacy and security of the telemedicine visit  I informed the patient that I have reviewed her record in Epic and presented the opportunity for her to ask any questions regarding the visit today  The patient agreed to participate  Subjective:   Shayy Portillo is a 24 y o  female who is concerned about COVID-19  Patient's symptoms include fatigue, nasal congestion, rhinorrhea, sore throat (voice is scratchy), anosmia, loss of taste, cough, shortness of breath ("a little bit" when walking up stairs), diarrhea (today), myalgias (tuesday, still sore in back today) and headache (worse on monday)  Patient denies fever, chest tightness, nausea and vomiting  Date of symptom onset: 9/26/2021  COVID-19 vaccination status: Partially vaccinated    Exposure:   Contact with a person who is under investigation (PUI) for or who is positive for COVID-19 within the last 14 days?: Yes    Hospitalized recently for fever and/or lower respiratory symptoms?: No      Currently a healthcare worker that is involved in direct patient care?: Yes      Works in a special setting where the risk of COVID-19 transmission may be high? (this may include long-term care, correctional and [facility] facilities; homeless shelters; assisted-living facilities and group homes ): Yes      Resident in a special setting where the risk of COVID-19 transmission may be high? (this may include long-term care, correctional and [facility] facilities; homeless shelters; assisted-living facilities and group homes ): No      She stated with congestion on Sunday  On Monday, she started with loss of taste and smell and other symptoms  She got her first covid vaccine, pfizer, on sept 20th       A coworker started with similar symptoms, tested yesterday  She works in dental office  She just a job working in [employer] in [place]  Lab Results   Component Value Date    SARSCOV2 Negative 02/08/2021     Past Medical History:   Diagnosis Date    ADHD (attention deficit hyperactivity disorder)     Disease of thyroid gland     Hypothyroid     Morbid obesity with BMI of 45 0-49 9, adult (HCC)      Past Surgical History:   Procedure Laterality Date    FRACTURE SURGERY Right     WRIST SURGERY Right      Current Outpatient Medications   Medication Sig Dispense Refill    amphetamine-dextroamphetamine (ADDERALL XR) 25 MG 24 hr capsule Take 1 capsule (25 mg total) by mouth every morningMax Daily Amount: 25 mg 30 capsule 0    levothyroxine 50 mcg tablet Take 1 tablet (50 mcg total) by mouth daily 90 tablet 1    norethindrone-ethinyl estradiol-iron (MICROGESTIN FE1 5/30) 1 5-30 MG-MCG tablet Take 1 tablet by mouth daily 28 tablet 11     No current facility-administered medications for this visit  No Known Allergies    Review of Systems   Constitutional: Positive for fatigue  Negative for fever  HENT: Positive for congestion, rhinorrhea and sore throat (voice is scratchy)  Respiratory: Positive for cough and shortness of breath ("a little bit" when walking up stairs)  Negative for chest tightness  Gastrointestinal: Positive for diarrhea (today)  Negative for nausea and vomiting  Musculoskeletal: Positive for myalgias (tuesday, still sore in back today)  Neurological: Positive for headaches (worse on monday)  Objective: There were no vitals filed for this visit  Physical Exam  Constitutional:       General: She is not in acute distress  Appearance: Normal appearance  HENT:      Head: Normocephalic and atraumatic  Right Ear: External ear normal       Left Ear: External ear normal       Nose: Congestion present        Mouth/Throat:      Mouth: Mucous membranes are moist  Comments: Raspy voice  Eyes:      Conjunctiva/sclera: Conjunctivae normal    Pulmonary:      Effort: No respiratory distress  Skin:     General: Skin is dry  Neurological:      General: No focal deficit present  Mental Status: She is alert  Psychiatric:         Mood and Affect: Mood normal          VIRTUAL VISIT DISCLAIMER    Gilbert Emerson verbally agrees to participate in [employer]  Pt is aware that [employer] could be limited without vital signs or the ability to perform a full hands-on physical exam  Allegra Haynes understands she or the provider may request at any time to terminate the video visit and request the patient to seek care or treatment in person

## 2021-09-29 NOTE — LETTER
September 29, 2021     Patient: Kvng Jaimes   YOB: 2000   Date of Visit: 9/29/2021       To Whom it May Concern:    Kvng Jaimes is under my professional care  She was seen via telemedicine on 9/29/2021  She is to remain out of work as she is being tested for [place]  She is to remain out of work until results return  If you have any questions or concerns, please don't hesitate to call  Sincerely,          [email or web address] com  com

## 2021-09-30 LAB — SARS-COV-2 RNA RESP QL NAA+PROBE: POSITIVE

## 2021-10-01 ENCOUNTER — OFFICE VISIT (OUTPATIENT)
Dept: BARIATRICS | Facility: CLINIC | Age: 21
End: 2021-10-01

## 2021-10-01 ENCOUNTER — TELEPHONE (OUTPATIENT)
Dept: BARIATRICS | Facility: CLINIC | Age: 21
End: 2021-10-01

## 2021-10-01 ENCOUNTER — TELEPHONE (OUTPATIENT)
Dept: FAMILY MEDICINE CLINIC | Facility: CLINIC | Age: 21
End: 2021-10-01

## 2021-10-01 DIAGNOSIS — Z71.89 ENCOUNTER FOR PRE-BARIATRIC SURGERY COUNSELING AND EDUCATION: Primary | ICD-10-CM

## 2021-10-01 PROCEDURE — RECHECK

## 2021-10-08 ENCOUNTER — OFFICE VISIT (OUTPATIENT)
Dept: FAMILY MEDICINE CLINIC | Facility: CLINIC | Age: 21
End: 2021-10-08
Payer: COMMERCIAL

## 2021-10-08 VITALS
HEIGHT: 68 IN | TEMPERATURE: 99 F | DIASTOLIC BLOOD PRESSURE: 86 MMHG | BODY MASS INDEX: 43.56 KG/M2 | HEART RATE: 92 BPM | WEIGHT: 287.4 LBS | SYSTOLIC BLOOD PRESSURE: 138 MMHG | OXYGEN SATURATION: 98 %

## 2021-10-08 DIAGNOSIS — F41.0 PANIC ATTACKS: ICD-10-CM

## 2021-10-08 DIAGNOSIS — F90.0 ATTENTION DEFICIT HYPERACTIVITY DISORDER (ADHD), PREDOMINANTLY INATTENTIVE TYPE: ICD-10-CM

## 2021-10-08 DIAGNOSIS — F44.9 DISSOCIATIVE EPISODES: ICD-10-CM

## 2021-10-08 DIAGNOSIS — E03.9 ACQUIRED HYPOTHYROIDISM: Primary | ICD-10-CM

## 2021-10-08 PROCEDURE — 3008F BODY MASS INDEX DOCD: CPT | Performed by: PHYSICIAN ASSISTANT

## 2021-10-08 PROCEDURE — 99214 OFFICE O/P EST MOD 30 MIN: CPT | Performed by: PHYSICIAN ASSISTANT

## 2021-10-08 PROCEDURE — 1036F TOBACCO NON-USER: CPT | Performed by: PHYSICIAN ASSISTANT

## 2021-10-08 RX ORDER — HYDROXYZINE PAMOATE 25 MG/1
25 CAPSULE ORAL 3 TIMES DAILY PRN
Qty: 60 CAPSULE | Refills: 0 | Status: SHIPPED | OUTPATIENT
Start: 2021-10-08

## 2021-10-08 RX ORDER — DEXTROAMPHETAMINE SACCHARATE, AMPHETAMINE ASPARTATE MONOHYDRATE, DEXTROAMPHETAMINE SULFATE AND AMPHETAMINE SULFATE 7.5; 7.5; 7.5; 7.5 MG/1; MG/1; MG/1; MG/1
30 CAPSULE, EXTENDED RELEASE ORAL EVERY MORNING
Qty: 30 CAPSULE | Refills: 0 | Status: SHIPPED | OUTPATIENT
Start: 2021-10-08 | End: 2021-11-14 | Stop reason: SDUPTHER

## 2021-10-22 ENCOUNTER — OFFICE VISIT (OUTPATIENT)
Dept: URGENT CARE | Facility: CLINIC | Age: 21
End: 2021-10-22
Payer: COMMERCIAL

## 2021-10-22 VITALS
BODY MASS INDEX: 43.5 KG/M2 | HEART RATE: 102 BPM | RESPIRATION RATE: 18 BRPM | TEMPERATURE: 98 F | OXYGEN SATURATION: 97 % | HEIGHT: 68 IN | WEIGHT: 287 LBS

## 2021-10-22 DIAGNOSIS — H60.501 ACUTE OTITIS EXTERNA OF RIGHT EAR, UNSPECIFIED TYPE: Primary | ICD-10-CM

## 2021-10-22 PROCEDURE — 99213 OFFICE O/P EST LOW 20 MIN: CPT | Performed by: PHYSICIAN ASSISTANT

## 2021-10-22 RX ORDER — OFLOXACIN 3 MG/ML
10 SOLUTION AURICULAR (OTIC) DAILY
Qty: 5 ML | Refills: 0 | Status: SHIPPED | OUTPATIENT
Start: 2021-10-22 | End: 2021-11-15 | Stop reason: SDUPTHER

## 2021-10-27 ENCOUNTER — TELEPHONE (OUTPATIENT)
Dept: PSYCHIATRY | Facility: CLINIC | Age: 21
End: 2021-10-27

## 2021-11-12 ENCOUNTER — TELEPHONE (OUTPATIENT)
Dept: FAMILY MEDICINE CLINIC | Facility: CLINIC | Age: 21
End: 2021-11-12

## 2021-11-14 DIAGNOSIS — F90.0 ATTENTION DEFICIT HYPERACTIVITY DISORDER (ADHD), PREDOMINANTLY INATTENTIVE TYPE: ICD-10-CM

## 2021-11-15 RX ORDER — DEXTROAMPHETAMINE SACCHARATE, AMPHETAMINE ASPARTATE MONOHYDRATE, DEXTROAMPHETAMINE SULFATE AND AMPHETAMINE SULFATE 7.5; 7.5; 7.5; 7.5 MG/1; MG/1; MG/1; MG/1
30 CAPSULE, EXTENDED RELEASE ORAL EVERY MORNING
Qty: 30 CAPSULE | Refills: 0 | Status: SHIPPED | OUTPATIENT
Start: 2021-11-15 | End: 2022-01-06 | Stop reason: SDUPTHER

## 2021-12-09 ENCOUNTER — OFFICE VISIT (OUTPATIENT)
Dept: BARIATRICS | Facility: CLINIC | Age: 21
End: 2021-12-09

## 2021-12-09 DIAGNOSIS — E66.01 MORBID (SEVERE) OBESITY DUE TO EXCESS CALORIES (HCC): Primary | ICD-10-CM

## 2021-12-09 PROCEDURE — RECHECK

## 2021-12-11 ENCOUNTER — APPOINTMENT (EMERGENCY)
Dept: RADIOLOGY | Facility: HOSPITAL | Age: 21
End: 2021-12-11
Payer: COMMERCIAL

## 2021-12-11 ENCOUNTER — HOSPITAL ENCOUNTER (EMERGENCY)
Facility: HOSPITAL | Age: 21
Discharge: HOME | End: 2021-12-11
Attending: INTERNAL MEDICINE | Admitting: INTERNAL MEDICINE
Payer: COMMERCIAL

## 2021-12-11 VITALS
DIASTOLIC BLOOD PRESSURE: 92 MMHG | BODY MASS INDEX: 43.95 KG/M2 | SYSTOLIC BLOOD PRESSURE: 161 MMHG | WEIGHT: 290 LBS | HEIGHT: 68 IN | RESPIRATION RATE: 20 BRPM | OXYGEN SATURATION: 99 % | HEART RATE: 87 BPM

## 2021-12-11 DIAGNOSIS — M25.561 CHRONIC PAIN OF RIGHT KNEE: Primary | ICD-10-CM

## 2021-12-11 DIAGNOSIS — G89.29 CHRONIC PAIN OF RIGHT KNEE: Primary | ICD-10-CM

## 2021-12-11 PROCEDURE — 96372 THER/PROPH/DIAG INJ SC/IM: CPT

## 2021-12-11 PROCEDURE — 99283 EMERGENCY DEPT VISIT LOW MDM: CPT | Performed by: INTERNAL MEDICINE

## 2021-12-11 PROCEDURE — 73564 X-RAY EXAM KNEE 4 OR MORE: CPT

## 2021-12-11 PROCEDURE — 99283 EMERGENCY DEPT VISIT LOW MDM: CPT

## 2021-12-11 RX ORDER — KETOROLAC TROMETHAMINE 30 MG/ML
30 INJECTION, SOLUTION INTRAMUSCULAR; INTRAVENOUS ONCE
Status: COMPLETED | OUTPATIENT
Start: 2021-12-11 | End: 2021-12-11

## 2021-12-11 RX ORDER — NAPROXEN 500 MG/1
500 TABLET ORAL 2 TIMES DAILY WITH MEALS
Qty: 30 TABLET | Refills: 0 | Status: SHIPPED | OUTPATIENT
Start: 2021-12-11 | End: 2022-03-04

## 2021-12-11 RX ADMIN — KETOROLAC TROMETHAMINE 30 MG: 30 INJECTION, SOLUTION INTRAMUSCULAR at 16:38

## 2021-12-13 DIAGNOSIS — M25.561 CHRONIC PAIN OF RIGHT KNEE: Primary | ICD-10-CM

## 2021-12-13 DIAGNOSIS — R93.6 ABNORMAL X-RAY OF KNEE: ICD-10-CM

## 2021-12-13 DIAGNOSIS — R93.89 ABNORMAL X-RAY: ICD-10-CM

## 2021-12-13 DIAGNOSIS — G89.29 CHRONIC PAIN OF RIGHT KNEE: Primary | ICD-10-CM

## 2021-12-26 ENCOUNTER — OFFICE VISIT (OUTPATIENT)
Dept: URGENT CARE | Facility: CLINIC | Age: 21
End: 2021-12-26
Payer: COMMERCIAL

## 2021-12-26 ENCOUNTER — APPOINTMENT (OUTPATIENT)
Dept: URGENT CARE | Facility: CLINIC | Age: 21
End: 2021-12-26
Payer: COMMERCIAL

## 2021-12-26 VITALS — HEART RATE: 98 BPM | OXYGEN SATURATION: 99 % | TEMPERATURE: 97.6 F | RESPIRATION RATE: 18 BRPM

## 2021-12-26 DIAGNOSIS — H10.9 CONJUNCTIVITIS OF LEFT EYE, UNSPECIFIED CONJUNCTIVITIS TYPE: ICD-10-CM

## 2021-12-26 DIAGNOSIS — J02.9 SORE THROAT: Primary | ICD-10-CM

## 2021-12-26 LAB — S PYO AG THROAT QL: NEGATIVE

## 2021-12-26 PROCEDURE — 87880 STREP A ASSAY W/OPTIC: CPT | Performed by: PHYSICIAN ASSISTANT

## 2021-12-26 PROCEDURE — 99214 OFFICE O/P EST MOD 30 MIN: CPT | Performed by: PHYSICIAN ASSISTANT

## 2021-12-26 RX ORDER — POLYMYXIN B SULFATE AND TRIMETHOPRIM 1; 10000 MG/ML; [USP'U]/ML
1 SOLUTION OPHTHALMIC EVERY 6 HOURS
Qty: 10 ML | Refills: 0 | Status: SHIPPED | OUTPATIENT
Start: 2021-12-26 | End: 2022-01-02

## 2021-12-31 ENCOUNTER — HOSPITAL ENCOUNTER (OUTPATIENT)
Dept: MRI IMAGING | Facility: HOSPITAL | Age: 21
Discharge: HOME | End: 2021-12-31
Payer: COMMERCIAL

## 2021-12-31 DIAGNOSIS — G89.29 CHRONIC PAIN OF RIGHT KNEE: ICD-10-CM

## 2021-12-31 DIAGNOSIS — M25.561 CHRONIC PAIN OF RIGHT KNEE: ICD-10-CM

## 2021-12-31 DIAGNOSIS — R93.6 ABNORMAL X-RAY OF KNEE: ICD-10-CM

## 2021-12-31 PROCEDURE — G1004 CDSM NDSC: HCPCS

## 2021-12-31 PROCEDURE — 73721 MRI JNT OF LWR EXTRE W/O DYE: CPT

## 2022-01-03 NOTE — PROGRESS NOTES
Pre op  Just needs labs including nicotine to submit to insurance  Has been having 3 meals per day  Drinking 48-72 oz of water daily  Getting about 3k steps per day  Working a second job a [employer] and has been walking when she works there  Continues to not vape- will ask RD to order nicotine labs   HC LCSW

## 2022-01-04 ENCOUNTER — TELEPHONE (OUTPATIENT)
Dept: OBGYN CLINIC | Facility: CLINIC | Age: 22
End: 2022-01-04

## 2022-01-04 DIAGNOSIS — R93.6 ABNORMAL MRI, KNEE: Primary | ICD-10-CM

## 2022-01-04 DIAGNOSIS — G89.29 CHRONIC PAIN OF RIGHT KNEE: ICD-10-CM

## 2022-01-04 DIAGNOSIS — M23.8X1 CHONDRAL DEFECT OF CONDYLE OF RIGHT FEMUR: ICD-10-CM

## 2022-01-04 DIAGNOSIS — M23.8X1 CHONDRAL DEFECT OF RIGHT PATELLA: ICD-10-CM

## 2022-01-04 DIAGNOSIS — M25.561 CHRONIC PAIN OF RIGHT KNEE: ICD-10-CM

## 2022-01-06 ENCOUNTER — OFFICE VISIT (OUTPATIENT)
Dept: BARIATRICS | Facility: CLINIC | Age: 22
End: 2022-01-06

## 2022-01-06 ENCOUNTER — TELEPHONE (OUTPATIENT)
Dept: PSYCHIATRY | Facility: CLINIC | Age: 22
End: 2022-01-06

## 2022-01-06 VITALS — BODY MASS INDEX: 45.52 KG/M2 | WEIGHT: 293 LBS

## 2022-01-06 DIAGNOSIS — Z71.89 ENCOUNTER FOR PRE-BARIATRIC SURGERY COUNSELING AND EDUCATION: Primary | ICD-10-CM

## 2022-01-06 DIAGNOSIS — F90.0 ATTENTION DEFICIT HYPERACTIVITY DISORDER (ADHD), PREDOMINANTLY INATTENTIVE TYPE: ICD-10-CM

## 2022-01-06 PROCEDURE — RECHECK

## 2022-01-06 RX ORDER — DEXTROAMPHETAMINE SACCHARATE, AMPHETAMINE ASPARTATE MONOHYDRATE, DEXTROAMPHETAMINE SULFATE AND AMPHETAMINE SULFATE 7.5; 7.5; 7.5; 7.5 MG/1; MG/1; MG/1; MG/1
30 CAPSULE, EXTENDED RELEASE ORAL EVERY MORNING
Qty: 30 CAPSULE | Refills: 0 | Status: SHIPPED | OUTPATIENT
Start: 2022-01-06 | End: 2022-02-17 | Stop reason: SDUPTHER

## 2022-01-14 ENCOUNTER — OFFICE VISIT (OUTPATIENT)
Dept: OBGYN CLINIC | Facility: CLINIC | Age: 22
End: 2022-01-14
Payer: COMMERCIAL

## 2022-01-14 VITALS
DIASTOLIC BLOOD PRESSURE: 88 MMHG | HEART RATE: 96 BPM | SYSTOLIC BLOOD PRESSURE: 140 MMHG | WEIGHT: 293 LBS | HEIGHT: 68 IN | BODY MASS INDEX: 44.41 KG/M2

## 2022-01-14 DIAGNOSIS — G89.29 CHRONIC PAIN OF RIGHT KNEE: ICD-10-CM

## 2022-01-14 DIAGNOSIS — M95.8 OSTEOCHONDRAL DEFECT OF CONDYLE OF FEMUR: Primary | ICD-10-CM

## 2022-01-14 DIAGNOSIS — M25.561 CHRONIC PAIN OF RIGHT KNEE: ICD-10-CM

## 2022-01-14 PROCEDURE — 99204 OFFICE O/P NEW MOD 45 MIN: CPT | Performed by: FAMILY MEDICINE

## 2022-01-14 PROCEDURE — 3008F BODY MASS INDEX DOCD: CPT | Performed by: FAMILY MEDICINE

## 2022-01-14 PROCEDURE — 1036F TOBACCO NON-USER: CPT | Performed by: FAMILY MEDICINE

## 2022-01-14 NOTE — LETTER
January 14, 2022     Mitchel Wade PA-C  [address] [place] [address]    Patient: Lashae Farah   YOB: 2000   Date of Visit: 1/14/2022       Dear Dr Adi Estrada: Thank you for referring Lashae Farah to me for evaluation  Below are my notes for this consultation  If you have questions, please do not hesitate to call me  I look forward to following your patient along with you  Sincerely,        Salvador Barragan MD        CC: JOHANNA Tiwari MD  1/14/2022  8:34 AM  Incomplete  [facility] [address]  [facility] [address]  [phone]  [phone]      Chief Complaint:  Chief Complaint   Patient presents with    Right Knee - Pain       Vitals:  /88   Pulse 96   Ht 5' 7 5" (1 715 m)   Wt 134 kg (296 lb)   BMI 45 68 kg/m²     The following portions of the patient's history were reviewed and updated as appropriate: allergies, current medications, past family history, past medical history, past social history, past surgical history, and problem list       Subjective:   Patient ID: Lashae Farah is a 24 y o  female  Here c/o R knee pain  Chronic knee pain for a couple of years  Seen in ER  XR done  MRI recommended  Note reviewed  Injured R knee in middle/HS  No recent injury  She fell on her knee in 7th grade- pain on/off  Clicks  Denies locking/swelling  Maybe giving out  Sharp pain  No pain meds taken  Better at rest   Intermittent pain      RIGHT KNEE     INDICATION:   pain      COMPARISON:  3/3/2019     VIEWS:  XR KNEE 4+ VW RIGHT INJURY         FINDINGS:     There is no acute fracture or dislocation      There is no joint effusion      Medial femoral condyle heterogeneous sclerotic and lucent circumscribed lesion     Soft tissues are unremarkable      IMPRESSION:     Medial femoral condyle osteochondral defect      Recommend MRI      No acute osseous abnormality      The study was marked in EPIC for significant notification           MRI RIGHT KNEE     INDICATION:   M25 561: Pain in right knee  G89 29: Other chronic pain  R93 6: Abnormal findings on diagnostic imaging of limbs      COMPARISON: 12/11/2021     TECHNIQUE:    MR sequences were obtained of the right knee including:  Sagittal STIR PD, coronal STIR and T1, axial STIR        Gadolinium was not used      FINDINGS:     SUBCUTANEOUS TISSUES: Normal     JOINT EFFUSION: None      BAKER'S CYST: None      MENISCI: Intact      CRUCIATE LIGAMENTS: Intact      EXTENSOR APPARATUS: There is focal edema in the superolateral corner of Hoffa's fat pad, which may indicate patellofemoral tracking or impingement abnormality       COLLATERAL LIGAMENTS: Intact      ARTICULAR SURFACES: There is an osteochondral defect involving the notch half of the weightbearing surface of the medial femoral condyle  There is edema and small cysts as well as fluid signal undercutting the defect  This defect measures 2 3 cm AP by   1 5 cm medial lateral   Grade 3 chondral fissuring of the inferior patellar apex  Grade 2-3 chondrosis lateral patellar facet      BONES: No marrow infiltrative process identified      MUSCULATURE:  Intact         IMPRESSION:     Osteochondral defect of the medial femoral condyle, as described  There are MRI findings of instability, and this fragment is at risk of displacement      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      Focal edema in the superolateral corner of Hoffa's fat pad, which may indicate patellofemoral tracking or impingement abnormality                      Review of Systems   Constitutional: Negative for fatigue and fever  Respiratory: Negative for shortness of breath  Cardiovascular: Negative for chest pain  Gastrointestinal: Negative for abdominal pain and nausea  Genitourinary: Negative for dysuria  Musculoskeletal: Positive for arthralgias  Skin: Negative for rash and wound     Neurological: Negative for weakness and headaches  Objective:  Right Knee Exam     Tenderness   The patient is experiencing tenderness in the medial joint line  Range of Motion   The patient has normal right knee ROM  Tests   Jimenez:  Medial - negative Lateral - negative  Varus: negative Valgus: negative    Other   Swelling: none  Effusion: effusion present          Observations     Right Knee   Positive for effusion  Physical Exam  Vitals and nursing note reviewed  Constitutional:       Appearance: Normal appearance  She is well-developed  HENT:      Head: Normocephalic  Mouth/Throat:      Mouth: Mucous membranes are moist    Eyes:      Extraocular Movements: Extraocular movements intact  Cardiovascular:      Rate and Rhythm: Normal rate and regular rhythm  Heart sounds: Normal heart sounds  Pulmonary:      Effort: Pulmonary effort is normal       Breath sounds: Normal breath sounds  Abdominal:      General: Bowel sounds are normal       Palpations: Abdomen is soft  Musculoskeletal:         General: Tenderness present  Normal range of motion  Cervical back: Normal range of motion  Right knee: Effusion present  Instability Tests: Medial Jimenez test negative and lateral Jimenez test negative  Skin:     General: Skin is warm and dry  Neurological:      General: No focal deficit present  Mental Status: She is alert and oriented to person, place, and time  Psychiatric:         Mood and Affect: Mood normal          Behavior: Behavior normal          Thought Content: Thought content normal          I have personally reviewed pertinent films in PACS and my interpretation is MRI R knee-  OCD defect medial femoral condyle  Assessment/Plan:  Assessment/Plan   Diagnoses and all orders for this visit:    Osteochondral defect of condyle of femur  -     Ambulatory Referral to Orthopedic Surgery;  Future    Chronic pain of right knee  -     Ambulatory referral to Orthopedic Surgery  - Ambulatory Referral to Orthopedic Surgery; Future        Return for f/u with Dr [facility]- [facility] surgery, Tarah Mar MD

## 2022-01-14 NOTE — PATIENT INSTRUCTIONS
F/u here as needed  Referral to Dr Rodriguez Leak- Ortho surgery  Possible surgical option  Icing/heat/OTC pain meds as needed

## 2022-01-14 NOTE — PROGRESS NOTES
[facility] [place]  [address]  [facility] [id number] [phone]      Chief Complaint:  Chief Complaint   Patient presents with    Right Knee - Pain       Vitals:  /88   Pulse 96   Ht 5' 7 5" (1 715 m)   Wt 134 kg (296 lb)   BMI 45 68 kg/m²     The following portions of the patient's history were reviewed and updated as appropriate: allergies, current medications, past family history, past medical history, past social history, past surgical history, and problem list       Subjective:   Patient ID: Daniel Fuentes is a 24 y o  female  Here c/o R knee pain  Chronic knee pain for a couple of years  Seen in ER  XR done  MRI recommended  Note reviewed  Injured R knee in middle/HS  No recent injury  She fell on her knee in 7th grade- pain on/off  Clicks  Denies locking/swelling  Maybe giving out  Sharp pain  No pain meds taken  Better at rest   Intermittent pain  RIGHT KNEE     INDICATION:   pain      COMPARISON:  3/3/2019     VIEWS:  XR KNEE 4+ VW RIGHT INJURY         FINDINGS:     There is no acute fracture or dislocation      There is no joint effusion      Medial femoral condyle heterogeneous sclerotic and lucent circumscribed lesion     Soft tissues are unremarkable      IMPRESSION:     Medial femoral condyle osteochondral defect      Recommend MRI      No acute osseous abnormality      The study was marked in EPIC for significant notification           MRI RIGHT KNEE     INDICATION:   M25 561: Pain in right knee  G89 29:  Other chronic pain  R93 6: Abnormal findings on diagnostic imaging of limbs      COMPARISON: 12/11/2021     TECHNIQUE:    MR sequences were obtained of the right knee including:  Sagittal STIR PD, coronal STIR and T1, axial STIR        Gadolinium was not used      FINDINGS:     SUBCUTANEOUS TISSUES: Normal     JOINT EFFUSION: None      BAKER'S CYST: None      MENISCI: Intact      CRUCIATE LIGAMENTS: Intact      EXTENSOR APPARATUS: There is focal edema in the superolateral corner of Hoffa's fat pad, which may indicate patellofemoral tracking or impingement abnormality       COLLATERAL LIGAMENTS: Intact      ARTICULAR SURFACES: There is an osteochondral defect involving the notch half of the weightbearing surface of the medial femoral condyle  There is edema and small cysts as well as fluid signal undercutting the defect  This defect measures 2 3 cm AP by   1 5 cm medial lateral   Grade 3 chondral fissuring of the inferior patellar apex  Grade 2-3 chondrosis lateral patellar facet      BONES: No marrow infiltrative process identified      MUSCULATURE:  Intact         IMPRESSION:     Osteochondral defect of the medial femoral condyle, as described  There are MRI findings of instability, and this fragment is at risk of displacement      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      Focal edema in the superolateral corner of Hoffa's fat pad, which may indicate patellofemoral tracking or impingement abnormality                      Review of Systems   Constitutional: Negative for fatigue and fever  Respiratory: Negative for shortness of breath  Cardiovascular: Negative for chest pain  Gastrointestinal: Negative for abdominal pain and nausea  Genitourinary: Negative for dysuria  Musculoskeletal: Positive for arthralgias  Skin: Negative for rash and wound  Neurological: Negative for weakness and headaches  Objective:  Right Knee Exam     Tenderness   The patient is experiencing tenderness in the medial joint line  Range of Motion   The patient has normal right knee ROM  Tests   Jimenez:  Medial - negative Lateral - negative  Varus: negative Valgus: negative    Other   Swelling: none  Effusion: effusion present          Observations     Right Knee   Positive for effusion  Physical Exam  Vitals and nursing note reviewed  Constitutional:       Appearance: Normal appearance   She is well-developed  HENT:      Head: Normocephalic  Mouth/Throat:      Mouth: Mucous membranes are moist    Eyes:      Extraocular Movements: Extraocular movements intact  Cardiovascular:      Rate and Rhythm: Normal rate and regular rhythm  Heart sounds: Normal heart sounds  Pulmonary:      Effort: Pulmonary effort is normal       Breath sounds: Normal breath sounds  Abdominal:      General: Bowel sounds are normal       Palpations: Abdomen is soft  Musculoskeletal:         General: Tenderness present  Normal range of motion  Cervical back: Normal range of motion  Right knee: Effusion present  Instability Tests: Medial Jimenez test negative and lateral Jimenez test negative  Skin:     General: Skin is warm and dry  Neurological:      General: No focal deficit present  Mental Status: She is alert and oriented to person, place, and time  Psychiatric:         Mood and Affect: Mood normal          Behavior: Behavior normal          Thought Content: Thought content normal          I have personally reviewed pertinent films in PACS and my interpretation is MRI R knee-  OCD defect medial femoral condyle  Assessment/Plan:  Assessment/Plan   Diagnoses and all orders for this visit:    Osteochondral defect of condyle of femur  -     Ambulatory Referral to Orthopedic Surgery; Future    Chronic pain of right knee  -     Ambulatory referral to Orthopedic Surgery  -     Ambulatory Referral to Orthopedic Surgery; Future        Return for f/u with Dr Erlin Rossi- [facility] surgery, Simon Wade MD

## 2022-01-31 ENCOUNTER — APPOINTMENT (OUTPATIENT)
Dept: LAB | Facility: CLINIC | Age: 22
End: 2022-01-31
Payer: COMMERCIAL

## 2022-01-31 DIAGNOSIS — Z01.818 PREOP TESTING: ICD-10-CM

## 2022-01-31 DIAGNOSIS — E03.9 ACQUIRED HYPOTHYROIDISM: ICD-10-CM

## 2022-01-31 DIAGNOSIS — E66.01 MORBID (SEVERE) OBESITY DUE TO EXCESS CALORIES (HCC): ICD-10-CM

## 2022-01-31 DIAGNOSIS — R79.89 HIGH SERUM THYROID STIMULATING HORMONE (TSH): ICD-10-CM

## 2022-01-31 LAB
ALBUMIN SERPL BCP-MCNC: 3.9 G/DL (ref 3.5–5)
ALP SERPL-CCNC: 55 U/L (ref 46–116)
ALT SERPL W P-5'-P-CCNC: 33 U/L (ref 12–78)
ANION GAP SERPL CALCULATED.3IONS-SCNC: 5 MMOL/L (ref 4–13)
AST SERPL W P-5'-P-CCNC: 25 U/L (ref 5–45)
BILIRUB SERPL-MCNC: 0.55 MG/DL (ref 0.2–1)
BUN SERPL-MCNC: 10 MG/DL (ref 5–25)
CALCIUM SERPL-MCNC: 9.2 MG/DL (ref 8.3–10.1)
CHLORIDE SERPL-SCNC: 108 MMOL/L (ref 100–108)
CHOLEST SERPL-MCNC: 159 MG/DL
CO2 SERPL-SCNC: 26 MMOL/L (ref 21–32)
CREAT SERPL-MCNC: 0.81 MG/DL (ref 0.6–1.3)
ERYTHROCYTE [DISTWIDTH] IN BLOOD BY AUTOMATED COUNT: 12.4 % (ref 11.6–15.1)
GFR SERPL CREATININE-BSD FRML MDRD: 103 ML/MIN/1.73SQ M
GLUCOSE P FAST SERPL-MCNC: 82 MG/DL (ref 65–99)
HCT VFR BLD AUTO: 40 % (ref 34.8–46.1)
HDLC SERPL-MCNC: 55 MG/DL
HGB BLD-MCNC: 13 G/DL (ref 11.5–15.4)
LDLC SERPL CALC-MCNC: 85 MG/DL (ref 0–100)
MCH RBC QN AUTO: 29.3 PG (ref 26.8–34.3)
MCHC RBC AUTO-ENTMCNC: 32.5 G/DL (ref 31.4–37.4)
MCV RBC AUTO: 90 FL (ref 82–98)
NONHDLC SERPL-MCNC: 104 MG/DL
PLATELET # BLD AUTO: 321 THOUSANDS/UL (ref 149–390)
PMV BLD AUTO: 9.5 FL (ref 8.9–12.7)
POTASSIUM SERPL-SCNC: 4.2 MMOL/L (ref 3.5–5.3)
PROT SERPL-MCNC: 7.7 G/DL (ref 6.4–8.2)
RBC # BLD AUTO: 4.44 MILLION/UL (ref 3.81–5.12)
SODIUM SERPL-SCNC: 139 MMOL/L (ref 136–145)
T4 FREE SERPL-MCNC: 0.97 NG/DL (ref 0.76–1.46)
TRIGL SERPL-MCNC: 94 MG/DL
TSH SERPL DL<=0.05 MIU/L-ACNC: 4.54 UIU/ML (ref 0.36–3.74)
WBC # BLD AUTO: 4.86 THOUSAND/UL (ref 4.31–10.16)

## 2022-01-31 PROCEDURE — 80323 ALKALOIDS NOS: CPT

## 2022-01-31 PROCEDURE — 80061 LIPID PANEL: CPT

## 2022-01-31 PROCEDURE — 36415 COLL VENOUS BLD VENIPUNCTURE: CPT

## 2022-01-31 PROCEDURE — 84439 ASSAY OF FREE THYROXINE: CPT

## 2022-01-31 PROCEDURE — 80053 COMPREHEN METABOLIC PANEL: CPT

## 2022-01-31 PROCEDURE — 83036 HEMOGLOBIN GLYCOSYLATED A1C: CPT

## 2022-01-31 PROCEDURE — 84443 ASSAY THYROID STIM HORMONE: CPT

## 2022-01-31 PROCEDURE — 85027 COMPLETE CBC AUTOMATED: CPT

## 2022-02-01 LAB
EST. AVERAGE GLUCOSE BLD GHB EST-MCNC: 103 MG/DL
HBA1C MFR BLD: 5.2 %

## 2022-02-07 LAB
COTININE SERPL-MCNC: <1 NG/ML
NICOTINE SERPL-MCNC: <1 NG/ML

## 2022-02-08 DIAGNOSIS — E03.9 ACQUIRED HYPOTHYROIDISM: ICD-10-CM

## 2022-02-08 DIAGNOSIS — R79.89 HIGH SERUM THYROID STIMULATING HORMONE (TSH): Primary | ICD-10-CM

## 2022-02-08 RX ORDER — LEVOTHYROXINE SODIUM 0.07 MG/1
75 TABLET ORAL DAILY
Qty: 90 TABLET | Refills: 1 | Status: SHIPPED | OUTPATIENT
Start: 2022-02-08

## 2022-02-21 ENCOUNTER — TELEPHONE (OUTPATIENT)
Dept: OBGYN CLINIC | Facility: MEDICAL CENTER | Age: 22
End: 2022-02-21

## 2022-03-04 ENCOUNTER — OFFICE VISIT (OUTPATIENT)
Dept: FAMILY MEDICINE CLINIC | Facility: CLINIC | Age: 22
End: 2022-03-04
Payer: COMMERCIAL

## 2022-03-04 VITALS
OXYGEN SATURATION: 99 % | SYSTOLIC BLOOD PRESSURE: 120 MMHG | HEART RATE: 79 BPM | HEIGHT: 68 IN | BODY MASS INDEX: 43.16 KG/M2 | WEIGHT: 284.8 LBS | TEMPERATURE: 97.6 F | DIASTOLIC BLOOD PRESSURE: 84 MMHG

## 2022-03-04 DIAGNOSIS — F90.8 ATTENTION DEFICIT HYPERACTIVITY DISORDER (ADHD), OTHER TYPE: ICD-10-CM

## 2022-03-04 DIAGNOSIS — Z00.00 ANNUAL PHYSICAL EXAM: Primary | ICD-10-CM

## 2022-03-04 DIAGNOSIS — E03.9 ACQUIRED HYPOTHYROIDISM: ICD-10-CM

## 2022-03-04 DIAGNOSIS — Z23 NEED FOR TDAP VACCINATION: ICD-10-CM

## 2022-03-04 PROCEDURE — 3725F SCREEN DEPRESSION PERFORMED: CPT | Performed by: PHYSICIAN ASSISTANT

## 2022-03-04 PROCEDURE — 99395 PREV VISIT EST AGE 18-39: CPT | Performed by: PHYSICIAN ASSISTANT

## 2022-03-04 PROCEDURE — 3008F BODY MASS INDEX DOCD: CPT | Performed by: PHYSICIAN ASSISTANT

## 2022-03-04 PROCEDURE — 90471 IMMUNIZATION ADMIN: CPT

## 2022-03-04 PROCEDURE — 1036F TOBACCO NON-USER: CPT | Performed by: PHYSICIAN ASSISTANT

## 2022-03-04 PROCEDURE — 90715 TDAP VACCINE 7 YRS/> IM: CPT

## 2022-03-04 NOTE — PATIENT INSTRUCTIONS
Wellness Visit for Adults   AMBULATORY CARE:   A wellness visit  is when you see your healthcare provider to get screened for health problems  Your healthcare provider will also give you advice on how to stay healthy  Write down your questions so you remember to ask them  Ask your healthcare provider how often you should have a wellness visit  What happens at a wellness visit:  Your healthcare provider will ask about your health, and your family history of health problems  This includes high blood pressure, heart disease, and cancer  He or she will ask if you have symptoms that concern you, if you smoke, and about your mood  You may also be asked about your intake of medicines, supplements, food, and alcohol  Any of the following may be done:  · Your weight  will be checked  Your height may also be checked so your body mass index (BMI) can be calculated  Your BMI shows if you are at a healthy weight  · Your blood pressure  and heart rate will be checked  Your temperature may also be checked  · Blood and urine tests  may be done  Blood tests may be done to check your cholesterol levels  Abnormal cholesterol levels increase your risk for heart disease and stroke  You may also need a blood or urine test to check for diabetes if you are at increased risk  Urine tests may be done to look for signs of an infection or kidney disease  · A physical exam  includes checking your heartbeat and lungs with a stethoscope  Your healthcare provider may also check your skin to look for sun damage  · Screening tests  may be recommended  A screening test is done to check for diseases that may not cause symptoms  The screening tests you may need depend on your age, gender, family history, and lifestyle habits  For example, colorectal screening may be recommended if you are 48years old or older  Screening tests you need if you are a woman:   · A Pap smear  is used to screen for cervical cancer   Pap smears are usually done every 3 to 5 years depending on your age  You may need them more often if you have had abnormal Pap smear test results in the past  Ask your healthcare provider how often you should have a Pap smear  · A mammogram  is an x-ray of your breasts to screen for breast cancer  Experts recommend mammograms every 2 years starting at age 48 years  You may need a mammogram at age 52 years or younger if you have an increased risk for breast cancer  Talk to your healthcare provider about when you should start having mammograms and how often you need them  Vaccines you may need:   · Get an influenza vaccine  every year  The influenza vaccine protects you from the flu  Several types of viruses cause the flu  The viruses change over time, so new vaccines are made each year  · Get a tetanus-diphtheria (Td) booster vaccine  every 10 years  This vaccine protects you against tetanus and diphtheria  Tetanus is a severe infection that may cause painful muscle spasms and lockjaw  Diphtheria is a severe bacterial infection that causes a thick covering in the back of your mouth and throat  · Get a human papillomavirus (HPV) vaccine  if you are female and aged 23 to 32 or male 23 to 24 and never received it  This vaccine protects you from HPV infection  HPV is the most common infection spread by sexual contact  HPV may also cause vaginal, penile, and anal cancers  · Get a pneumococcal vaccine  if you are aged 72 years or older  The pneumococcal vaccine is an injection given to protect you from pneumococcal disease  Pneumococcal disease is an infection caused by pneumococcal bacteria  The infection may cause pneumonia, meningitis, or an ear infection  · Get a shingles vaccine  if you are 60 or older, even if you have had shingles before  The shingles vaccine is an injection to protect you from the varicella-zoster virus  This is the same virus that causes chickenpox   Shingles is a painful rash that develops in people who had chickenpox or have been exposed to the virus  How to eat healthy:  My Plate is a model for planning healthy meals  It shows the types and amounts of foods that should go on your plate  Fruits and vegetables make up about half of your plate, and grains and protein make up the other half  A serving of dairy is included on the side of your plate  The amount of calories and serving sizes you need depends on your age, gender, weight, and height  Examples of healthy foods are listed below:  · Eat a variety of vegetables  such as dark green, red, and orange vegetables  You can also include canned vegetables low in sodium (salt) and frozen vegetables without added butter or sauces  · Eat a variety of fresh fruits , canned fruit in 100% juice, frozen fruit, and dried fruit  · Include whole grains  At least half of the grains you eat should be whole grains  Examples include whole-wheat bread, wheat pasta, brown rice, and whole-grain cereals such as oatmeal     · Eat a variety of protein foods such as seafood (fish and shellfish), lean meat, and poultry without skin (turkey and chicken)  Examples of lean meats include pork leg, shoulder, or tenderloin, and beef round, sirloin, tenderloin, and extra lean ground beef  Other protein foods include eggs and egg substitutes, beans, peas, soy products, nuts, and seeds  · Choose low-fat dairy products such as skim or 1% milk or low-fat yogurt, cheese, and cottage cheese  · Limit unhealthy fats  such as butter, hard margarine, and shortening  Exercise:  Exercise at least 30 minutes per day on most days of the week  Some examples of exercise include walking, biking, dancing, and swimming  You can also fit in more physical activity by taking the stairs instead of the elevator or parking farther away from stores  Include muscle strengthening activities 2 days each week  Regular exercise provides many health benefits   It helps you manage your weight, and decreases your risk for type 2 diabetes, heart disease, stroke, and high blood pressure  Exercise can also help improve your mood  Ask your healthcare provider about the best exercise plan for you  General health and safety guidelines:   · Do not smoke  Nicotine and other chemicals in cigarettes and cigars can cause lung damage  Ask your healthcare provider for information if you currently smoke and need help to quit  E-cigarettes or smokeless tobacco still contain nicotine  Talk to your healthcare provider before you use these products  · Limit alcohol  A drink of alcohol is 12 ounces of beer, 5 ounces of wine, or 1½ ounces of liquor  · Lose weight, if needed  Being overweight increases your risk of certain health conditions  These include heart disease, high blood pressure, type 2 diabetes, and certain types of cancer  · Protect your skin  Do not sunbathe or use tanning beds  Use sunscreen with a SPF 15 or higher  Apply sunscreen at least 15 minutes before you go outside  Reapply sunscreen every 2 hours  Wear protective clothing, hats, and sunglasses when you are outside  · Drive safely  Always wear your seatbelt  Make sure everyone in your car wears a seatbelt  A seatbelt can save your life if you are in an accident  Do not use your cell phone when you are driving  This could distract you and cause an accident  Pull over if you need to make a call or send a text message  · Practice safe sex  Use latex condoms if are sexually active and have more than one partner  Your healthcare provider may recommend screening tests for sexually transmitted infections (STIs)  · Wear helmets, lifejackets, and protective gear  Always wear a helmet when you ride a bike or motorcycle, go skiing, or play sports that could cause a head injury  Wear protective equipment when you play sports  Wear a lifejacket when you are on a boat or doing water sports      © Copyright [employer] 2022 Information is for End User's use only and may not be sold, redistributed or otherwise used for commercial purposes  All illustrations and images included in CareNotes® are the copyrighted property of [employer] A M [employer]  or Martha Myles  The above information is an  only  It is not intended as medical advice for individual conditions or treatments  Talk to your doctor, nurse or pharmacist before following any medical regimen to see if it is safe and effective for you  Weight Management   AMBULATORY CARE:   Why it is important to manage your weight:  Being overweight increases your risk of health conditions such as heart disease, high blood pressure, type 2 diabetes, and certain types of cancer  It can also increase your risk for osteoarthritis, sleep apnea, and other respiratory problems  Aim for a slow, steady weight loss  Even a small amount of weight loss can lower your risk of health problems  Risks of being overweight:  Extra weight can cause many health problems, including the following:  · Diabetes (high blood sugar level)    · High blood pressure or high cholesterol    · Heart disease    · Stroke    · Gallbladder or liver disease    · Cancer of the colon, breast, prostate, liver, or kidney    · Sleep apnea    · Arthritis or gout    Screening  is done to check for health conditions before you have signs or symptoms  If you are 28to 79years old, your blood sugar level may be checked every 3 years for signs of prediabetes or diabetes  Your healthcare provider will check your blood pressure at each visit  High blood pressure can lead to a stroke or other problems  Your provider may check for signs of heart disease, cancer, or other health problems  How to lose weight safely:  A safe and healthy way to lose weight is to eat fewer calories and get regular exercise  · You can lose up about 1 pound a week by decreasing the number of calories you eat by 500 calories each day   You can decrease calories by eating smaller portion sizes or by cutting out high-calorie foods  Read labels to find out how many calories are in the foods you eat  · You can also burn calories with exercise such as walking, swimming, or biking  You will be more likely to keep weight off if you make these changes part of your lifestyle  Exercise at least 30 minutes per day on most days of the week  You can also fit in more physical activity by taking the stairs instead of the elevator or parking farther away from stores  Ask your healthcare provider about the best exercise plan for you  Healthy meal plan for weight management:  A healthy meal plan includes a variety of foods, contains fewer calories, and helps you stay healthy  A healthy meal plan includes the following:     · Eat whole-grain foods more often  A healthy meal plan should contain fiber  Fiber is the part of grains, fruits, and vegetables that is not broken down by your body  Whole-grain foods are healthy and provide extra fiber in your diet  Some examples of whole-grain foods are whole-wheat breads and pastas, oatmeal, brown rice, and bulgur  · Eat a variety of vegetables every day  Include dark, leafy greens such as spinach, kale, ming greens, and mustard greens  Eat yellow and orange vegetables such as carrots, sweet potatoes, and winter squash  · Eat a variety of fruits every day  Choose fresh or canned fruit (canned in its own juice or light syrup) instead of juice  Fruit juice has very little or no fiber  · Eat low-fat dairy foods  Drink fat-free (skim) milk or 1% milk  Eat fat-free yogurt and low-fat cottage cheese  Try low-fat cheeses such as mozzarella and other reduced-fat cheeses  · Choose meat and other protein foods that are low in fat  Choose beans or other legumes such as split peas or lentils  Choose fish, skinless poultry (chicken or turkey), or lean cuts of red meat (beef or pork)   Before you cook meat or poultry, cut off any visible fat  · Use less fat and oil  Try baking foods instead of frying them  Add less fat, such as margarine, sour cream, regular salad dressing and mayonnaise to foods  Eat fewer high-fat foods  Some examples of high-fat foods include french fries, doughnuts, ice cream, and cakes  · Eat fewer sweets  Limit foods and drinks that are high in sugar  This includes candy, cookies, regular soda, and sweetened drinks  Ways to decrease calories:   · Eat smaller portions  ? Use a small plate with smaller servings  ? Do not eat second helpings  ? When you eat at a restaurant, ask for a box and place half of your meal in the box before you eat  ? Share an entrée with someone else  · Replace high-calorie snacks with healthy, low-calorie snacks  ? Choose fresh fruit, vegetables, fat-free rice cakes, or air-popped popcorn instead of potato chips, nuts, or chocolate  ? Choose water or calorie-free drinks instead of soda or sweetened drinks  · Do not shop for groceries when you are hungry  You may be more likely to make unhealthy food choices  Take a grocery list of healthy foods and shop after you have eaten  · Eat regular meals  Do not skip meals  Skipping meals can lead to overeating later in the day  This can make it harder for you to lose weight  Eat a healthy snack in place of a meal if you do not have time to eat a regular meal  Talk with a dietitian to help you create a meal plan and schedule that is right for you  Other things to consider as you try to lose weight:   · Be aware of situations that may give you the urge to overeat, such as eating while watching television  Find ways to avoid these situations  For example, read a book, go for a walk, or do crafts  · Meet with a weight loss support group or friends who are also trying to lose weight  This may help you stay motivated to continue working on your weight loss goals      © Copyright [employer] 2022 Information is for End User's use only and may not be sold, redistributed or otherwise used for commercial purposes  All illustrations and images included in CareNotes® are the copyrighted property of A [employer] A M , Inc  or Martha Myles  The above information is an  only  It is not intended as medical advice for individual conditions or treatments  Talk to your doctor, nurse or pharmacist before following any medical regimen to see if it is safe and effective for you

## 2022-03-04 NOTE — PROGRESS NOTES
[address]    NAME: Julee Lau  AGE: 24 y o  SEX: female  : 2000     DATE: 3/4/2022     Assessment and Plan:     Problem List Items Addressed This Visit        Endocrine    Acquired hypothyroidism  awaiting repeat labs since increased dose to 75 mcg, feels well        Other    ADHD (attention deficit hyperactivity disorder)  symptoms well controlled on current medication regimen, able to function well at home and work; no red flags PDMP      Other Visit Diagnoses     Annual physical exam    -  Primary    Need for Tdap vaccination        Relevant Orders    TDAP VACCINE GREATER THAN OR EQUAL TO 6YO IM (Completed)          Immunizations and preventive care screenings were discussed with patient today  Appropriate education was printed on patient's after visit summary  Counseling:  Dental Health: discussed importance of regular tooth brushing, flossing, and dental visits  · Exercise: the importance of regular exercise/physical activity was discussed  Recommend exercise 3-5 times per week for at least 30 minutes  Depression Screening and Follow-up Plan: Patient was screened for depression during today's encounter  They screened negative with a PHQ-2 score of 0  Return in 6 months (on 2022) for Recheck  Chief Complaint:     Chief Complaint   Patient presents with    Follow-up     3 month      History of Present Illness:     Adult Annual Physical   Patient here for a comprehensive physical exam  The patient reports no problems  She started a job through [employer] in Oct, had a vaccine, believes it was MMR  Her anxiety is now minimal, has not need to use atarax  She feels may have been seasonal and covid related  She is no longer feeling down depressed  No longer having dissociative episodes   She declines need for psych eval      Diet and Physical Activity  · Diet/Nutrition: she quit soda, controlling portion sizes  She has lost 15 lbs  · Exercise: no formal exercise  Depression Screening  PHQ-2/9 Depression Screening    Little interest or pleasure in doing things: 0 - not at all  Feeling down, depressed, or hopeless: 0 - not at all  PHQ-2 Score: 0  PHQ-2 Interpretation: Negative depression screen       General Health  · Sleep: gets 7-8 hours of sleep on average  · Hearing: normal - bilateral   · Vision: looking for provider that accepts insurnace   · Dental: regular dental visits  /GYN Health  · Last menstrual period: Feb 25th  · Periods regular  · Contraceptive method: none  · History of STDs?: no      Review of Systems:     Review of Systems   Constitutional: Negative  Unexpected weight change: intentional wt loss    Eyes: Negative  Respiratory: Negative  Cardiovascular: Negative  Gastrointestinal: Negative  Genitourinary: Negative  Neurological: Negative  Psychiatric/Behavioral: Negative for dysphoric mood  Decreased concentration: stable  The patient is not nervous/anxious         Past Medical History:     Past Medical History:   Diagnosis Date    ADHD (attention deficit hyperactivity disorder)     Hypothyroid     Morbid obesity with BMI of 45 0-49 9, adult (HCC)       Past Surgical History:     Past Surgical History:   Procedure Laterality Date    EGD      WISDOM TOOTH EXTRACTION      WRIST SURGERY Right     secondary to a fracture      Social History:     Social History     Socioeconomic History    Marital status: Single     Spouse name: None    Number of children: None    Years of education: None    Highest education level: None   Occupational History    None   Tobacco Use    Smoking status: Never Smoker    Smokeless tobacco: Never Used   Vaping Use    Vaping Use: Some days    Substances: Nicotine, Flavoring   Substance and Sexual Activity    Alcohol use: Yes     Comment: socially    Drug use: Never    Sexual activity: Yes     Partners: Male     Birth control/protection: OCP   Other Topics Concern    None   Social History Narrative    None     Social Determinants of Health     Financial Resource Strain: Not on file   Food Insecurity: Not on file   Transportation Needs: No Transportation Needs    Lack of Transportation (Medical): No    Lack of Transportation (Non-Medical): No   Physical Activity: Not on file   Stress: Not on file   Social Connections: Not on file   Intimate Partner Violence: Not on file   Housing Stability: Not on file      Family History:     Family History   Problem Relation Age of Onset    Heart disease Paternal Grandfather     Diabetes Paternal Grandfather     Lung cancer Paternal Grandfather     Diabetes Sister         prediabetes    Thyroid disease Paternal Grandmother     Stroke Neg Hx       Current Medications:     Current Outpatient Medications   Medication Sig Dispense Refill    amphetamine-dextroamphetamine (ADDERALL XR) 30 MG 24 hr capsule Take 1 capsule (30 mg total) by mouth every morning Max Daily Amount: 30 mg 30 capsule 0    hydrOXYzine pamoate (VISTARIL) 25 mg capsule Take 1 capsule (25 mg total) by mouth 3 (three) times a day as needed for anxiety 60 capsule 0    levothyroxine 75 mcg tablet Take 1 tablet (75 mcg total) by mouth daily 90 tablet 1     No current facility-administered medications for this visit  Allergies:     No Known Allergies   Physical Exam:     /84 (BP Location: Left arm, Patient Position: Sitting, Cuff Size: Standard)   Pulse 79   Temp 97 6 °F (36 4 °C) (Tympanic)   Ht 5' 7 5" (1 715 m)   Wt 129 kg (284 lb 12 8 oz)   SpO2 99%   BMI 43 95 kg/m²     Physical Exam  Constitutional:       General: She is not in acute distress  Appearance: Normal appearance  She is obese  She is not diaphoretic  HENT:      Head: Normocephalic and atraumatic        Right Ear: Tympanic membrane, ear canal and external ear normal       Left Ear: Tympanic membrane, ear canal and external ear normal  Nose: Nose normal       Mouth/Throat:      Mouth: Mucous membranes are moist       Pharynx: Oropharynx is clear  Eyes:      Conjunctiva/sclera: Conjunctivae normal       Pupils: Pupils are equal, round, and reactive to light  Cardiovascular:      Rate and Rhythm: Normal rate and regular rhythm  Pulses: Normal pulses  Pulmonary:      Effort: Pulmonary effort is normal       Breath sounds: Normal breath sounds  No wheezing  Musculoskeletal:         General: No deformity or signs of injury  Cervical back: Normal range of motion and neck supple  Right lower leg: No edema  Left lower leg: No edema  Skin:     General: Skin is warm and dry  Findings: No erythema or rash  Neurological:      General: No focal deficit present  Mental Status: She is alert and oriented to person, place, and time  Cranial Nerves: No cranial nerve deficit  Psychiatric:         Mood and Affect: Mood normal          Behavior: Behavior normal          Thought Content:  Thought content normal          Judgment: Judgment normal           Eric Jorgensen PA-C   [facility]

## 2022-03-10 NOTE — PROGRESS NOTES
Bariatric Nutrition  Note    Phone visit:  i   Name was verified by patient stating name? yes  ii   verified by patient stating? yes  iii  You verified the patient is alone? yes  iv  I would like to verify that you were offered a live visit but are now consenting to this telephone visit? yes  v  This visit is free yes        Insurance: No weight checks required    Type of surgery    Interested in Gastric bypass: laparoscopic   Surgery Date: TBD  Surgeon: Dr Leeanna Jenkins on 2021    Nutrition Assessment   Corinysabel Burrows  24 y o   female     Height: 5'7 5"  Weight: 284 8 ( Recorded from 3/4/2022 Office Visit)        Eval Weight: 296 6#   BMI: 45 8  Wt with BMI of 25: 162#  Pre-Op Excess Wt: 134 6#  BMI to Qualify at 40 = 259#  BMI to Qualify at 34 = 227#  PMH includes: ADHD, hypothyroidism, GERD, BRUCE suspected    Pt advised not to gain weight during preop process  Pt encouraged to lose weight via healthy eating and exercise  Pt may follow Liver Shrinking diet 2 weeks prior to DOS depending on BMI at time  This diet will promote weight loss  not currently breastfeeding      Weight History  Reason for WLS: Has thyroid issues   Onset of Obesity: Childhood  Family history of obesity: Yes Grandfather had surgery   Wt Loss Attempts: Exercise  Self Created Diets (Portion Control, Healthy Food Choices, etc ) - quit drinking soda  Maximum Wt Lost: 5-10# fluctuates    Review of History and Medications   OTC: None  Past Medical History:   Diagnosis Date    ADHD (attention deficit hyperactivity disorder)     Hypothyroid     Morbid obesity with BMI of 45 0-49 9, adult ([facility] [address] )      Past Surgical History:   Procedure Laterality Date    EGD      WISDOM TOOTH EXTRACTION      WRIST SURGERY Right     secondary to a fracture     Social History     Socioeconomic History    Marital status: Single     Spouse name: Not on file    Number of children: Not on file    Years of education: Not on file    Highest education level: Not on file   Occupational History    Not on file   Tobacco Use    Smoking status: Never Smoker    Smokeless tobacco: Never Used   Vaping Use    Vaping Use: Some days    Substances: Nicotine, Flavoring   Substance and Sexual Activity    Alcohol use: Yes     Comment: socially    Drug use: Never    Sexual activity: Yes     Partners: Male     Birth control/protection: OCP   Other Topics Concern    Not on file   Social History Narrative    Not on file     Social Determinants of Health     Financial Resource Strain: Not on file   Food Insecurity: Not on file   Transportation Needs: No Transportation Needs    Lack of Transportation (Medical): No    Lack of Transportation (Non-Medical):  No   Physical Activity: Not on file   Stress: Not on file   Social Connections: Not on file   Intimate Partner Violence: Not on file   Housing Stability: Not on file       Current Outpatient Medications:     amphetamine-dextroamphetamine (ADDERALL XR) 30 MG 24 hr capsule, Take 1 capsule (30 mg total) by mouth every morning Max Daily Amount: 30 mg, Disp: 30 capsule, Rfl: 0    hydrOXYzine pamoate (VISTARIL) 25 mg capsule, Take 1 capsule (25 mg total) by mouth 3 (three) times a day as needed for anxiety, Disp: 60 capsule, Rfl: 0    levothyroxine 75 mcg tablet, Take 1 tablet (75 mcg total) by mouth daily, Disp: 90 tablet, Rfl: 1     Food Intake and Lifestyle Assessment   Food Intake Assessment completed via usual diet recall  Breakfast: Skips Takes thyroid pill and adderall (not hungry)  Lunch: SW and PB and jelly crackers  Dinner: Boyfriends parents - protein only , maybe salad - no starch  Snacks: minimal snacking  Beverage intake: water  Protein supplement: None  Estimated protein intake per day: 75-80grams  Estimated fluid intake per day: 16 oz Water   Meals eaten away from home: Fast food maybe 1X week  Typical meal pattern: 2 meals per day and 0-1 snacks per day  Eating Behaviors: Consumption of high calorie/ high fat foods, Large portion sizes and Emotional eating  Craves chicken  Not mindful of how much eats in a day  Food allergies or intolerances: No Known Allergies or intolerances  Cultural or Jain considerations: None    Physical Assessment  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  Types of exercise: Plays volleyball or other activity on w/e  Current physical limitations: None    Psychosocial Assessment   Support systems: significant other relative(s)  Socioeconomic factors: None    Nutrition Diagnosis  Diagnosis: Overweight / Obesity (NC-3 3)  Related to: Physical inactivity and Excessive energy intake  As Evidenced by: BMI >25     Nutrition Prescription: Recommend the following diet  Low fat, Low sugar, High protein and Regular    Interventions and Teaching   Discussed pre-op and post-op nutrition guidelines  Patient educated and handouts provided    Surgical changes to stomach / GI  Capacity of post-surgery stomach  Diet progression  Adequate hydration  Sugar and fat restriction to decrease "dumping syndrome"  Fat restriction to decrease steatorrhea  Expected weight loss  Weight loss plateaus/ possibility of weight regain  Exercise  Suggestions for pre-op diet  Nutrition considerations after surgery  Protein supplements  Meal planning and preparation  Appropriate carbohydrate, protein, and fat intake, and food/fluid choices to maximize safe weight loss, nutrient intake, and tolerance   Dietary and lifestyle changes  Possible problems with poor eating habits  Intuitive eating  Techniques for self monitoring and keeping daily food journal  Potential for food intolerance after surgery, and ways to deal with them including: lactose intolerance, nausea, reflux, vomiting, diarrhea, food intolerance, appetite changes, gas  Vitamin / Mineral supplementation of Multivitamin with minerals, Calcium, Vitamin B12, Iron and Vitamin D    Education provided to: patient    Barriers to learning: No barriers identified  Readiness to change: preparation    Prior research on procedure: discussed with provider and friends or family    Comprehension: verbalizes understanding     Expected Compliance: good  Recommendations  Pt is an appropriate candidate for surgery  Yes    Evaluation / Monitoring  Dietitian to Monitor: Eating pattern as discussed Tolerance of nutrition prescription Body weight Lab values Physical activity Bowel pattern    Past PreOp Visit Summary:  Making changes but reports to be struggling  Still eating out but selecting salads with chicken  Trying to eat 3 meals but sometimes can't with work  To use protein drinks as meal replacement  To try food logging  Walking more as able - has new puppy  Having difficulty with quitting smoking  Will have to stop next week as having wisdom teeth removed and hopes that will be be the push she needs    Working on other pre-surgery guidelines  PreOp Visit Summary 12/9/2021  Doing good with make changes  Better with 30/60, eating slow and chewing food well  Water intake up to 3-4 bottles  Using protein drink from [employer] "meal replacement" not sure of content but will bring in at next visit  Keep busy and active  Started 2nd job at [address]  Does report to do more walking there  Questions answered during discussion and workflow reviewed  PreOp Visit Summary 3/11/2022  Completed workflow  Decided on bypass vs sleeve, Doing well maintaining changes in diet and activity  At her PCP last week and was noted lost 15#   Practicing guidelines  Using equate as meal replacement at breakfast  Fluids adequate  Active; works 2 jobs - more walking  Discussed next steps of process  Questions answered      Workflow: Completed    Goals  Food journal, Exercise 30 minutes 5 times per week, Complete lession plans 1-6, Eat 3 meals per day and Eliminate mindless snacking   Follow Pre-Surgery guidelines  > Trial Barit[employer] for food logging  > Establish regular meal pattern - include fruits, vegetables and whole grains  > No skipping meals-  Include breakfast meal   > Focus on protein - include lean protein at each meal and snack - Can use protein drink as meal replacement  > Decrease portions  > Limit processed foods, fast foods and dining away from home  > Limit snacks - healthier choices and portion; avoid grazing  > Slow pace of eating and drinking - practice 30/60 minute rule  > Continue to avoid caffeinated and carbonated  Products  > Increase water intake - 64 oz  > Increase physical activity/establish exercise regimen   > Start multi vitamin and additional Vitamin D 2000IU  Work on skills to cope with emotional eating/mindfull eating  F/U next month with SW       Time Spent:    30 minutes

## 2022-03-11 ENCOUNTER — OFFICE VISIT (OUTPATIENT)
Dept: BARIATRICS | Facility: CLINIC | Age: 22
End: 2022-03-11

## 2022-03-11 DIAGNOSIS — E66.01 OBESITY, CLASS III, BMI 40-49.9 (MORBID OBESITY) (HCC): Primary | ICD-10-CM

## 2022-03-11 PROCEDURE — RECHECK

## 2022-03-14 ENCOUNTER — TELEPHONE (OUTPATIENT)
Dept: OBGYN CLINIC | Facility: MEDICAL CENTER | Age: 22
End: 2022-03-14

## 2022-03-14 NOTE — TELEPHONE ENCOUNTER
LVM for patient to call back and change her 3/18/22 12:00 appointment with Dr Nahun Smith to 8:15am the same day  If the morning appointment does not work we will need to reschedule

## 2022-03-23 ENCOUNTER — TELEPHONE (OUTPATIENT)
Dept: BARIATRICS | Facility: CLINIC | Age: 22
End: 2022-03-23

## 2022-03-29 ENCOUNTER — OFFICE VISIT (OUTPATIENT)
Dept: URGENT CARE | Facility: CLINIC | Age: 22
End: 2022-03-29
Payer: COMMERCIAL

## 2022-03-29 VITALS
WEIGHT: 285 LBS | HEART RATE: 92 BPM | RESPIRATION RATE: 18 BRPM | TEMPERATURE: 98 F | BODY MASS INDEX: 44.73 KG/M2 | OXYGEN SATURATION: 98 % | HEIGHT: 67 IN

## 2022-03-29 DIAGNOSIS — S83.412A SPRAIN OF MEDIAL COLLATERAL LIGAMENT OF LEFT KNEE, INITIAL ENCOUNTER: Primary | ICD-10-CM

## 2022-03-29 PROCEDURE — 99213 OFFICE O/P EST LOW 20 MIN: CPT | Performed by: NURSE PRACTITIONER

## 2022-03-29 PROCEDURE — 96372 THER/PROPH/DIAG INJ SC/IM: CPT | Performed by: NURSE PRACTITIONER

## 2022-03-29 RX ORDER — NAPROXEN 500 MG/1
500 TABLET ORAL 2 TIMES DAILY WITH MEALS
Qty: 30 TABLET | Refills: 0 | Status: SHIPPED | OUTPATIENT
Start: 2022-03-29 | End: 2022-04-13

## 2022-03-29 RX ORDER — KETOROLAC TROMETHAMINE 30 MG/ML
30 INJECTION, SOLUTION INTRAMUSCULAR; INTRAVENOUS ONCE
Status: COMPLETED | OUTPATIENT
Start: 2022-03-29 | End: 2022-03-29

## 2022-03-29 RX ADMIN — KETOROLAC TROMETHAMINE 30 MG: 30 INJECTION, SOLUTION INTRAMUSCULAR; INTRAVENOUS at 15:44

## 2022-03-29 NOTE — PATIENT INSTRUCTIONS
Rest, ice often, use the knee brace or ACE for support, elevate at rest     The Toradol (NSAID) shot will last about 8 hours  You may start the naproxen (NSAID) tonight, taking with food  Take it regularly initially, switching to as needed as the pain feels better  Follow up with ortho  Knee Sprain   AMBULATORY CARE:   A knee sprain  is a stretched or torn ligament in your knee  Ligaments support the knee and keep the joint and bones in the correct position  A knee sprain may involve one or more ligaments  Common symptoms include the following:   · Stiffness or decreased movement    · Pain or tenderness    · Painful pop that you can hear or feel    · Swelling or bruising    · Knee that [place] or gives out when you try to walk    Seek care immediately if:   · Any part of your leg feels cold, numb, or looks pale  Call your doctor if:   · You have new or increased swelling, bruising, or pain in your knee  · Your symptoms do not improve within 6 weeks, even with treatment  · You have questions or concerns about your condition or care  Treatment  depends on the type and cause of your knee sprain  You may need any of the following:  · NSAIDs , such as ibuprofen, help decrease swelling, pain, and fever  This medicine is available with or without a doctor's order  NSAIDs can cause stomach bleeding or kidney problems in certain people  If you take blood thinner medicine, always ask your healthcare provider if NSAIDs are safe for you  Always read the medicine label and follow directions  · Acetaminophen  decreases pain and fever  It is available without a doctor's order  Ask how much to take and how often to take it  Follow directions  Read the labels of all other medicines you are using to see if they also contain acetaminophen, or ask your doctor or pharmacist  Acetaminophen can cause liver damage if not taken correctly   Do not use more than 4 grams (4,000 milligrams) total of acetaminophen in one day  · Prescription pain medicine  may be given  Ask your healthcare provider how to take this medicine safely  Some prescription pain medicines contain acetaminophen  Do not take other medicines that contain acetaminophen without talking to your healthcare provider  Too much acetaminophen may cause liver damage  Prescription pain medicine may cause constipation  Ask your healthcare provider how to prevent or treat constipation  · A support device  such as a splint or brace may be needed  These devices limit movement and protect the joint while it heals  You may be given crutches to use until you can stand on your injured leg without pain  · Physical therapy  may be needed  A physical therapist teaches you exercises to help improve movement and strength, and to decrease pain  · Surgery  may be needed if other treatments do not work or your strain is severe  Surgery may include a knee arthroscopy to look inside your knee joint and repair damage  Manage a knee sprain:   · Rest  your knee and do not exercise  Do not walk on your injured leg if you are told to keep weight off your knee  Rest helps decrease swelling and allows the injury to heal  You can do gentle range of motion exercises as directed to prevent stiffness  · Apply ice  on your knee for 15 to 20 minutes every hour or as directed  Use an ice pack, or put crushed ice in a plastic bag  Cover the bag with a towel before you apply it  Ice helps prevent tissue damage and decreases swelling and pain  · Apply compression  to your knee as directed  You may need to wear an elastic bandage  This helps keep your injured knee from moving too much while it heals  It should be tight enough to give support but so tight that it causes your toes to feel numb or tingly  Take the bandage off and rewrap it at least 1 time each day  · Elevate your knee  above the level of your heart as often as you can   This will help decrease swelling and pain  Prop your leg on pillows or blankets to keep it elevated comfortably  Do not put pillows directly behind your knee  Prevent another knee sprain:  Exercise your legs to keep your muscles strong  Strong leg muscles help protect your knee and prevent strain  The following may also prevent a knee sprain:  · Slowly start your exercise or training program   Slowly increase the time, distance, and intensity of your exercise  Sudden increases in training may cause another knee sprain  · Wear protective braces and equipment as directed  Braces may prevent your knee from moving the wrong way and causing another sprain  Protective equipment may support your bones and ligaments to prevent injury  · Warm up and stretch before exercise  Warm up by walking or using an exercise bike before starting your regular exercise  Do gentle stretches after warming up  This helps to loosen your muscles and decrease stress on your knee  Cool down and stretch after you exercise  · Wear shoes that fit correctly and support your feet  Replace your running or exercise shoes before the padding or shock absorption is worn out  Ask your healthcare provider which exercise shoes are best for you  Ask if you should wear shoe inserts  Shoe inserts can help support your heels and arches or keep your foot lined up correctly in your shoes  Exercise on flat surfaces  Follow up with your doctor as directed:  Write down your questions so you remember to ask them during your visits  © Copyright [employer] 2022 Information is for End User's use only and may not be sold, redistributed or otherwise used for commercial purposes  All illustrations and images included in CareNotes® are the copyrighted property of [employer]  or Martha Batres   The above information is an  only  It is not intended as medical advice for individual conditions or treatments   Talk to your doctor, nurse or pharmacist before following any medical regimen to see if it is safe and effective for you

## 2022-03-29 NOTE — PROGRESS NOTES
[facility]        NAME: Agusto Gerard is a 20 y o  female  : 2000    MRN: [id number]  DATE: 2022  TIME: 3:52 PM      Assessment and Plan     Sprain of medial collateral ligament of left knee, initial encounter [C23 412A]  1  Sprain of medial collateral ligament of left knee, initial encounter  ketorolac (TORADOL) injection 30 mg    naproxen (NAPROSYN) 500 mg tablet    CANCELED: Ambulatory Referral to Orthopedic Surgery     Patient fitted for hinged knee brace by nursing, tolerated well  Checked by myself--fit seems good--supportive and secure but not too tight  Patient Instructions   Patient Instructions     Rest, ice often, use the knee brace or ACE for support, elevate at rest     The Toradol (NSAID) shot will last about 8 hours  You may start the naproxen (NSAID) tonight, taking with food  Take it regularly initially, switching to as needed as the pain feels better  Follow up with ortho  Knee Sprain   AMBULATORY CARE:   A knee sprain  is a stretched or torn ligament in your knee  Ligaments support the knee and keep the joint and bones in the correct position  A knee sprain may involve one or more ligaments  Common symptoms include the following:   · Stiffness or decreased movement    · Pain or tenderness    · Painful pop that you can hear or feel    · Swelling or bruising    · Knee that [place] or gives out when you try to walk    Seek care immediately if:   · Any part of your leg feels cold, numb, or looks pale  Call your doctor if:   · You have new or increased swelling, bruising, or pain in your knee  · Your symptoms do not improve within 6 weeks, even with treatment  · You have questions or concerns about your condition or care  Treatment  depends on the type and cause of your knee sprain  You may need any of the following:  · NSAIDs , such as ibuprofen, help decrease swelling, pain, and fever   This medicine is available with or without a doctor's order  NSAIDs can cause stomach bleeding or kidney problems in certain people  If you take blood thinner medicine, always ask your healthcare provider if NSAIDs are safe for you  Always read the medicine label and follow directions  · Acetaminophen  decreases pain and fever  It is available without a doctor's order  Ask how much to take and how often to take it  Follow directions  Read the labels of all other medicines you are using to see if they also contain acetaminophen, or ask your doctor or pharmacist  Acetaminophen can cause liver damage if not taken correctly  Do not use more than 4 grams (4,000 milligrams) total of acetaminophen in one day  · Prescription pain medicine  may be given  Ask your healthcare provider how to take this medicine safely  Some prescription pain medicines contain acetaminophen  Do not take other medicines that contain acetaminophen without talking to your healthcare provider  Too much acetaminophen may cause liver damage  Prescription pain medicine may cause constipation  Ask your healthcare provider how to prevent or treat constipation  · A support device  such as a splint or brace may be needed  These devices limit movement and protect the joint while it heals  You may be given crutches to use until you can stand on your injured leg without pain  · Physical therapy  may be needed  A physical therapist teaches you exercises to help improve movement and strength, and to decrease pain  · Surgery  may be needed if other treatments do not work or your strain is severe  Surgery may include a knee arthroscopy to look inside your knee joint and repair damage  Manage a knee sprain:   · Rest  your knee and do not exercise  Do not walk on your injured leg if you are told to keep weight off your knee  Rest helps decrease swelling and allows the injury to heal  You can do gentle range of motion exercises as directed to prevent stiffness      · Apply ice  on your knee for 15 to 20 minutes every hour or as directed  Use an ice pack, or put crushed ice in a plastic bag  Cover the bag with a towel before you apply it  Ice helps prevent tissue damage and decreases swelling and pain  · Apply compression  to your knee as directed  You may need to wear an elastic bandage  This helps keep your injured knee from moving too much while it heals  It should be tight enough to give support but so tight that it causes your toes to feel numb or tingly  Take the bandage off and rewrap it at least 1 time each day  · Elevate your knee  above the level of your heart as often as you can  This will help decrease swelling and pain  Prop your leg on pillows or blankets to keep it elevated comfortably  Do not put pillows directly behind your knee  Prevent another knee sprain:  Exercise your legs to keep your muscles strong  Strong leg muscles help protect your knee and prevent strain  The following may also prevent a knee sprain:  · Slowly start your exercise or training program   Slowly increase the time, distance, and intensity of your exercise  Sudden increases in training may cause another knee sprain  · Wear protective braces and equipment as directed  Braces may prevent your knee from moving the wrong way and causing another sprain  Protective equipment may support your bones and ligaments to prevent injury  · Warm up and stretch before exercise  Warm up by walking or using an exercise bike before starting your regular exercise  Do gentle stretches after warming up  This helps to loosen your muscles and decrease stress on your knee  Cool down and stretch after you exercise  · Wear shoes that fit correctly and support your feet  Replace your running or exercise shoes before the padding or shock absorption is worn out  Ask your healthcare provider which exercise shoes are best for you  Ask if you should wear shoe inserts   Shoe inserts can help support your heels and arches or keep your foot lined up correctly in your shoes  Exercise on flat surfaces  Follow up with your doctor as directed:  Write down your questions so you remember to ask them during your visits  © Copyright [employer] 2022 Information is for End User's use only and may not be sold, redistributed or otherwise used for commercial purposes  All illustrations and images included in CareNotes® are the copyrighted property of A [employer] Inc  or [employer]   The above information is an  only  It is not intended as medical advice for individual conditions or treatments  Talk to your doctor, nurse or pharmacist before following any medical regimen to see if it is safe and effective for you  Follow up with PCP in 3-5 days  Proceed to  ER if symptoms worsen  Chief Complaint     Chief Complaint   Patient presents with    Knee Pain     Left knee pain no injury started friday pain 2/10 sitting moving 9/10         History of Present Illness     Onset of left knee pain on Friday  No injury  Works at a dental office, seated, doing desk work  Has extensive right knee hx with pain and recent MRI showing a defect; does follow with ortho and sees them next week  No hx of these problems with this knee  Resting makes it feel better  Sometimes straightening her leg or rolling over increases pain, other times it is ok  States the pain is on the medial aspect and feels like a ligament is pulling  No treatments tried yet  Review of Systems     Review of Systems   Musculoskeletal: Positive for arthralgias  Negative for joint swelling  All other systems reviewed and are negative          Current Medications       Current Outpatient Medications:     amphetamine-dextroamphetamine (ADDERALL XR) 30 MG 24 hr capsule, Take 1 capsule (30 mg total) by mouth every morning Max Daily Amount: 30 mg, Disp: 30 capsule, Rfl: 0    hydrOXYzine pamoate (VISTARIL) 25 mg capsule, Take 1 capsule (25 mg total) by mouth 3 (three) times a day as needed for anxiety, Disp: 60 capsule, Rfl: 0    levothyroxine 75 mcg tablet, Take 1 tablet (75 mcg total) by mouth daily, Disp: 90 tablet, Rfl: 1    naproxen (NAPROSYN) 500 mg tablet, Take 1 tablet (500 mg total) by mouth 2 (two) times a day with meals for 15 days, Disp: 30 tablet, Rfl: 0  No current facility-administered medications for this visit  Current Allergies     Allergies as of 03/29/2022    (No Known Allergies)              The following portions of the patient's history were reviewed and updated as appropriate: allergies, current medications, past family history, past medical history, past social history, past surgical history and problem list      Past Medical History:   Diagnosis Date    ADHD (attention deficit hyperactivity disorder)     Hypothyroid     Morbid obesity with BMI of 45 0-49 9, adult ([facility] [address] )        Past Surgical History:   Procedure Laterality Date    EGD      WISDOM TOOTH EXTRACTION      WRIST SURGERY Right     secondary to a fracture       Family History   Problem Relation Age of Onset    Heart disease Paternal Grandfather     Diabetes Paternal Grandfather     Lung cancer Paternal Grandfather     Diabetes Sister         prediabetes    Thyroid disease Paternal Grandmother     Stroke Neg Hx          Medications have been verified  Objective     Pulse 92   Temp 98 °F (36 7 °C)   Resp 18   Ht 5' 7" (1 702 m)   Wt 129 kg (285 lb)   SpO2 98%   BMI 44 64 kg/m²   No LMP recorded  Physical Exam     Physical Exam  Vitals and nursing note reviewed  Constitutional:       General: She is not in acute distress  Appearance: Normal appearance  She is well-developed  She is not ill-appearing, toxic-appearing or diaphoretic  HENT:      Head: Normocephalic and atraumatic  Eyes:      Pupils: Pupils are equal, round, and reactive to light  Pulmonary:      Effort: Pulmonary effort is normal  No respiratory distress     Abdominal: General: There is no distension  Palpations: Abdomen is soft  Musculoskeletal:         General: Tenderness present  No swelling, deformity or signs of injury  Normal range of motion  Cervical back: Normal range of motion and neck supple  Left knee: No swelling, deformity, effusion, erythema, ecchymosis, lacerations, bony tenderness or crepitus  Normal range of motion (but guards/careful since sometimes movement increases pain)  Tenderness present over the medial joint line and MCL  No patellar tendon tenderness  No MCL laxity or PCL laxity  Normal alignment and normal meniscus  Skin:     General: Skin is warm and dry  Capillary Refill: Capillary refill takes less than 2 seconds  Neurological:      Mental Status: She is alert and oriented to person, place, and time  Psychiatric:         Behavior: Behavior normal          Thought Content:  Thought content normal          Judgment: Judgment normal

## 2022-04-01 DIAGNOSIS — F90.0 ATTENTION DEFICIT HYPERACTIVITY DISORDER (ADHD), PREDOMINANTLY INATTENTIVE TYPE: ICD-10-CM

## 2022-04-04 RX ORDER — DEXTROAMPHETAMINE SACCHARATE, AMPHETAMINE ASPARTATE MONOHYDRATE, DEXTROAMPHETAMINE SULFATE AND AMPHETAMINE SULFATE 7.5; 7.5; 7.5; 7.5 MG/1; MG/1; MG/1; MG/1
30 CAPSULE, EXTENDED RELEASE ORAL EVERY MORNING
Qty: 30 CAPSULE | Refills: 0 | Status: SHIPPED | OUTPATIENT
Start: 2022-04-04 | End: 2022-05-10 | Stop reason: SDUPTHER

## 2022-04-11 ENCOUNTER — OFFICE VISIT (OUTPATIENT)
Dept: CARDIOLOGY CLINIC | Facility: CLINIC | Age: 22
End: 2022-04-11
Payer: COMMERCIAL

## 2022-04-11 VITALS
SYSTOLIC BLOOD PRESSURE: 120 MMHG | HEART RATE: 80 BPM | BODY MASS INDEX: 44.42 KG/M2 | OXYGEN SATURATION: 99 % | DIASTOLIC BLOOD PRESSURE: 82 MMHG | WEIGHT: 283 LBS | RESPIRATION RATE: 16 BRPM | HEIGHT: 67 IN

## 2022-04-11 DIAGNOSIS — Z01.810 PREOP CARDIOVASCULAR EXAM: Primary | ICD-10-CM

## 2022-04-11 PROCEDURE — 1036F TOBACCO NON-USER: CPT | Performed by: INTERNAL MEDICINE

## 2022-04-11 PROCEDURE — 93000 ELECTROCARDIOGRAM COMPLETE: CPT | Performed by: INTERNAL MEDICINE

## 2022-04-11 PROCEDURE — 99214 OFFICE O/P EST MOD 30 MIN: CPT | Performed by: INTERNAL MEDICINE

## 2022-04-11 PROCEDURE — 3008F BODY MASS INDEX DOCD: CPT | Performed by: INTERNAL MEDICINE

## 2022-04-11 NOTE — PROGRESS NOTES
Cardiology Follow Up    Mirza Mcgee  2000  [id number]  [address] [facility] [address] [place] [id number] [phone]    Discussion/Summary:  1  Preop cardiovascular risk assessment  2  Bariatric surgery      Patient presents for preoperative cardiovascular risk assessment  Patient  is scheduled for bariatric surgery  Activity level: Patient is very active  She is easily able to complete greater than 4 METS of activity  There is no sign or symptoms of acute coronary syndrome, acute heart failure, valvular heart disease or uncontrolled arrhythmias  Patient is at low risk of perioperative cardiovascular complications based on his age, comorbidities, activity level and nature of upcoming surgery  No further work up would meaningfully change his perioperative risk  FLP on 1/31/22:  , LDL 85, TG 94    Recommend patient presents to the emergency room or call our office if he has any new/persistent or progressive symptoms  Previous studies were reviewed  Safety measures were reviewed  Questions were entertained and answered  Patient was advised to report any problems requiring medical attention  Follow-up with PCP and appropriate specialist and lab work as discussed  Return for follow up visit  if needed  Thank you for allowing me to participate in the care and evaluation of your patient  Should you have any questions, please feel free to contact me  History of Present Illness:     Mirza Mcgee is a 20 y o  female who presents for follow up for cardiovascular care  Denies chest pain, chest pressure, shortness of breath, dizziness, lightheadedness, presyncope or syncope  Denies orthopnea, PND or lower limb edema  She has two flight of stairs at home  Walks up and down with no significant symptoms       Social History:  Tobacco: Stopped vaping, no cigarrettes   Alcohol: Socially     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  Patient Active Problem List   Diagnosis    Preop cardiovascular exam    ADHD (attention deficit hyperactivity disorder)    Morbid obesity with BMI of 45 0-49 9, adult ([facility] )    Acquired hypothyroidism    BRUCE (obstructive sleep apnea)     Past Medical History:   Diagnosis Date    ADHD (attention deficit hyperactivity disorder)     Hypothyroid     Morbid obesity with BMI of 45 0-49 9, adult ([facility] )      Social History     Socioeconomic History    Marital status: Single     Spouse name: Not on file    Number of children: Not on file    Years of education: Not on file    Highest education level: Not on file   Occupational History    Not on file   Tobacco Use    Smoking status: Never Smoker    Smokeless tobacco: Never Used   Vaping Use    Vaping Use: Some days    Substances: Nicotine, Flavoring   Substance and Sexual Activity    Alcohol use: Yes     Comment: socially    Drug use: Never    Sexual activity: Yes     Partners: Male     Birth control/protection: OCP   Other Topics Concern    Not on file   Social History Narrative    Not on file     Social Determinants of Health     Financial Resource Strain: Not on file   Food Insecurity: Not on file   Transportation Needs: No Transportation Needs    Lack of Transportation (Medical): No    Lack of Transportation (Non-Medical):  No   Physical Activity: Not on file   Stress: Not on file   Social Connections: Not on file   Intimate Partner Violence: Not on file   Housing Stability: Not on file      Family History   Problem Relation Age of Onset    Heart disease Paternal Grandfather     Diabetes Paternal Grandfather     Lung cancer Paternal Grandfather     Diabetes Sister         prediabetes    Thyroid disease Paternal Grandmother     Stroke Neg Hx      Past Surgical History:   Procedure Laterality Date    EGD      WISDOM TOOTH EXTRACTION      WRIST SURGERY Right     secondary to a fracture Current Outpatient Medications:     amphetamine-dextroamphetamine (ADDERALL XR) 30 MG 24 hr capsule, Take 1 capsule (30 mg total) by mouth every morning Max Daily Amount: 30 mg, Disp: 30 capsule, Rfl: 0    hydrOXYzine pamoate (VISTARIL) 25 mg capsule, Take 1 capsule (25 mg total) by mouth 3 (three) times a day as needed for anxiety, Disp: 60 capsule, Rfl: 0    levothyroxine 75 mcg tablet, Take 1 tablet (75 mcg total) by mouth daily, Disp: 90 tablet, Rfl: 1    naproxen (NAPROSYN) 500 mg tablet, Take 1 tablet (500 mg total) by mouth 2 (two) times a day with meals for 15 days, Disp: 30 tablet, Rfl: 0  No Known Allergies    Labs:  Lab Results   Component Value Date    WBC 4 86 01/31/2022    HGB 13 0 01/31/2022    HCT 40 0 01/31/2022    MCV 90 01/31/2022     01/31/2022     Lab Results   Component Value Date    CALCIUM 9 2 01/31/2022    K 4 2 01/31/2022    CO2 26 01/31/2022     01/31/2022    BUN 10 01/31/2022    CREATININE 0 81 01/31/2022     Lab Results   Component Value Date    HGBA1C 5 2 01/31/2022     No results found for: CHOL  Lab Results   Component Value Date    HDL 55 01/31/2022    HDL 68 11/23/2020     Lab Results   Component Value Date    LDLCALC 85 01/31/2022    LDLCALC 98 11/23/2020     Lab Results   Component Value Date    TRIG 94 01/31/2022    TRIG 129 11/23/2020     No results found for: CHOLHDL    Review of Systems:  Review of Systems   Constitutional: Negative  Negative for chills, fatigue and fever  HENT: Negative for nosebleeds  Respiratory: Negative for chest tightness, shortness of breath and stridor  Cardiovascular: Negative for chest pain, palpitations and leg swelling  Gastrointestinal: Negative for abdominal pain, nausea and vomiting  Genitourinary: Negative for dysuria  Musculoskeletal: Negative for back pain  Neurological: Negative for dizziness, syncope and weakness  Hematological: Negative for adenopathy     All other systems reviewed and are negative  Physical Exam:  Physical Exam  Vitals and nursing note reviewed  Constitutional:       General: She is not in acute distress  Appearance: Normal appearance  She is not ill-appearing or diaphoretic  HENT:      Head: Normocephalic and atraumatic  Eyes:      Extraocular Movements: Extraocular movements intact  Cardiovascular:      Rate and Rhythm: Normal rate and regular rhythm  Heart sounds: No murmur heard  No friction rub  No gallop  Pulmonary:      Effort: Pulmonary effort is normal  No respiratory distress  Abdominal:      General: There is no distension  Palpations: Abdomen is soft  Musculoskeletal:         General: No swelling  Normal range of motion  Skin:     General: Skin is warm and dry  Capillary Refill: Capillary refill takes less than 2 seconds  Coloration: Skin is not pale  Neurological:      General: No focal deficit present  Mental Status: She is alert and oriented to person, place, and time     Psychiatric:         Mood and Affect: Mood normal

## 2022-04-19 ENCOUNTER — APPOINTMENT (OUTPATIENT)
Dept: LAB | Facility: CLINIC | Age: 22
End: 2022-04-19
Payer: COMMERCIAL

## 2022-04-19 DIAGNOSIS — R79.89 HIGH SERUM THYROID STIMULATING HORMONE (TSH): ICD-10-CM

## 2022-04-19 DIAGNOSIS — E03.9 ACQUIRED HYPOTHYROIDISM: ICD-10-CM

## 2022-04-19 LAB — TSH SERPL DL<=0.05 MIU/L-ACNC: 2.74 UIU/ML (ref 0.45–4.5)

## 2022-04-19 PROCEDURE — 84443 ASSAY THYROID STIM HORMONE: CPT

## 2022-04-19 PROCEDURE — 36415 COLL VENOUS BLD VENIPUNCTURE: CPT

## 2022-04-22 ENCOUNTER — OFFICE VISIT (OUTPATIENT)
Dept: BARIATRICS | Facility: CLINIC | Age: 22
End: 2022-04-22

## 2022-04-22 DIAGNOSIS — Z71.89 ENCOUNTER FOR PRE-BARIATRIC SURGERY COUNSELING AND EDUCATION: Primary | ICD-10-CM

## 2022-04-22 PROCEDURE — RECHECK

## 2022-04-22 NOTE — PROGRESS NOTES
Pre op  Patient got denied by her insurance due to her thyroid being abnormal  Got labs 4/19 and it was normalized  Will notify coordinator  Has been eating 3 meals per day regularly  Drinking adequate water  Has been walking every day when the weather permits  Patient will follow up with RD next month   HC LCSW

## 2022-05-12 ENCOUNTER — OFFICE VISIT (OUTPATIENT)
Dept: FAMILY MEDICINE CLINIC | Facility: CLINIC | Age: 22
End: 2022-05-12
Payer: COMMERCIAL

## 2022-05-12 VITALS
SYSTOLIC BLOOD PRESSURE: 118 MMHG | DIASTOLIC BLOOD PRESSURE: 86 MMHG | TEMPERATURE: 98.2 F | HEART RATE: 81 BPM | WEIGHT: 285 LBS | BODY MASS INDEX: 44.73 KG/M2 | HEIGHT: 67 IN | OXYGEN SATURATION: 98 %

## 2022-05-12 DIAGNOSIS — R09.81 NASAL SINUS CONGESTION: ICD-10-CM

## 2022-05-12 DIAGNOSIS — J02.9 PHARYNGITIS, UNSPECIFIED ETIOLOGY: ICD-10-CM

## 2022-05-12 DIAGNOSIS — F90.8 ATTENTION DEFICIT HYPERACTIVITY DISORDER (ADHD), OTHER TYPE: Primary | ICD-10-CM

## 2022-05-12 PROCEDURE — 1036F TOBACCO NON-USER: CPT | Performed by: PHYSICIAN ASSISTANT

## 2022-05-12 PROCEDURE — 99213 OFFICE O/P EST LOW 20 MIN: CPT | Performed by: PHYSICIAN ASSISTANT

## 2022-05-12 PROCEDURE — 3008F BODY MASS INDEX DOCD: CPT | Performed by: PHYSICIAN ASSISTANT

## 2022-05-12 PROCEDURE — 87070 CULTURE OTHR SPECIMN AEROBIC: CPT | Performed by: PHYSICIAN ASSISTANT

## 2022-05-12 RX ORDER — LORATADINE 10 MG/1
10 TABLET ORAL DAILY
Qty: 30 TABLET | Refills: 0 | Status: SHIPPED | OUTPATIENT
Start: 2022-05-12

## 2022-05-12 RX ORDER — METHYLPREDNISOLONE 4 MG/1
TABLET ORAL
Qty: 21 EACH | Refills: 0 | Status: SHIPPED | OUTPATIENT
Start: 2022-05-12

## 2022-05-12 NOTE — PROGRESS NOTES
Assessment/Plan:      Diagnoses and all orders for this visit:    Attention deficit hyperactivity disorder (ADHD), other type  Comments:  discussed and reviewed the controlled substance agreement together, signed; PDMP reviewed without red flags     Pharyngitis, unspecified etiology  -     methylPREDNISolone 4 MG tablet therapy pack; Use as directed on package  -     loratadine (CLARITIN) 10 mg tablet; Take 1 tablet (10 mg total) by mouth in the morning   -     Throat culture    Nasal sinus congestion  -     loratadine (CLARITIN) 10 mg tablet; Take 1 tablet (10 mg total) by mouth in the morning  Subjective:     Patient ID: Sneha Fishman is a 20 y o  female  Chief Complaint   Patient presents with    Sore Throat     For 1 week     HPI   Patient is a 21 yo female who presents for sore throat x 1 week  She is having nasal/sinus congestion  No hx of seasonal allergies  She is having PND and brings up mucus in the morning  No sick contacts  No cough, sob, LAD  Advil helps the sore throat  Tylenol helped too  No fevers  She is also stable on adderall for known ADHD so updated controlled substance agreement was reviewed and signed together  Review of Systems   Constitutional: Negative for appetite change, diaphoresis and fever  HENT: Positive for congestion, postnasal drip and sore throat  Negative for trouble swallowing (but is painful)  Respiratory: Negative  Cardiovascular: Negative  Psychiatric/Behavioral: Positive for decreased concentration (ADHD well controlled )  Objective:  Vitals:    05/12/22 1234   BP: 118/86   Pulse: 81   Temp: 98 2 °F (36 8 °C)   SpO2: 98%      Physical Exam  Constitutional:       General: She is not in acute distress  Appearance: She is well-developed  She is obese  HENT:      Head: Normocephalic and atraumatic        Right Ear: Tympanic membrane, ear canal and external ear normal       Left Ear: Tympanic membrane, ear canal and external ear normal  Nose: Congestion present  Mouth/Throat:      Mouth: Mucous membranes are moist       Pharynx: Oropharynx is clear  Posterior oropharyngeal erythema present  No oropharyngeal exudate  Eyes:      Conjunctiva/sclera: Conjunctivae normal    Cardiovascular:      Rate and Rhythm: Normal rate and regular rhythm  Heart sounds: No murmur heard  Pulmonary:      Effort: Pulmonary effort is normal  No respiratory distress  Breath sounds: Normal breath sounds  No wheezing  Musculoskeletal:      Cervical back: Normal range of motion and neck supple  Lymphadenopathy:      Cervical: No cervical adenopathy  Skin:     General: Skin is warm and dry  Coloration: Skin is not pale  Findings: No rash  Neurological:      Mental Status: She is alert  Psychiatric:         Mood and Affect: Mood normal          Behavior: Behavior normal          Thought Content:  Thought content normal

## 2022-05-14 LAB — BACTERIA THROAT CULT: NORMAL

## 2022-05-20 ENCOUNTER — OFFICE VISIT (OUTPATIENT)
Dept: BARIATRICS | Facility: CLINIC | Age: 22
End: 2022-05-20

## 2022-05-20 DIAGNOSIS — E66.01 OBESITY, CLASS III, BMI 40-49.9 (MORBID OBESITY) (HCC): Primary | ICD-10-CM

## 2022-05-20 PROCEDURE — RECHECK

## 2022-05-20 NOTE — PROGRESS NOTES
Bariatric Nutrition  Note  Monthly PreOp Visit  Phone visit:  i   Name was verified by patient stating name? yes  ii   verified by patient stating? yes  iii  You verified the patient is alone? yes  iv  I would like to verify that you were offered a live visit but are now consenting to this telephone visit? yes  v  This visit is free yes        Insurance: No weight checks required    Type of surgery    Interested in Gastric bypass: laparoscopic   Surgery Date: TBD  Surgeon: Dr Barb Land on 2021    Nutrition Assessment   Destinee Langley  20 y o   female     Height: 5'7 5"  Weight: 285# ( Recorded from 2022 Office Visit)   BMI: 44 8   Eval Weight: 296 6#   BMI: 45 8  Wt with BMI of 25: 162#  Pre-Op Excess Wt: 134 6#  BMI to Qualify at 40 = 259#  BMI to Qualify at 35 = 227#  PMH includes: ADHD, hypothyroidism, GERD, BRUCE suspected    Pt advised not to gain weight during preop process  Pt encouraged to lose weight via healthy eating and exercise  Pt may follow Liver Shrinking diet 2 weeks prior to DOS depending on BMI at time  This diet will promote weight loss  not currently breastfeeding      Weight History  Reason for WLS: Has thyroid issues   Onset of Obesity: Childhood  Family history of obesity: Yes Grandfather had surgery   Wt Loss Attempts: Exercise  Self Created Diets (Portion Control, Healthy Food Choices, etc ) - quit drinking soda  Maximum Wt Lost: 5-10# fluctuates    Review of History and Medications   OTC: None  Past Medical History:   Diagnosis Date    ADHD (attention deficit hyperactivity disorder)     Hypothyroid     Morbid obesity with BMI of 45 0-49 9, adult ([facility] [address] )      Past Surgical History:   Procedure Laterality Date    EGD      WISDOM TOOTH EXTRACTION      WRIST SURGERY Right     secondary to a fracture     Social History     Socioeconomic History    Marital status: Single     Spouse name: Not on file    Number of children: Not on file    Years of education: Not on file    Highest education level: Not on file   Occupational History    Not on file   Tobacco Use    Smoking status: Never Smoker    Smokeless tobacco: Never Used   Vaping Use    Vaping Use: Some days    Substances: Nicotine, Flavoring   Substance and Sexual Activity    Alcohol use: Yes     Comment: socially    Drug use: Never    Sexual activity: Yes     Partners: Male     Birth control/protection: OCP   Other Topics Concern    Not on file   Social History Narrative    Not on file     Social Determinants of Health     Financial Resource Strain: Not on file   Food Insecurity: Not on file   Transportation Needs: Not on file   Physical Activity: Not on file   Stress: Not on file   Social Connections: Not on file   Intimate Partner Violence: Not on file   Housing Stability: Not on file       Current Outpatient Medications:     amphetamine-dextroamphetamine (ADDERALL XR) 30 MG 24 hr capsule, Take 1 capsule (30 mg total) by mouth every morning Max Daily Amount: 30 mg, Disp: 30 capsule, Rfl: 0    hydrOXYzine pamoate (VISTARIL) 25 mg capsule, Take 1 capsule (25 mg total) by mouth 3 (three) times a day as needed for anxiety, Disp: 60 capsule, Rfl: 0    levothyroxine 75 mcg tablet, Take 1 tablet (75 mcg total) by mouth daily, Disp: 90 tablet, Rfl: 1    loratadine (CLARITIN) 10 mg tablet, Take 1 tablet (10 mg total) by mouth in the morning , Disp: 30 tablet, Rfl: 0    methylPREDNISolone 4 MG tablet therapy pack, Use as directed on package, Disp: 21 each, Rfl: 0    naproxen (NAPROSYN) 500 mg tablet, Take 1 tablet (500 mg total) by mouth 2 (two) times a day with meals for 15 days, Disp: 30 tablet, Rfl: 0     Food Intake and Lifestyle Assessment   Food Intake Assessment completed via usual diet recall  Breakfast: Skips Takes thyroid pill and adderall (not hungry)  Lunch: SW and PB and jelly crackers  Dinner: Boyfriends parents - protein only , maybe salad - no starch  Snacks: minimal snacking  Beverage intake: water  Protein supplement: None  Estimated protein intake per day: 75-80grams  Estimated fluid intake per day: 16 oz Water   Meals eaten away from home: Fast food maybe 1X week  Typical meal pattern: 2 meals per day and 0-1 snacks per day  Eating Behaviors: Consumption of high calorie/ high fat foods, Large portion sizes and Emotional eating  Craves chicken  Not mindful of how much eats in a day  Food allergies or intolerances: No Known Allergies or intolerances  Cultural or Yazdanism considerations: None    Physical Assessment  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  Types of exercise: Plays volleyball or other activity on w/e  Current physical limitations: None    Psychosocial Assessment   Support systems: significant other relative(s)  Socioeconomic factors: None    Nutrition Diagnosis  Diagnosis: Overweight / Obesity (NC-3 3)  Related to: Physical inactivity and Excessive energy intake  As Evidenced by: BMI >25     Nutrition Prescription: Recommend the following diet  Low fat, Low sugar, High protein and Regular    Interventions and Teaching   Discussed pre-op and post-op nutrition guidelines  Patient educated and handouts provided    Surgical changes to stomach / GI  Capacity of post-surgery stomach  Diet progression  Adequate hydration  Sugar and fat restriction to decrease "dumping syndrome"  Fat restriction to decrease steatorrhea  Expected weight loss  Weight loss plateaus/ possibility of weight regain  Exercise  Suggestions for pre-op diet  Nutrition considerations after surgery  Protein supplements  Meal planning and preparation  Appropriate carbohydrate, protein, and fat intake, and food/fluid choices to maximize safe weight loss, nutrient intake, and tolerance   Dietary and lifestyle changes  Possible problems with poor eating habits  Intuitive eating  Techniques for self monitoring and keeping daily food journal  Potential for food intolerance after surgery, and ways to deal with them including: lactose intolerance, nausea, reflux, vomiting, diarrhea, food intolerance, appetite changes, gas  Vitamin / Mineral supplementation of Multivitamin with minerals, Calcium, Vitamin B12, Iron and Vitamin D    Education provided to: patient    Barriers to learning: No barriers identified  Readiness to change: preparation    Prior research on procedure: discussed with provider and friends or family    Comprehension: verbalizes understanding     Expected Compliance: good  Recommendations  Pt is an appropriate candidate for surgery  Yes    Evaluation / Monitoring  Dietitian to Monitor: Eating pattern as discussed Tolerance of nutrition prescription Body weight Lab values Physical activity Bowel pattern    Past PreOp Visit Summary:  Making changes but reports to be struggling  Still eating out but selecting salads with chicken  Trying to eat 3 meals but sometimes can't with work  To use protein drinks as meal replacement  To try food logging  Walking more as able - has new puppy  Having difficulty with quitting smoking  Will have to stop next week as having wisdom teeth removed and hopes that will be be the push she needs    Working on other pre-surgery guidelines  PreOp Visit Summary 12/9/2021  Doing good with make changes  Better with 30/60, eating slow and chewing food well  Water intake up to 3-4 bottles  Using protein drink from [employer] "meal replacement" not sure of content but will bring in at next visit  Keep busy and active  Started 2nd job at [address]  Does report to do more walking there  Questions answered during discussion and workflow reviewed  PreOp Visit Summary 3/11/2022  Completed workflow  Decided on bypass vs sleeve, Doing well maintaining changes in diet and activity  At her PCP last week and was noted lost 15#   Practicing guidelines  Using equate as meal replacement at breakfast  Fluids adequate  Active; works 2 jobs - more walking   Discussed next steps of process  Questions answered  Workflow: Completed    Preop Visit Summary 5/20/2022  Surgery approved and  planning on having surgery in August - Thinking now more of doing the sleeve  Vs RNY- may want to meet with Dr Siri Gomez as last consult in 5/2021  Also discussed need for updated labs before her surgery  Pt receptive  Pt working on guidelines (30/60 & eating slower)  Hydration good -  drinks mainly water;  Has increased activity - taking walks and doing more as weather improves    Continues to use meal replacement at breakfast and making healthier choices       Goals  Food journal, Exercise 30 minutes 5 times per week, Complete lession plans 1-6, Eat 3 meals per day and Eliminate mindless snacking   Follow Pre-Surgery guidelines  > Trial Baritastic for food logging  > Establish regular meal pattern - include fruits, vegetables and whole grains  > No skipping meals-  Include breakfast meal   > Focus on protein - include lean protein at each meal and snack - Can use protein drink as meal replacement  > Decrease portions  > Limit processed foods, fast foods and dining away from home  > Limit snacks - healthier choices and portion; avoid grazing  > Slow pace of eating and drinking - practice 30/60 minute rule  > Continue to avoid caffeinated and carbonated  Products  > Increase water intake - 64 oz  > Increase physical activity/establish exercise regimen   > Start multi vitamin and additional Vitamin D 2000IU  Work on skills to cope with emotional eating/mindfull eating  F/U next month with SW       Time Spent:    30 minutes

## 2022-06-13 DIAGNOSIS — F90.0 ATTENTION DEFICIT HYPERACTIVITY DISORDER (ADHD), PREDOMINANTLY INATTENTIVE TYPE: ICD-10-CM

## 2022-06-13 RX ORDER — DEXTROAMPHETAMINE SACCHARATE, AMPHETAMINE ASPARTATE MONOHYDRATE, DEXTROAMPHETAMINE SULFATE AND AMPHETAMINE SULFATE 7.5; 7.5; 7.5; 7.5 MG/1; MG/1; MG/1; MG/1
30 CAPSULE, EXTENDED RELEASE ORAL EVERY MORNING
Qty: 30 CAPSULE | Refills: 0 | Status: SHIPPED | OUTPATIENT
Start: 2022-06-13 | End: 2022-07-18 | Stop reason: SDUPTHER

## 2022-07-08 ENCOUNTER — TELEPHONE (OUTPATIENT)
Dept: BARIATRICS | Facility: CLINIC | Age: 22
End: 2022-07-08

## 2022-07-11 NOTE — TELEPHONE ENCOUNTER
Called pt on Thursday 07/07/2022 spoke to her she was at work   She stated she would call me back  However I did not hear back from her on Friday  I will reach out to her sometime today

## 2022-07-18 DIAGNOSIS — F90.0 ATTENTION DEFICIT HYPERACTIVITY DISORDER (ADHD), PREDOMINANTLY INATTENTIVE TYPE: ICD-10-CM

## 2022-07-18 RX ORDER — DEXTROAMPHETAMINE SACCHARATE, AMPHETAMINE ASPARTATE MONOHYDRATE, DEXTROAMPHETAMINE SULFATE AND AMPHETAMINE SULFATE 7.5; 7.5; 7.5; 7.5 MG/1; MG/1; MG/1; MG/1
30 CAPSULE, EXTENDED RELEASE ORAL EVERY MORNING
Qty: 30 CAPSULE | Refills: 0 | Status: SHIPPED | OUTPATIENT
Start: 2022-07-18 | End: 2022-08-29 | Stop reason: SDUPTHER

## 2022-07-22 ENCOUNTER — PREP FOR PROCEDURE (OUTPATIENT)
Dept: BARIATRICS | Facility: CLINIC | Age: 22
End: 2022-07-22

## 2022-07-22 DIAGNOSIS — E66.01 MORBID OBESITY (HCC): Primary | ICD-10-CM

## 2022-07-22 DIAGNOSIS — G47.33 OBSTRUCTIVE SLEEP APNEA (ADULT) (PEDIATRIC): ICD-10-CM

## 2022-08-18 ENCOUNTER — OFFICE VISIT (OUTPATIENT)
Dept: BARIATRICS | Facility: CLINIC | Age: 22
End: 2022-08-18

## 2022-08-18 DIAGNOSIS — E66.01 OBESITY, CLASS III, BMI 40-49.9 (MORBID OBESITY) (HCC): Primary | ICD-10-CM

## 2022-08-18 PROCEDURE — RECHECK

## 2022-08-18 NOTE — PROGRESS NOTES
Weight Management Nutrition Class     Diagnosis: Obesity    Bariatric Surgeon: Dr France Lancaster     Surgery: Vertical Sleeve Gastrectomy    Class: Pt attended pre-op education session  Standardized packet of information for bariatric surgery was sent via email and was reviewed with pt  Importance of lifestyle change and development of regular exercise routine stressed  Pt given the opportunity to ask questions  Ensure pre-surgery drink instructions were given  Questions were answered  Pt verbalized understanding of all information provided  Pt appeared prepared for upcoming surgery  Pt  educated on two-week pre operative liver shrinking diet  Pt understands that the diet needs to be followed for 2 weeks prior to surgery  Handout reviewed  Emphasized the need to drink 80 ounces of fluid per day while on the diet  Reviewed pre-op ERAS drink, post-operative clear liquid, full liquid, and pureed diet, post-operative nutrition rules and facts, and post-operative bariatric multivitamin/mineral recommendations and brand comparison  Contact information provided for any questions/concerns

## 2022-09-06 RX ORDER — MULTIVITAMIN
1 CAPSULE ORAL DAILY
COMMUNITY

## 2022-09-06 NOTE — PRE-PROCEDURE INSTRUCTIONS
Pre-Surgery Instructions:   Medication Instructions    amphetamine-dextroamphetamine (ADDERALL XR) 30 MG 24 hr capsule Hold day of surgery   hydrOXYzine pamoate (VISTARIL) 25 mg capsule Uses PRN- OK to take day of surgery    levothyroxine 75 mcg tablet Take day of surgery   Multiple Vitamin (multivitamin) capsule Stop taking 7 days prior to surgery  NPO after midnight, meds in am with sips of water  Has instructions for 3 carb drinks to take prior to surgery Understands when to expect preop phone call  Remove all jewelry  Advised of visitation policy  Before your operation, you play an important role in decreasing your risk for infection by washing with special antiseptic soap  This is an effective way to reduce bacteria on the skin which may help to prevent infections at the surgical site  Please read the following directions in advance  1  In the week before your operation purchase a 4 ounce bottle of antiseptic soap containing chlorhexidine gluconate 4%  Some brand names include: Aplicare, Endure, and Hibiclens  The cost is usually less than $5 00  · For your convenience, the [address] [place] [address] carries the soap  · It may also be available at your doctor's office or pre-admission testing center, and at most retail pharmacies  · If you are allergic or sensitive to soaps containing chlorhexidine gluconate (CHG), please let your doctor know so another antiseptic soap can be suggested  · CHG antiseptic soap is for external use only  2      The day before your operation follow these directions carefully to get ready  · Place clean lines (sheets) on your bed; you should sleep on clean sheets after your evening shower  · Get clean towels and washcloths ready - you need enough for 2 showers  · Set aside clean underwear, pajamas, and clothing to wear after the shower  Reminders:  · DO NOT use any other soap or body rinse on your skin during or after the antiseptic showers    · DO NOT use lotion , powder, deodorant, or perfume/aftershave of any kind on your skin after your antiseptic shower  · DO NOT shave any body parts in the 24 hours/the day before your operation  · DO NOT get the antiseptic soap in your eyes, ears, nose, mouth, or vaginal area  3      You will need to shower the night before AND the morning of your Surgery  Shower 1:  · The evening before your operation, take the fist shower  · First, shampoo your hair with regular shampoo and rinse it completely before you use the anitseptic soap  After washing and rinsing your hair, rinse your body  · Next, use a clean wash cloth to apply the antiseptic soap and wash your body from the neck down to your toes using 1/2 bottle of the antiseptic soap  You will use the other 1/2 bottle for the second shower  · Clean the area where your incision will be; later this area well for about 2 minutes  · If you ar having head or neck surgery, wash areas with the antiseptic soap  · Rinse yourself completely with running water  · Use a clean towel to dry off  · Wear clean underwear and clothing/pajamas  Shower 2:  · The Morning of your operation, take the second shower following the same steps as Shower 1 using the second 1/2 of the bottle of antiseptic soap  · Use clean cloths and towels to was and dry yourself off  · Wear clean underwear and clothing

## 2022-09-08 ENCOUNTER — TELEPHONE (OUTPATIENT)
Dept: BARIATRICS | Facility: CLINIC | Age: 22
End: 2022-09-08

## 2022-09-09 ENCOUNTER — OFFICE VISIT (OUTPATIENT)
Dept: BARIATRICS | Facility: CLINIC | Age: 22
End: 2022-09-09
Payer: COMMERCIAL

## 2022-09-09 VITALS
HEART RATE: 95 BPM | BODY MASS INDEX: 43.68 KG/M2 | SYSTOLIC BLOOD PRESSURE: 122 MMHG | TEMPERATURE: 99.1 F | RESPIRATION RATE: 14 BRPM | DIASTOLIC BLOOD PRESSURE: 80 MMHG | WEIGHT: 278.3 LBS | HEIGHT: 67 IN

## 2022-09-09 DIAGNOSIS — Z98.84 BARIATRIC SURGERY STATUS: Primary | ICD-10-CM

## 2022-09-09 DIAGNOSIS — G47.33 OSA (OBSTRUCTIVE SLEEP APNEA): Primary | ICD-10-CM

## 2022-09-09 PROCEDURE — 99213 OFFICE O/P EST LOW 20 MIN: CPT | Performed by: SURGERY

## 2022-09-09 RX ORDER — ENOXAPARIN SODIUM 300 MG/3ML
40 INJECTION INTRAVENOUS; SUBCUTANEOUS
Status: CANCELLED | OUTPATIENT
Start: 2022-09-10 | End: 2022-09-11

## 2022-09-09 RX ORDER — ACETAMINOPHEN 325 MG/1
975 TABLET ORAL ONCE
Status: CANCELLED | OUTPATIENT
Start: 2022-09-09 | End: 2022-09-09

## 2022-09-09 RX ORDER — GABAPENTIN 100 MG/1
600 CAPSULE ORAL ONCE
Status: CANCELLED | OUTPATIENT
Start: 2022-09-09 | End: 2022-09-09

## 2022-09-09 RX ORDER — ENOXAPARIN SODIUM 100 MG/ML
0.4 INJECTION SUBCUTANEOUS DAILY
Qty: 5.2 ML | Refills: 0 | Status: SHIPPED | OUTPATIENT
Start: 2022-09-09 | End: 2022-09-22

## 2022-09-09 RX ORDER — OXYCODONE HYDROCHLORIDE 5 MG/1
5 TABLET ORAL EVERY 4 HOURS PRN
Qty: 10 TABLET | Refills: 0 | Status: SHIPPED | OUTPATIENT
Start: 2022-09-09

## 2022-09-09 RX ORDER — CELECOXIB 200 MG/1
200 CAPSULE ORAL ONCE
Status: CANCELLED | OUTPATIENT
Start: 2022-09-09 | End: 2022-09-09

## 2022-09-09 RX ORDER — SCOLOPAMINE TRANSDERMAL SYSTEM 1 MG/1
1 PATCH, EXTENDED RELEASE TRANSDERMAL ONCE
Status: CANCELLED | OUTPATIENT
Start: 2022-09-09 | End: 2022-09-09

## 2022-09-09 RX ORDER — PANTOPRAZOLE SODIUM 20 MG/1
40 TABLET, DELAYED RELEASE ORAL DAILY
Qty: 90 TABLET | Refills: 1 | Status: SHIPPED | OUTPATIENT
Start: 2022-09-09

## 2022-09-09 NOTE — H&P
H&P Exam - Bariatric Surgery   Carlos Rivera 20 y o  female MRN: [id number]   Encounter: [id number]      HPI:    20 y o  yo morbidly obese female found to be a good candidate to undergo a weight loss operation upon being enrolled here at the [facility]   She has been pre certified to undergo a Laparoscopic sleeve gastrectomy  I have discussed with the patient that a percentage of patient's may experience new or worsened GERD and 18% risk of Alcala's esophagitis requiring surveillance EGDs after a sleeve gastrectomy  The patient understands this fully and accepts the risks to undergo a sleeve gastrectomy  ++Suffers from ADHD, hypothyroidism, GERD, BRUCE suspected  **OCPs - instructed on 1 month before/after surgery to hold    Here today to review her pre op test results  Has been medically cleared for the procedure  I have explained our Enhanced Recovery After Bariatric Surgery (ERABS) protocol and benefits including preoperative, intraoperative and postoperative elements  I have discussed with her at length the risks and benefits of the operation and reiterated the components of our multidisciplinary program and the importance of compliance and follow up in the post operative period  Although there is a great statistical chance of improvement or even resolution of most of her associated comorbidities, the results vary from patient to patient and they largely depend on his commitment  The patient was also instructed with regards to the importance of behavior modification, nutritional counseling, support meeting attendance and lifestyle changes that are important to ensure success  She was given the opportunity to ask questions and I have answered all of them  I have addressed with the patient the level of CODE STATUS for this hospital stay and after explaining the different options currently she wishes to be a Level I  She understands and wishes to proceed      She has lost all the weight required prior to surgery  Review of Systems   Constitutional: Negative  Respiratory: Negative  Cardiovascular: Negative  Gastrointestinal: Negative  Musculoskeletal: Negative  Neurological: Negative  All other systems reviewed and are negative  Historical Information   Past Medical History:   Diagnosis Date    ADHD (attention deficit hyperactivity disorder)     Hypothyroid     Morbid obesity with BMI of 45 0-49 9, adult ([facility] [address] )      Past Surgical History:   Procedure Laterality Date    EGD      WISDOM TOOTH EXTRACTION      WRIST SURGERY Right     secondary to a fracture     Social History   Social History     Substance and Sexual Activity   Alcohol Use Yes    Comment: 1-2 on weekends     Social History     Substance and Sexual Activity   Drug Use Never     Social History     Tobacco Use   Smoking Status Never Smoker   Smokeless Tobacco Never Used     Family History:   Family History   Problem Relation Age of Onset    Heart disease Paternal Grandfather     Diabetes Paternal Grandfather     Lung cancer Paternal Grandfather     Diabetes Sister         prediabetes    Thyroid disease Paternal Grandmother     Stroke Neg Hx        Meds/Allergies   all medications and allergies reviewed  No Known Allergies    Objective     Current Vitals:   Blood Pressure: 118/86 (09/09/22 1327)  Pulse: 95 (09/09/22 1327)  Temperature: 99 1 °F (37 3 °C) (09/09/22 1327)  Temp Source: Tympanic (09/09/22 1327)  Respirations: 14 (09/09/22 1327)  Height: 5' 7" (170 2 cm) (09/09/22 1327)  Weight - Scale: 126 kg (278 lb 4 8 oz) (09/09/22 1327)        Physical Exam  Vitals reviewed  Constitutional:       Appearance: She is well-developed  HENT:      Head: Normocephalic  Eyes:      Extraocular Movements: Extraocular movements intact  Cardiovascular:      Rate and Rhythm: Normal rate  Heart sounds: Normal heart sounds     Pulmonary:      Effort: Pulmonary effort is normal  Breath sounds: Normal breath sounds  Abdominal:      General: There is no distension  Palpations: Abdomen is soft  Musculoskeletal:         General: Normal range of motion  Cervical back: Normal range of motion  Skin:     General: Skin is warm and dry  Neurological:      Mental Status: She is alert and oriented to person, place, and time  Psychiatric:         Mood and Affect: Mood normal          Behavior: Behavior normal          Thought Content: Thought content normal          Judgment: Judgment normal          Lab Results: I have personally reviewed pertinent lab results  Imaging: I have personally reviewed pertinent reports  EKG, Pathology, and Other Studies: I have personally reviewed pertinent reports  Code Status: Level 1    Assessment:  20 y o  yo morbidly obese female found to be a good candidate to undergo a weight loss operation upon being enrolled here at the [address] [place] [address]  She has completed all of the preoperative process for bariatric surgery  Plan:  - Plan for laparoscopic possible open SG with intraoperative EGD  - I discussed the risk of rescheduling/canceling the case if goal weight not met    - consent signed  - preop orders placed

## 2022-09-09 NOTE — PROGRESS NOTES
H&P Exam - Bariatric Surgery   Ramírez Bradley 20 y o  female MRN: [id number]   Encounter: [id number]      HPI:    20 y o  yo morbidly obese female found to be a good candidate to undergo a weight loss operation upon being enrolled here at the [facility]   She has been pre certified to undergo a Laparoscopic sleeve gastrectomy  I have discussed with the patient that a percentage of patient's may experience new or worsened GERD and 18% risk of Alcala's esophagitis requiring surveillance EGDs after a sleeve gastrectomy  The patient understands this fully and accepts the risks to undergo a sleeve gastrectomy  ++Suffers from ADHD, hypothyroidism, GERD, BRUCE suspected  **OCPs - instructed on 1 month before/after surgery to hold    Here today to review her pre op test results  Has been medically cleared for the procedure  I have explained our Enhanced Recovery After Bariatric Surgery (ERABS) protocol and benefits including preoperative, intraoperative and postoperative elements  I have discussed with her at length the risks and benefits of the operation and reiterated the components of our multidisciplinary program and the importance of compliance and follow up in the post operative period  Although there is a great statistical chance of improvement or even resolution of most of her associated comorbidities, the results vary from patient to patient and they largely depend on his commitment  The patient was also instructed with regards to the importance of behavior modification, nutritional counseling, support meeting attendance and lifestyle changes that are important to ensure success  She was given the opportunity to ask questions and I have answered all of them  I have addressed with the patient the level of CODE STATUS for this hospital stay and after explaining the different options currently she wishes to be a Level I  She understands and wishes to proceed      She has lost all the weight required prior to surgery  Review of Systems   Constitutional: Negative  Respiratory: Negative  Cardiovascular: Negative  Gastrointestinal: Negative  Musculoskeletal: Negative  Neurological: Negative  All other systems reviewed and are negative  Historical Information   Past Medical History:   Diagnosis Date    ADHD (attention deficit hyperactivity disorder)     Hypothyroid     Morbid obesity with BMI of 45 0-49 9, adult ([facility] [address] )      Past Surgical History:   Procedure Laterality Date    EGD      WISDOM TOOTH EXTRACTION      WRIST SURGERY Right     secondary to a fracture     Social History   Social History     Substance and Sexual Activity   Alcohol Use Yes    Comment: 1-2 on weekends     Social History     Substance and Sexual Activity   Drug Use Never     Social History     Tobacco Use   Smoking Status Never Smoker   Smokeless Tobacco Never Used     Family History:   Family History   Problem Relation Age of Onset    Heart disease Paternal Grandfather     Diabetes Paternal Grandfather     Lung cancer Paternal Grandfather     Diabetes Sister         prediabetes    Thyroid disease Paternal Grandmother     Stroke Neg Hx        Meds/Allergies   all medications and allergies reviewed  No Known Allergies    Objective     Current Vitals:   Blood Pressure: 118/86 (09/09/22 1327)  Pulse: 95 (09/09/22 1327)  Temperature: 99 1 °F (37 3 °C) (09/09/22 1327)  Temp Source: Tympanic (09/09/22 1327)  Respirations: 14 (09/09/22 1327)  Height: 5' 7" (170 2 cm) (09/09/22 1327)  Weight - Scale: 126 kg (278 lb 4 8 oz) (09/09/22 1327)        Physical Exam  Vitals reviewed  Constitutional:       Appearance: She is well-developed  HENT:      Head: Normocephalic  Eyes:      Extraocular Movements: Extraocular movements intact  Cardiovascular:      Rate and Rhythm: Normal rate  Heart sounds: Normal heart sounds     Pulmonary:      Effort: Pulmonary effort is normal  Breath sounds: Normal breath sounds  Abdominal:      General: There is no distension  Palpations: Abdomen is soft  Musculoskeletal:         General: Normal range of motion  Cervical back: Normal range of motion  Skin:     General: Skin is warm and dry  Neurological:      Mental Status: She is alert and oriented to person, place, and time  Psychiatric:         Mood and Affect: Mood normal          Behavior: Behavior normal          Thought Content: Thought content normal          Judgment: Judgment normal          Lab Results: I have personally reviewed pertinent lab results  Imaging: I have personally reviewed pertinent reports  EKG, Pathology, and Other Studies: I have personally reviewed pertinent reports  Code Status: Level 1    Assessment:  20 y o  yo morbidly obese female found to be a good candidate to undergo a weight loss operation upon being enrolled here at the [address] [place] [address]  She has completed all of the preoperative process for bariatric surgery  Plan:  - Plan for laparoscopic possible open SG with intraoperative EGD  - I discussed the risk of rescheduling/canceling the case if goal weight not met    - consent signed  - preop orders placed

## 2022-09-09 NOTE — H&P (VIEW-ONLY)
H&P Exam - Bariatric Surgery   Opal Cox 20 y o  female MRN: [id number]   Encounter: [id number]      HPI:    20 y o  yo morbidly obese female found to be a good candidate to undergo a weight loss operation upon being enrolled here at the [facility]   She has been pre certified to undergo a Laparoscopic sleeve gastrectomy  I have discussed with the patient that a percentage of patient's may experience new or worsened GERD and 18% risk of Alcala's esophagitis requiring surveillance EGDs after a sleeve gastrectomy  The patient understands this fully and accepts the risks to undergo a sleeve gastrectomy  ++Suffers from ADHD, hypothyroidism, GERD, BRUCE suspected  **OCPs - instructed on 1 month before/after surgery to hold    Here today to review her pre op test results  Has been medically cleared for the procedure  I have explained our Enhanced Recovery After Bariatric Surgery (ERABS) protocol and benefits including preoperative, intraoperative and postoperative elements  I have discussed with her at length the risks and benefits of the operation and reiterated the components of our multidisciplinary program and the importance of compliance and follow up in the post operative period  Although there is a great statistical chance of improvement or even resolution of most of her associated comorbidities, the results vary from patient to patient and they largely depend on his commitment  The patient was also instructed with regards to the importance of behavior modification, nutritional counseling, support meeting attendance and lifestyle changes that are important to ensure success  She was given the opportunity to ask questions and I have answered all of them  I have addressed with the patient the level of CODE STATUS for this hospital stay and after explaining the different options currently she wishes to be a Level I  She understands and wishes to proceed      She has lost all the weight required prior to surgery  Review of Systems   Constitutional: Negative  Respiratory: Negative  Cardiovascular: Negative  Gastrointestinal: Negative  Musculoskeletal: Negative  Neurological: Negative  All other systems reviewed and are negative  Historical Information   Past Medical History:   Diagnosis Date    ADHD (attention deficit hyperactivity disorder)     Hypothyroid     Morbid obesity with BMI of 45 0-49 9, adult ([facility] [address] )      Past Surgical History:   Procedure Laterality Date    EGD      WISDOM TOOTH EXTRACTION      WRIST SURGERY Right     secondary to a fracture     Social History   Social History     Substance and Sexual Activity   Alcohol Use Yes    Comment: 1-2 on weekends     Social History     Substance and Sexual Activity   Drug Use Never     Social History     Tobacco Use   Smoking Status Never Smoker   Smokeless Tobacco Never Used     Family History:   Family History   Problem Relation Age of Onset    Heart disease Paternal Grandfather     Diabetes Paternal Grandfather     Lung cancer Paternal Grandfather     Diabetes Sister         prediabetes    Thyroid disease Paternal Grandmother     Stroke Neg Hx        Meds/Allergies   all medications and allergies reviewed  No Known Allergies    Objective     Current Vitals:   Blood Pressure: 118/86 (09/09/22 1327)  Pulse: 95 (09/09/22 1327)  Temperature: 99 1 °F (37 3 °C) (09/09/22 1327)  Temp Source: Tympanic (09/09/22 1327)  Respirations: 14 (09/09/22 1327)  Height: 5' 7" (170 2 cm) (09/09/22 1327)  Weight - Scale: 126 kg (278 lb 4 8 oz) (09/09/22 1327)        Physical Exam  Vitals reviewed  Constitutional:       Appearance: She is well-developed  HENT:      Head: Normocephalic  Eyes:      Extraocular Movements: Extraocular movements intact  Cardiovascular:      Rate and Rhythm: Normal rate  Heart sounds: Normal heart sounds     Pulmonary:      Effort: Pulmonary effort is normal  Breath sounds: Normal breath sounds  Abdominal:      General: There is no distension  Palpations: Abdomen is soft  Musculoskeletal:         General: Normal range of motion  Cervical back: Normal range of motion  Skin:     General: Skin is warm and dry  Neurological:      Mental Status: She is alert and oriented to person, place, and time  Psychiatric:         Mood and Affect: Mood normal          Behavior: Behavior normal          Thought Content: Thought content normal          Judgment: Judgment normal          Lab Results: I have personally reviewed pertinent lab results  Imaging: I have personally reviewed pertinent reports  EKG, Pathology, and Other Studies: I have personally reviewed pertinent reports  Code Status: Level 1    Assessment:  20 y o  yo morbidly obese female found to be a good candidate to undergo a weight loss operation upon being enrolled here at the [address] [place] [address]  She has completed all of the preoperative process for bariatric surgery  Plan:  - Plan for laparoscopic possible open SG with intraoperative EGD  - I discussed the risk of rescheduling/canceling the case if goal weight not met    - consent signed  - preop orders placed

## 2022-09-12 ENCOUNTER — HOSPITAL ENCOUNTER (INPATIENT)
Facility: HOSPITAL | Age: 22
LOS: 1 days | Discharge: HOME | DRG: 621 | End: 2022-09-13
Attending: SURGERY | Admitting: SURGERY
Payer: COMMERCIAL

## 2022-09-12 ENCOUNTER — ANESTHESIA (OUTPATIENT)
Dept: PERIOP | Facility: HOSPITAL | Age: 22
DRG: 621 | End: 2022-09-12
Payer: COMMERCIAL

## 2022-09-12 ENCOUNTER — ANESTHESIA EVENT (OUTPATIENT)
Dept: PERIOP | Facility: HOSPITAL | Age: 22
DRG: 621 | End: 2022-09-12
Payer: COMMERCIAL

## 2022-09-12 DIAGNOSIS — G47.33 OBSTRUCTIVE SLEEP APNEA (ADULT) (PEDIATRIC): ICD-10-CM

## 2022-09-12 DIAGNOSIS — R11.0 NAUSEA: Primary | ICD-10-CM

## 2022-09-12 DIAGNOSIS — Z98.84 BARIATRIC SURGERY STATUS: ICD-10-CM

## 2022-09-12 DIAGNOSIS — E66.01 MORBID OBESITY (HCC): ICD-10-CM

## 2022-09-12 LAB
EXT PREGNANCY TEST URINE: NEGATIVE
EXT. CONTROL: NORMAL

## 2022-09-12 PROCEDURE — 43775 LAP SLEEVE GASTRECTOMY: CPT | Performed by: SURGERY

## 2022-09-12 PROCEDURE — C1781 MESH (IMPLANTABLE): HCPCS | Performed by: SURGERY

## 2022-09-12 PROCEDURE — C9290 INJ, BUPIVACAINE LIPOSOME: HCPCS | Performed by: SURGERY

## 2022-09-12 PROCEDURE — 88307 TISSUE EXAM BY PATHOLOGIST: CPT | Performed by: PATHOLOGY

## 2022-09-12 PROCEDURE — 0DJ08ZZ INSPECTION OF UPPER INTESTINAL TRACT, VIA NATURAL OR ARTIFICIAL OPENING ENDOSCOPIC: ICD-10-PCS | Performed by: SURGERY

## 2022-09-12 PROCEDURE — 0DB64Z3 EXCISION OF STOMACH, PERCUTANEOUS ENDOSCOPIC APPROACH, VERTICAL: ICD-10-PCS | Performed by: SURGERY

## 2022-09-12 PROCEDURE — 88342 IMHCHEM/IMCYTCHM 1ST ANTB: CPT | Performed by: PATHOLOGY

## 2022-09-12 DEVICE — SEAMGUARD STPL REINF ENDO GIA ULTRA UNV 60 BLACK: Type: IMPLANTABLE DEVICE | Site: STOMACH | Status: FUNCTIONAL

## 2022-09-12 DEVICE — SEAMGUARD STPL REINF ENDO GIA ULTRA UNIV 60 PURPLE: Type: IMPLANTABLE DEVICE | Site: STOMACH | Status: FUNCTIONAL

## 2022-09-12 RX ORDER — ACETAMINOPHEN 325 MG/1
975 TABLET ORAL ONCE
Status: COMPLETED | OUTPATIENT
Start: 2022-09-12 | End: 2022-09-12

## 2022-09-12 RX ORDER — LIDOCAINE HYDROCHLORIDE 20 MG/ML
INJECTION, SOLUTION EPIDURAL; INFILTRATION; INTRACAUDAL; PERINEURAL AS NEEDED
Status: DISCONTINUED | OUTPATIENT
Start: 2022-09-12 | End: 2022-09-12

## 2022-09-12 RX ORDER — DEXAMETHASONE SODIUM PHOSPHATE 10 MG/ML
INJECTION, SOLUTION INTRAMUSCULAR; INTRAVENOUS AS NEEDED
Status: DISCONTINUED | OUTPATIENT
Start: 2022-09-12 | End: 2022-09-12

## 2022-09-12 RX ORDER — DEXTROAMPHETAMINE SACCHARATE, AMPHETAMINE ASPARTATE MONOHYDRATE, DEXTROAMPHETAMINE SULFATE AND AMPHETAMINE SULFATE 7.5; 7.5; 7.5; 7.5 MG/1; MG/1; MG/1; MG/1
30 CAPSULE, EXTENDED RELEASE ORAL EVERY MORNING
Status: DISCONTINUED | OUTPATIENT
Start: 2022-09-13 | End: 2022-09-13 | Stop reason: HOSPADM

## 2022-09-12 RX ORDER — FENTANYL CITRATE/PF 50 MCG/ML
25 SYRINGE (ML) INJECTION
Status: DISCONTINUED | OUTPATIENT
Start: 2022-09-12 | End: 2022-09-12 | Stop reason: HOSPADM

## 2022-09-12 RX ORDER — BUPIVACAINE HYDROCHLORIDE 5 MG/ML
INJECTION, SOLUTION EPIDURAL; INTRACAUDAL AS NEEDED
Status: DISCONTINUED | OUTPATIENT
Start: 2022-09-12 | End: 2022-09-12 | Stop reason: HOSPADM

## 2022-09-12 RX ORDER — METOCLOPRAMIDE HYDROCHLORIDE 5 MG/ML
10 INJECTION INTRAMUSCULAR; INTRAVENOUS EVERY 6 HOURS PRN
Status: DISCONTINUED | OUTPATIENT
Start: 2022-09-12 | End: 2022-09-13 | Stop reason: HOSPADM

## 2022-09-12 RX ORDER — CEFAZOLIN SODIUM 2 G/50ML
2000 SOLUTION INTRAVENOUS ONCE
Status: DISCONTINUED | OUTPATIENT
Start: 2022-09-12 | End: 2022-09-12 | Stop reason: HOSPADM

## 2022-09-12 RX ORDER — SODIUM CHLORIDE, SODIUM LACTATE, POTASSIUM CHLORIDE, CALCIUM CHLORIDE 600; 310; 30; 20 MG/100ML; MG/100ML; MG/100ML; MG/100ML
INJECTION, SOLUTION INTRAVENOUS CONTINUOUS PRN
Status: DISCONTINUED | OUTPATIENT
Start: 2022-09-12 | End: 2022-09-12

## 2022-09-12 RX ORDER — OXYCODONE HCL 5 MG/5 ML
10 SOLUTION, ORAL ORAL EVERY 4 HOURS PRN
Status: DISCONTINUED | OUTPATIENT
Start: 2022-09-12 | End: 2022-09-13 | Stop reason: HOSPADM

## 2022-09-12 RX ORDER — FENTANYL CITRATE 50 UG/ML
INJECTION, SOLUTION INTRAMUSCULAR; INTRAVENOUS AS NEEDED
Status: DISCONTINUED | OUTPATIENT
Start: 2022-09-12 | End: 2022-09-12

## 2022-09-12 RX ORDER — PROPOFOL 10 MG/ML
INJECTION, EMULSION INTRAVENOUS AS NEEDED
Status: DISCONTINUED | OUTPATIENT
Start: 2022-09-12 | End: 2022-09-12

## 2022-09-12 RX ORDER — MAGNESIUM HYDROXIDE 1200 MG/15ML
LIQUID ORAL AS NEEDED
Status: DISCONTINUED | OUTPATIENT
Start: 2022-09-12 | End: 2022-09-12 | Stop reason: HOSPADM

## 2022-09-12 RX ORDER — ACETAMINOPHEN 325 MG/1
975 TABLET ORAL EVERY 6 HOURS SCHEDULED
Status: DISCONTINUED | OUTPATIENT
Start: 2022-09-12 | End: 2022-09-13 | Stop reason: HOSPADM

## 2022-09-12 RX ORDER — LORAZEPAM 2 MG/ML
0.5 INJECTION INTRAMUSCULAR EVERY 6 HOURS PRN
Status: DISCONTINUED | OUTPATIENT
Start: 2022-09-12 | End: 2022-09-13 | Stop reason: HOSPADM

## 2022-09-12 RX ORDER — KETOROLAC TROMETHAMINE 30 MG/ML
15 INJECTION, SOLUTION INTRAMUSCULAR; INTRAVENOUS EVERY 6 HOURS SCHEDULED
Status: DISCONTINUED | OUTPATIENT
Start: 2022-09-12 | End: 2022-09-13 | Stop reason: HOSPADM

## 2022-09-12 RX ORDER — HYDROMORPHONE HCL/PF 1 MG/ML
0.2 SYRINGE (ML) INJECTION
Status: DISCONTINUED | OUTPATIENT
Start: 2022-09-12 | End: 2022-09-12 | Stop reason: HOSPADM

## 2022-09-12 RX ORDER — ENOXAPARIN SODIUM 100 MG/ML
40 INJECTION SUBCUTANEOUS
Status: COMPLETED | OUTPATIENT
Start: 2022-09-12 | End: 2022-09-12

## 2022-09-12 RX ORDER — ONDANSETRON 2 MG/ML
4 INJECTION INTRAMUSCULAR; INTRAVENOUS EVERY 6 HOURS PRN
Status: DISCONTINUED | OUTPATIENT
Start: 2022-09-12 | End: 2022-09-13 | Stop reason: HOSPADM

## 2022-09-12 RX ORDER — PROMETHAZINE HYDROCHLORIDE 25 MG/ML
25 INJECTION, SOLUTION INTRAMUSCULAR; INTRAVENOUS EVERY 6 HOURS PRN
Status: DISCONTINUED | OUTPATIENT
Start: 2022-09-12 | End: 2022-09-13 | Stop reason: HOSPADM

## 2022-09-12 RX ORDER — GABAPENTIN 300 MG/1
600 CAPSULE ORAL ONCE
Status: COMPLETED | OUTPATIENT
Start: 2022-09-12 | End: 2022-09-12

## 2022-09-12 RX ORDER — OXYCODONE HCL 5 MG/5 ML
5 SOLUTION, ORAL ORAL EVERY 4 HOURS PRN
Status: DISCONTINUED | OUTPATIENT
Start: 2022-09-12 | End: 2022-09-13 | Stop reason: HOSPADM

## 2022-09-12 RX ORDER — SODIUM CHLORIDE, SODIUM LACTATE, POTASSIUM CHLORIDE, CALCIUM CHLORIDE 600; 310; 30; 20 MG/100ML; MG/100ML; MG/100ML; MG/100ML
20 INJECTION, SOLUTION INTRAVENOUS CONTINUOUS
Status: CANCELLED | OUTPATIENT
Start: 2022-09-12

## 2022-09-12 RX ORDER — HYDRALAZINE HYDROCHLORIDE 20 MG/ML
10 INJECTION INTRAMUSCULAR; INTRAVENOUS ONCE
Status: DISCONTINUED | OUTPATIENT
Start: 2022-09-12 | End: 2022-09-12

## 2022-09-12 RX ORDER — METOCLOPRAMIDE HYDROCHLORIDE 5 MG/ML
10 INJECTION INTRAMUSCULAR; INTRAVENOUS ONCE AS NEEDED
Status: COMPLETED | OUTPATIENT
Start: 2022-09-12 | End: 2022-09-12

## 2022-09-12 RX ORDER — HYDRALAZINE HYDROCHLORIDE 20 MG/ML
10 INJECTION INTRAMUSCULAR; INTRAVENOUS ONCE
Status: COMPLETED | OUTPATIENT
Start: 2022-09-12 | End: 2022-09-12

## 2022-09-12 RX ORDER — ROCURONIUM BROMIDE 10 MG/ML
INJECTION, SOLUTION INTRAVENOUS AS NEEDED
Status: DISCONTINUED | OUTPATIENT
Start: 2022-09-12 | End: 2022-09-12

## 2022-09-12 RX ORDER — KETAMINE HYDROCHLORIDE 50 MG/ML
INJECTION, SOLUTION, CONCENTRATE INTRAMUSCULAR; INTRAVENOUS AS NEEDED
Status: DISCONTINUED | OUTPATIENT
Start: 2022-09-12 | End: 2022-09-12

## 2022-09-12 RX ORDER — PROPOFOL 10 MG/ML
INJECTION, EMULSION INTRAVENOUS CONTINUOUS PRN
Status: DISCONTINUED | OUTPATIENT
Start: 2022-09-12 | End: 2022-09-12

## 2022-09-12 RX ORDER — SIMETHICONE 80 MG
80 TABLET,CHEWABLE ORAL EVERY 12 HOURS SCHEDULED
Status: DISCONTINUED | OUTPATIENT
Start: 2022-09-12 | End: 2022-09-13 | Stop reason: HOSPADM

## 2022-09-12 RX ORDER — MIDAZOLAM HYDROCHLORIDE 2 MG/2ML
INJECTION, SOLUTION INTRAMUSCULAR; INTRAVENOUS AS NEEDED
Status: DISCONTINUED | OUTPATIENT
Start: 2022-09-12 | End: 2022-09-12

## 2022-09-12 RX ORDER — CEFAZOLIN SODIUM 1 G/3ML
INJECTION, POWDER, FOR SOLUTION INTRAMUSCULAR; INTRAVENOUS AS NEEDED
Status: DISCONTINUED | OUTPATIENT
Start: 2022-09-12 | End: 2022-09-12

## 2022-09-12 RX ORDER — ONDANSETRON 2 MG/ML
INJECTION INTRAMUSCULAR; INTRAVENOUS AS NEEDED
Status: DISCONTINUED | OUTPATIENT
Start: 2022-09-12 | End: 2022-09-12

## 2022-09-12 RX ORDER — SODIUM CHLORIDE, SODIUM LACTATE, POTASSIUM CHLORIDE, CALCIUM CHLORIDE 600; 310; 30; 20 MG/100ML; MG/100ML; MG/100ML; MG/100ML
100 INJECTION, SOLUTION INTRAVENOUS CONTINUOUS
Status: DISCONTINUED | OUTPATIENT
Start: 2022-09-12 | End: 2022-09-13 | Stop reason: HOSPADM

## 2022-09-12 RX ORDER — FAMOTIDINE 10 MG/ML
20 INJECTION, SOLUTION INTRAVENOUS EVERY 12 HOURS SCHEDULED
Status: DISCONTINUED | OUTPATIENT
Start: 2022-09-12 | End: 2022-09-13 | Stop reason: HOSPADM

## 2022-09-12 RX ORDER — ONDANSETRON 2 MG/ML
4 INJECTION INTRAMUSCULAR; INTRAVENOUS ONCE AS NEEDED
Status: COMPLETED | OUTPATIENT
Start: 2022-09-12 | End: 2022-09-12

## 2022-09-12 RX ORDER — ENOXAPARIN SODIUM 100 MG/ML
40 INJECTION SUBCUTANEOUS DAILY
Status: DISCONTINUED | OUTPATIENT
Start: 2022-09-13 | End: 2022-09-13 | Stop reason: HOSPADM

## 2022-09-12 RX ORDER — SODIUM CHLORIDE 9 MG/ML
INJECTION, SOLUTION INTRAVENOUS CONTINUOUS PRN
Status: DISCONTINUED | OUTPATIENT
Start: 2022-09-12 | End: 2022-09-12

## 2022-09-12 RX ORDER — MORPHINE SULFATE 4 MG/ML
4 INJECTION, SOLUTION INTRAMUSCULAR; INTRAVENOUS EVERY 2 HOUR PRN
Status: DISCONTINUED | OUTPATIENT
Start: 2022-09-12 | End: 2022-09-13 | Stop reason: HOSPADM

## 2022-09-12 RX ORDER — SCOLOPAMINE TRANSDERMAL SYSTEM 1 MG/1
1 PATCH, EXTENDED RELEASE TRANSDERMAL ONCE
Status: DISCONTINUED | OUTPATIENT
Start: 2022-09-12 | End: 2022-09-13 | Stop reason: HOSPADM

## 2022-09-12 RX ORDER — CELECOXIB 200 MG/1
200 CAPSULE ORAL ONCE
Status: COMPLETED | OUTPATIENT
Start: 2022-09-12 | End: 2022-09-12

## 2022-09-12 RX ORDER — LEVOTHYROXINE SODIUM 0.07 MG/1
75 TABLET ORAL DAILY
Status: DISCONTINUED | OUTPATIENT
Start: 2022-09-13 | End: 2022-09-13 | Stop reason: HOSPADM

## 2022-09-12 RX ADMIN — ONDANSETRON 4 MG: 2 INJECTION INTRAMUSCULAR; INTRAVENOUS at 11:32

## 2022-09-12 RX ADMIN — ROCURONIUM BROMIDE 50 MG: 10 INJECTION, SOLUTION INTRAVENOUS at 10:06

## 2022-09-12 RX ADMIN — ACETAMINOPHEN 975 MG: 325 TABLET ORAL at 08:42

## 2022-09-12 RX ADMIN — SUGAMMADEX 200 MG: 100 INJECTION, SOLUTION INTRAVENOUS at 10:59

## 2022-09-12 RX ADMIN — ONDANSETRON 4 MG: 2 INJECTION INTRAMUSCULAR; INTRAVENOUS at 16:11

## 2022-09-12 RX ADMIN — ACETAMINOPHEN 975 MG: 325 TABLET ORAL at 23:48

## 2022-09-12 RX ADMIN — LIDOCAINE HYDROCHLORIDE 100 MG: 20 INJECTION, SOLUTION EPIDURAL; INFILTRATION; INTRACAUDAL; PERINEURAL at 10:06

## 2022-09-12 RX ADMIN — KETAMINE HYDROCHLORIDE 20 MG: 50 INJECTION INTRAMUSCULAR; INTRAVENOUS at 10:29

## 2022-09-12 RX ADMIN — OXYCODONE HYDROCHLORIDE 5 MG: 5 SOLUTION ORAL at 16:13

## 2022-09-12 RX ADMIN — KETOROLAC TROMETHAMINE 15 MG: 30 INJECTION, SOLUTION INTRAMUSCULAR at 16:11

## 2022-09-12 RX ADMIN — DEXAMETHASONE SODIUM PHOSPHATE 10 MG: 10 INJECTION, SOLUTION INTRAMUSCULAR; INTRAVENOUS at 10:29

## 2022-09-12 RX ADMIN — METOCLOPRAMIDE HYDROCHLORIDE 10 MG: 5 INJECTION INTRAMUSCULAR; INTRAVENOUS at 12:07

## 2022-09-12 RX ADMIN — FENTANYL CITRATE 25 MCG: 50 INJECTION, SOLUTION INTRAMUSCULAR; INTRAVENOUS at 11:45

## 2022-09-12 RX ADMIN — HYDROMORPHONE HYDROCHLORIDE 0.2 MG: 1 INJECTION, SOLUTION INTRAMUSCULAR; INTRAVENOUS; SUBCUTANEOUS at 12:05

## 2022-09-12 RX ADMIN — PROPOFOL 200 MG: 10 INJECTION, EMULSION INTRAVENOUS at 10:06

## 2022-09-12 RX ADMIN — PROPOFOL 100 MCG/KG/MIN: 10 INJECTION, EMULSION INTRAVENOUS at 10:06

## 2022-09-12 RX ADMIN — ONDANSETRON 4 MG: 2 INJECTION INTRAMUSCULAR; INTRAVENOUS at 10:56

## 2022-09-12 RX ADMIN — SODIUM CHLORIDE: 0.9 INJECTION, SOLUTION INTRAVENOUS at 10:00

## 2022-09-12 RX ADMIN — ONDANSETRON 4 MG: 2 INJECTION INTRAMUSCULAR; INTRAVENOUS at 23:51

## 2022-09-12 RX ADMIN — KETAMINE HYDROCHLORIDE 30 MG: 50 INJECTION INTRAMUSCULAR; INTRAVENOUS at 10:06

## 2022-09-12 RX ADMIN — SIMETHICONE 80 MG: 80 TABLET, CHEWABLE ORAL at 22:39

## 2022-09-12 RX ADMIN — GABAPENTIN 600 MG: 300 CAPSULE ORAL at 08:47

## 2022-09-12 RX ADMIN — SCOPALAMINE 1 PATCH: 1 PATCH, EXTENDED RELEASE TRANSDERMAL at 08:48

## 2022-09-12 RX ADMIN — CEFAZOLIN 1000 MG: 1 INJECTION, POWDER, FOR SOLUTION INTRAMUSCULAR; INTRAVENOUS at 10:06

## 2022-09-12 RX ADMIN — ROCURONIUM BROMIDE 10 MG: 10 INJECTION, SOLUTION INTRAVENOUS at 10:29

## 2022-09-12 RX ADMIN — FENTANYL CITRATE 25 MCG: 50 INJECTION, SOLUTION INTRAMUSCULAR; INTRAVENOUS at 11:40

## 2022-09-12 RX ADMIN — MIDAZOLAM HYDROCHLORIDE 2 MG: 1 INJECTION, SOLUTION INTRAMUSCULAR; INTRAVENOUS at 10:00

## 2022-09-12 RX ADMIN — FAMOTIDINE 20 MG: 10 INJECTION, SOLUTION INTRAVENOUS at 16:12

## 2022-09-12 RX ADMIN — ACETAMINOPHEN 975 MG: 325 TABLET ORAL at 18:47

## 2022-09-12 RX ADMIN — FENTANYL CITRATE 25 MCG: 50 INJECTION, SOLUTION INTRAMUSCULAR; INTRAVENOUS at 11:27

## 2022-09-12 RX ADMIN — FENTANYL CITRATE 100 MCG: 50 INJECTION, SOLUTION INTRAMUSCULAR; INTRAVENOUS at 10:06

## 2022-09-12 RX ADMIN — HYDROMORPHONE HYDROCHLORIDE 0.2 MG: 1 INJECTION, SOLUTION INTRAMUSCULAR; INTRAVENOUS; SUBCUTANEOUS at 13:10

## 2022-09-12 RX ADMIN — HYDRALAZINE HYDROCHLORIDE 10 MG: 20 INJECTION INTRAMUSCULAR; INTRAVENOUS at 13:00

## 2022-09-12 RX ADMIN — SODIUM CHLORIDE, SODIUM LACTATE, POTASSIUM CHLORIDE, AND CALCIUM CHLORIDE 100 ML/HR: .6; .31; .03; .02 INJECTION, SOLUTION INTRAVENOUS at 16:11

## 2022-09-12 RX ADMIN — SODIUM CHLORIDE 0.2 MCG/KG/HR: 9 INJECTION, SOLUTION INTRAVENOUS at 10:15

## 2022-09-12 RX ADMIN — SODIUM CHLORIDE, SODIUM LACTATE, POTASSIUM CHLORIDE, AND CALCIUM CHLORIDE: .6; .31; .03; .02 INJECTION, SOLUTION INTRAVENOUS at 10:00

## 2022-09-12 RX ADMIN — FENTANYL CITRATE 50 MCG: 50 INJECTION, SOLUTION INTRAMUSCULAR; INTRAVENOUS at 10:29

## 2022-09-12 RX ADMIN — ROCURONIUM BROMIDE 10 MG: 10 INJECTION, SOLUTION INTRAVENOUS at 10:45

## 2022-09-12 RX ADMIN — KETOROLAC TROMETHAMINE 15 MG: 30 INJECTION, SOLUTION INTRAMUSCULAR at 23:48

## 2022-09-12 RX ADMIN — HYDROMORPHONE HYDROCHLORIDE 0.2 MG: 1 INJECTION, SOLUTION INTRAMUSCULAR; INTRAVENOUS; SUBCUTANEOUS at 13:04

## 2022-09-12 RX ADMIN — CELECOXIB 200 MG: 200 CAPSULE ORAL at 08:43

## 2022-09-12 RX ADMIN — ENOXAPARIN SODIUM 40 MG: 40 INJECTION SUBCUTANEOUS at 08:48

## 2022-09-12 NOTE — ANESTHESIA PREPROCEDURE EVALUATION
Procedure:  GASTRECTOMY SLEEVE LAPAROSCOPIC AND INTAOPERATIVE EGD (N/A Abdomen)    Relevant Problems   ENDO   (+) Acquired hypothyroidism      PULMONARY   (+) BRUCE (obstructive sleep apnea)        Physical Exam    Airway    Mallampati score: III  TM Distance: >3 FB  Neck ROM: full     Dental       Cardiovascular  Cardiovascular exam normal    Pulmonary  Pulmonary exam normal     Other Findings        Anesthesia Plan  ASA Score- 2     Anesthesia Type- general with ASA Monitors  Additional Monitors:   Airway Plan: ETT  Plan Factors-Exercise tolerance (METS): <4 METS  Chart reviewed  Existing labs reviewed  Patient summary reviewed  Patient is not a current smoker  Obstructive sleep apnea risk education given perioperatively  Induction- intravenous and awake intubation  Postoperative Plan- Plan for postoperative opioid use  Planned trial extubation    Informed Consent- Anesthetic plan and risks discussed with patient  I personally reviewed this patient with the CRNA  Discussed and agreed on the Anesthesia Plan with the CRNA  Katelynn Ramsay            ADHD (attention deficit hyperactivity disorder)   Morbid obesity with BMI of 45 0-49 9, adult ([facility])   Acquired hypothyroidism   BRUCE (obstructive sleep apnea)       History Comments History Comments   ADHD (attention deficit hyperactivity disorder)  Morbid obesity with BMI of 45 0-49 9, adult ([facility] [address] )    Hypothyroid          Obstetric History       0    Para   0    Term   0       0    AB   0    Living   0      SAB   0    IAB   0    Ectopic   0    Multiple   0    Live Births   0            Surgical History     Current as of 22  WRIST SURGERY WISDOM TOOTH EXTRACTION   EGD      Substance History     Current as of 22  Smoking Status: Never Smoker   Smokeless Tobacco Status: Never Used   Alcohol use: Yes, unspecified volume   Drug use: Never

## 2022-09-12 NOTE — ANESTHESIA POSTPROCEDURE EVALUATION
Post-Op Assessment Note    CV Status:  Stable  Pain Score: 2    Pain management: adequate     Mental Status:  Alert and awake   Hydration Status:  Euvolemic   PONV Controlled:  Controlled   Airway Patency:  Patent      Post Op Vitals Reviewed: Yes      Staff: CRNA         No complications documented      BP   129/73   Temp   97 9   Pulse  76   Resp   18   SpO2   99

## 2022-09-12 NOTE — PLAN OF CARE
Problem: PAIN - ADULT  Goal: Verbalizes/displays adequate comfort level or baseline comfort level  Description: Interventions:  - Encourage patient to monitor pain and request assistance  - Assess pain using appropriate pain scale  - Administer analgesics based on type and severity of pain and evaluate response  - Implement non-pharmacological measures as appropriate and evaluate response  - Consider cultural and social influences on pain and pain management  - Notify physician/advanced practitioner if interventions unsuccessful or patient reports new pain  Outcome: Progressing     Problem: INFECTION - ADULT  Goal: Absence or prevention of progression during hospitalization  Description: INTERVENTIONS:  - Assess and monitor for signs and symptoms of infection  - Monitor lab/diagnostic results  - Monitor all insertion sites, i e  indwelling lines, tubes, and drains  - Monitor endotracheal if appropriate and nasal secretions for changes in amount and color  - [place] appropriate cooling/warming therapies per order  - Administer medications as ordered  - Instruct and encourage patient and family to use good hand hygiene technique  - Identify and instruct in appropriate isolation precautions for identified infection/condition  Outcome: Progressing  Goal: Absence of fever/infection during neutropenic period  Description: INTERVENTIONS:  - Monitor WBC    Outcome: Progressing     Problem: SAFETY ADULT  Goal: Patient will remain free of falls  Description: INTERVENTIONS:  - Educate patient/family on patient safety including physical limitations  - Instruct patient to call for assistance with activity   - Consult OT/PT to assist with strengthening/mobility   - Keep Call bell within reach  - Keep bed low and locked with side rails adjusted as appropriate  - Keep care items and personal belongings within reach  - Initiate and maintain comfort rounds  - Make Fall Risk Sign visible to staff  - Offer Toileting every 2 Hours, in advance of need  - Initiate/Maintain alarm  - Obtain necessary fall risk management equipment:   - Apply yellow socks and bracelet for high fall risk patients  - Consider moving patient to room near nurses station  Outcome: Progressing  Goal: Maintain or return to baseline ADL function  Description: INTERVENTIONS:  -  Assess patient's ability to carry out ADLs; assess patient's baseline for ADL function and identify physical deficits which impact ability to perform ADLs (bathing, care of mouth/teeth, toileting, grooming, dressing, etc )  - Assess/evaluate cause of self-care deficits   - Assess range of motion  - Assess patient's mobility; develop plan if impaired  - Assess patient's need for assistive devices and provide as appropriate  - Encourage maximum independence but intervene and supervise when necessary  - Involve family in performance of ADLs  - Assess for home care needs following discharge   - Consider OT consult to assist with ADL evaluation and planning for discharge  - Provide patient education as appropriate  Outcome: Progressing  Goal: Maintains/Returns to pre admission functional level  Description: INTERVENTIONS:  - Perform BMAT or MOVE assessment daily    - Set and communicate daily mobility goal to care team and patient/family/caregiver  - Collaborate with rehabilitation services on mobility goals if consulted  - Perform Range of Motion 3 times a day  - Reposition patient every 2 hours    - Dangle patient 3 times a day  - Stand patient 3 times a day  - Ambulate patient 3 times a day  - Out of bed to chair 3 times a day   - Out of bed for meals 3 times a day  - Out of bed for toileting  - Record patient progress and toleration of activity level   Outcome: Progressing     Problem: DISCHARGE PLANNING  Goal: Discharge to home or other facility with appropriate resources  Description: INTERVENTIONS:  - Identify barriers to discharge w/patient and caregiver  - Arrange for needed discharge resources and transportation as appropriate  - Identify discharge learning needs (meds, wound care, etc )  - Arrange for interpretive services to assist at discharge as needed  - Refer to Case Management Department for coordinating discharge planning if the patient needs post-hospital services based on physician/advanced practitioner order or complex needs related to functional status, cognitive ability, or social support system  Outcome: Progressing     Problem: Knowledge Deficit  Goal: Patient/family/caregiver demonstrates understanding of disease process, treatment plan, medications, and discharge instructions  Description: Complete learning assessment and assess knowledge base    Interventions:  - Provide teaching at level of understanding  - Provide teaching via preferred learning methods  Outcome: Progressing

## 2022-09-12 NOTE — OP NOTE
OPERATIVE REPORT  PATIENT NAME: Zev Valle    :  2000  MRN: [id number]  Pt Location: [facility] OR ROOM 04    SURGERY DATE: 2022    Surgeon(s) and Role:     * Demarcus Vieyra MD - Primary     * Silvano Hwang MD - Assisting     * Christopher Wilson PA-C - Assisting    Preop Diagnosis:  Morbid obesity ([address] ) [E66 01]  Obstructive sleep apnea (adult) (pediatric) [G47 33]    Post-Op Diagnosis Codes:     * Morbid obesity ([address] ) [E66 01]     * Obstructive sleep apnea (adult) (pediatric) [G47 33]    Procedure(s) (LRB):  GASTRECTOMY SLEEVE LAPAROSCOPIC AND INTAOPERATIVE EGD (N/A)    Specimen(s):  ID Type Source Tests Collected by Time Destination   1 : Portion of stomach for H Pylori Tissue Stomach TISSUE EXAM Demarcus Vieyra MD 2022 1100        Estimated Blood Loss:   20cc    Drains:  * No LDAs found *    Anesthesia Type:   General    Operative Indications: Morbid obesity ([facility] [address] ) [E66 01]  Obstructive sleep apnea (adult) (pediatric) [G47 33]      Operative Findings:  Normal anatomy    Complications:   None    Procedure and Technique:  INDICATION:    Zev Valle is a 20 y o  female with a Body mass index is 42 82 kg/m²  and a long standing history of morbid obesity and inability to lose a significant amount of weight on its own  This patient was found to be a good candidate to undergo a bariatric procedure upon being enrolled here at the [place] [address] [facility] in [facility]  OPERATIVE TECHNIQUE    The patient was taken to the operating room and placed in a supine position  A dose of IV antibiotic prophylaxis that consisted of Ancef 3g was given  Also, 40 mg of lovenox to prevent DVT were administered preoperatively  Sequential compression devices were placed on both lower extremities  After satisfactory general anesthesia induction and endotracheal intubation was achieved, the extremities were secured to prevent neurovascular and musculoskeletal injuries as best as possible   Subsequently, the abdominal wall was prepped and draped in the standard sterile fashion  After a timeout was done and the patient was properly identified and the type of procedure was confirmed a LUQ transverse skin incision was made, and the subcutaneous tissues dissected  A veress needle technique was used for access to the peritoneal cavity and pneumoperitoneum was created to 15mmHg  Access to the peritoneal cavity was gained with an 12mm optical trocar  With this device, we were able to visualize the layers of the abdominal wall, and enter the peritoneal cavity under direct visualization  Under direct laparoscopic visualization, a four quadrant transversus abdominis plane block was performed  Four additional trocars were placed: a 5 mm in the right upper quadrant subcostal position in the anterior axillary line, a 15-mm port was placed in the right flank midclavicular line, a 5-mm port was placed in the left flank position in the midclavicular line and another 12-mm port was placed in the suprumbilical position to the patient's left of the midline for the camera  The left lobe of the liver was of small enough size that a liver retractor was not needed  The patient was repositioned to a reverse Trendelenburg position  We proceeded to divide the gastrocolic ligament with the energy device to enter the lesser sac  We continued to divide this ligament along the greater curvature of the stomach towards the angle of His  Special care was taken while dividing the short gastric vessels close to the spleen  This process was completely hemostatic  We then turned to the creation of an elongated and thin gastric pouch  A 36 [place] calibration tube was placed by the anesthesia staff into the stomach under our laparoscopic surveillance  Once the tip of the bougie was confirmed to be next to the pylorus, serial firings of the laparoscopic stapler with 60-mm cartridges were utilized   We started in a point inferior to the incisura angularis and the Crows foot nerve looking to preserve the gastric emptying  This was 5 to 6 centimeters proximal to the pylorus  The staple lines were reinforced with buttressing material  We created a pouch based on the lesser curve, and in a semi vertical orientation  We continued the vertical serial firings of the stapler to the angle of His gently cinching the bougie with our laparoscopic stapler looking to create a thin pouch  As we approached the fundus of the stomach and the angle of His, the stapler loads were changed appropriately according to the variable thickness of the tissue  This completely  the pouch from the specimen of remnant stomach  We then turned our attention to the newly created pouch and examined it for bleeding or obvious defects on the staple lines and none were found  The distal stomach pouch was occluded with a Alton clamp, and an EGD as well as an air insufflation test was performed  Neither intraoperative bleeding nor leaks were detected  The gastric remnant was externalized through the right sided 15-mm trocar site and passed off the surgical field to be sent to pathology  The sponge, needle and instrument count was reported complete  The 15-mm trocar and periumbilical trocar sites were then closed with use of a suture closure device with absorbable suture  The liver retractor and the remainder ports were then removed under direct laparoscopic visualization and no back bleeding was noted  The skin incisions were all closed with 4-0 absorbable subcuticular suture  The patient tolerated the procedure well, was extubated uneventfully and was transferred to the recovery room in stable condition  I was present for the entire length of the procedure as the attending of record    No qualified resident was available to assist   The presence of an assistant was necessary for camera holding, traction and counter traction and for help with suturing and stapling in addition to performing the intraop-EGD       I was present for the entire procedure and I was present for all critical portions of the procedure    Patient Disposition:  PACU  and extubated and stable      SIGNATURE: Wallace Michel MD  DATE: September 12, 2022  TIME: 11:04 AM

## 2022-09-13 VITALS
OXYGEN SATURATION: 96 % | TEMPERATURE: 98.2 F | BODY MASS INDEX: 42.91 KG/M2 | WEIGHT: 273.37 LBS | HEIGHT: 67 IN | DIASTOLIC BLOOD PRESSURE: 105 MMHG | SYSTOLIC BLOOD PRESSURE: 166 MMHG | HEART RATE: 79 BPM | RESPIRATION RATE: 17 BRPM

## 2022-09-13 LAB — GLUCOSE SERPL-MCNC: 100 MG/DL (ref 65–140)

## 2022-09-13 PROCEDURE — NC001 PR NO CHARGE: Performed by: SURGERY

## 2022-09-13 PROCEDURE — 82948 REAGENT STRIP/BLOOD GLUCOSE: CPT

## 2022-09-13 PROCEDURE — 99024 POSTOP FOLLOW-UP VISIT: CPT | Performed by: SURGERY

## 2022-09-13 RX ORDER — ONDANSETRON 4 MG/1
4 TABLET, ORALLY DISINTEGRATING ORAL EVERY 6 HOURS PRN
Qty: 20 TABLET | Refills: 0 | Status: SHIPPED | OUTPATIENT
Start: 2022-09-13

## 2022-09-13 RX ORDER — ACETAMINOPHEN 325 MG/1
975 TABLET ORAL EVERY 8 HOURS SCHEDULED
Qty: 63 TABLET | Refills: 0
Start: 2022-09-13 | End: 2022-09-20

## 2022-09-13 RX ORDER — DEXAMETHASONE SODIUM PHOSPHATE 4 MG/ML
10 INJECTION, SOLUTION INTRA-ARTICULAR; INTRALESIONAL; INTRAMUSCULAR; INTRAVENOUS; SOFT TISSUE ONCE
Status: COMPLETED | OUTPATIENT
Start: 2022-09-13 | End: 2022-09-13

## 2022-09-13 RX ADMIN — ENOXAPARIN SODIUM 40 MG: 40 INJECTION SUBCUTANEOUS at 09:16

## 2022-09-13 RX ADMIN — LEVOTHYROXINE SODIUM 75 MCG: 75 TABLET ORAL at 05:57

## 2022-09-13 RX ADMIN — ACETAMINOPHEN 975 MG: 325 TABLET ORAL at 05:53

## 2022-09-13 RX ADMIN — FAMOTIDINE 20 MG: 10 INJECTION, SOLUTION INTRAVENOUS at 09:17

## 2022-09-13 RX ADMIN — KETOROLAC TROMETHAMINE 15 MG: 30 INJECTION, SOLUTION INTRAMUSCULAR at 05:54

## 2022-09-13 RX ADMIN — SODIUM CHLORIDE, SODIUM LACTATE, POTASSIUM CHLORIDE, AND CALCIUM CHLORIDE 500 ML: .6; .31; .03; .02 INJECTION, SOLUTION INTRAVENOUS at 07:59

## 2022-09-13 RX ADMIN — DEXAMETHASONE SODIUM PHOSPHATE 10 MG: 4 INJECTION INTRA-ARTICULAR; INTRALESIONAL; INTRAMUSCULAR; INTRAVENOUS; SOFT TISSUE at 07:53

## 2022-09-13 RX ADMIN — SIMETHICONE 80 MG: 80 TABLET, CHEWABLE ORAL at 09:16

## 2022-09-13 RX ADMIN — OXYCODONE HYDROCHLORIDE 5 MG: 5 SOLUTION ORAL at 07:58

## 2022-09-13 RX ADMIN — SODIUM CHLORIDE, SODIUM LACTATE, POTASSIUM CHLORIDE, AND CALCIUM CHLORIDE 100 ML/HR: .6; .31; .03; .02 INJECTION, SOLUTION INTRAVENOUS at 05:53

## 2022-09-13 RX ADMIN — TRIMETHOBENZAMIDE HYDROCHLORIDE 200 MG: 100 INJECTION INTRAMUSCULAR at 07:53

## 2022-09-13 NOTE — UTILIZATION REVIEW
Notification of Discharge   This is a Notification of Discharge from our facility [address]  Please be advised that this patient has been discharge from our facility  Below you will find the admission and discharge date and time including the patients disposition  UTILIZATION REVIEW CONTACT:  Laurie Constantino  Utilization   Network Utilization Review Department  Phone: [phone] x carefully listen to the prompts  All voicemails are confidential   Email: [email or web address]     PHYSICIAN ADVISORY SERVICES:  FOR [id number] REVIEW - MEDICAL NECESSITY DENIAL  Phone: [phone]  Fax: [phone]  Email: [email or web address]     PRESENTATION DATE: 9/12/2022  8:00 AM  OBERVATION ADMISSION DATE:   INPATIENT ADMISSION DATE: 9/12/22 11:16 AM   DISCHARGE DATE: 9/13/2022 12:09 PM  DISPOSITION: Home/Self Care Home/Self Care      IMPORTANT INFORMATION:  Send all requests for admission clinical reviews, approved or denied determinations and any other requests to dedicated fax number below belonging to the campus where the patient is receiving treatment   List of dedicated fax numbers:  [address] DENIALS (Administrative/Medical Necessity) [phone]   [address] [place] [address] (Maternity/NICU/Pediatrics) [phone]   [facility] [phone]   130 [facility] [phone]   [address] [place] [address] [phone]   2000 [facility] [address] [place] [address],[address] [phone]   [facility]  [phone]   2205 [facility] [place] [facility]  2401 [facility] 1000 [address] [facility] [phone]

## 2022-09-13 NOTE — UTILIZATION REVIEW
Initial Clinical Review    Elective  surgical procedure   [id number]  Age/Sex: 20 y o  female  Surgery Date: 9/12  Procedure: GASTRECTOMY SLEEVE LAPAROSCOPIC AND INTAOPERATIVE EGD   Anesthesia: general   Operative Findings: normal anatomy     POD#1 Progress Note: Nausea improving but still present early morning  Tolerating liquid diet without nausea or vomiting, pain adequately controlled on oral pain medication, ambulating without assistance, voiding well  s/p lap sleeve gastrectomy POD1 with stable post op course, resolved nausea after LR bolus and IV decadron with IM tigan  Encourage PO fluids, ambulation, and incentive spirometry    Will D/C home today with zofran    Admission Orders: Date/Time/Statement:   Admission Orders (From admission, onward)     Ordered        09/12/22 1116  Inpatient Admission  Once                      Orders Placed This Encounter   Procedures    Inpatient Admission     Standing Status:   Standing     Number of Occurrences:   1     Order Specific Question:   Level of Care     Answer:   Med Surg [16]     Order Specific Question:   Bed Type     Answer:   Bariatric [1]     Order Specific Question:   Estimated length of stay     Answer:   Inpatient Only Surgery     Vital Signs: BP (!) 166/105   Pulse 75   Temp 98 9 °F (37 2 °C)   Resp 17   Ht 5' 7" (1 702 m)   Wt 124 kg (273 lb 5 9 oz)   SpO2 97%   BMI 42 82 kg/m²     Pertinent Labs/Diagnostic Test Results:   No orders to display       Diet:Bariatric  Mobility: amb   DVT Prophylaxis: SCD    Medications/Pain Control:   Scheduled Medications:  acetaminophen, 975 mg, Oral, Q6H [facility]  amphetamine-dextroamphetamine, 30 mg, Oral, QAM  enoxaparin, 40 mg, Subcutaneous, Daily  famotidine, 20 mg, Intravenous, Q12H NATE  ketorolac, 15 mg, Intravenous, Q6H NATE  levothyroxine, 75 mcg, Oral, Daily  scopolamine, 1 patch, Transdermal, Once  simethicone, 80 mg, Oral, Q12H [facility]      Continuous IV Infusions:  lactated ringers, 100 mL/hr, Intravenous, Continuous      PRN Meds:  LORazepam, 0 5 mg, Intravenous, Q6H PRN  metoclopramide, 10 mg, Intravenous, Q6H PRN  morphine injection, 2 mg, Intravenous, Q2H PRN  morphine injection, 4 mg, Intravenous, Q2H PRN  ondansetron, 4 mg, Intravenous, Q6H PRN  oxyCODONE, 10 mg, Oral, Q4H PRN  oxyCODONE, 5 mg, Oral, Q4H PRN  phenol, 2 spray, Mouth/Throat, Q2H PRN  promethazine, 25 mg, Intramuscular, Q6H PRN        Network Utilization Review Department  ATTENTION: Please call with any questions or concerns to [phone] and carefully listen to the prompts so that you are directed to the right person  All voicemails are confidential   Chata Garciak all requests for admission clinical reviews, approved or denied determinations and any other requests to dedicated fax number below belonging to the campus where the patient is receiving treatment   List of dedicated fax numbers for the Facilities:  [address] DENIALS (Administrative/Medical Necessity) [phone]   [address] (Maternity/NICU/Pediatrics) [phone]   [address]  [address] [facility] [place] [address] [address] [place] [address] [address] Alisia Vincent [address]81 [address] [facility] [address] [facility] [phone]

## 2022-09-13 NOTE — DISCHARGE SUMMARY
Discharge Summary - [place] [address] 20 y o  female MRN: [id number]    Unit/Bed#: -01 Encounter: [id number]      Pre-Operative Diagnosis: Pre-Op Diagnosis Codes:     * Morbid obesity ([address] ) [E66 01]     * Obstructive sleep apnea (adult) (pediatric) [G47 33]    Post-Operative Diagnosis: Post-Op Diagnosis Codes:     * Morbid obesity ([address] ) [E66 01]     * Obstructive sleep apnea (adult) (pediatric) [G47 33]    Procedures Performed:  Procedure(s):  GASTRECTOMY SLEEVE LAPAROSCOPIC AND INTAOPERATIVE EGD    Surgeon: Chava Cho MD    See H & P for full details of admission and Operative Note for full details of operations performed  Hospital Course:  Patient was admitted for a Laparoscopic Sleeve Gastrectomy  Post operatively pain was controlled with oral analgesics and the patient is ambulating/micturating without difficulty  Vital signs and lab work were stable  The patient is tolerating clear liquid diet without nausea or vomiting  The patient is cleared for D/C by the surgeon on POD1  Patient was seen and examined prior to discharge  Provisions for Follow-Up Care:  See After Visit Summary for information related to follow-up care and home orders  Disposition: Home, in stable condition  Patient should refer to "Discharge Instructions" for further information  Planned Readmission: No    Discharge Medications:  See After Visit Summary for reconciled discharge medications provided to patient and family  Post Operative instructions: Reviewed with patient and/or family  This text is generated with voice recognition software  There may be translation, syntax,  or grammatical errors  If you have any questions, please contact the dictating provider       Signature:   Gomez Sanabria PA-C  Date: 9/13/2022 Time: 10:32 AM

## 2022-09-13 NOTE — PROGRESS NOTES
Progress Note - Bariatric Surgery   Yevgeniy Santos 20 y o  female MRN: [id number]  Unit/Bed#: -01 Encounter: [id number]      Subjective/Objective     Subjective:   Nausea improving but still present early morning  Tolerating liquid diet without nausea or vomiting, pain adequately controlled on oral pain medication, ambulating without assistance, voiding well, using incentive spirometer  Denies fevers, chills, sweats, SOB, CP, calf pain  Objective:    BP (!) 166/105   Pulse 75   Temp 98 9 °F (37 2 °C)   Resp 17   Ht 5' 7" (1 702 m)   Wt 124 kg (273 lb 5 9 oz)   SpO2 97%   BMI 42 82 kg/m²       Intake/Output Summary (Last 24 hours) at 9/13/2022 1029  Last data filed at 9/13/2022 1001  Gross per 24 hour   Intake 2480 ml   Output 20 ml   Net 2460 ml       Invasive Devices  Report    Peripheral Intravenous Line  Duration           Peripheral IV 09/12/22 Dorsal (posterior); Left Hand 1 day    Peripheral IV 09/12/22 Dorsal (posterior); Right Hand 1 day                ROS: 10-point system completed  All negative except see HPI  Physical Exam    General Appearance:    Alert, cooperative, no distress, appears stated age   Head:    Normocephalic, without obvious abnormality, atraumatic   Lungs:     respirations unlabored   Heart:    Regular rate and rhythm   Abdomen:     Soft, appropriate tenderness, non distended, incisions clean, dry, and intact   Extremities:   Extremities normal, atraumatic, no cyanosis or edema                   Lab, Imaging and other studies:I have personally reviewed pertinent lab results    , CBC: No results found for: WBC, HGB, HCT, MCV, PLT, ADJUSTEDWBC, MCH, MCHC, RDW, MPV, NRBC, CMP: No results found for: SODIUM, K, CL, CO2, ANIONGAP, BUN, CREATININE, GLUCOSE, CALCIUM, AST, ALT, ALKPHOS, PROT, BILITOT, EGFR - unable to get blood draw - ok per Dr Nadiya Leung    VTE Mechanical Prophylaxis: sequential compression device, lovenox    Assessment/Plan  21 yo F s/p lap sleeve gastrectomy POD1 with stable post op course, resolved nausea after LR bolus and IV decadron with IM tigan  Encourage PO fluids, ambulation, and incentive spirometry  Will D/C home today with zofran    Plan of care was discussed with patient and patient's nurse  Care plan discussed with Dr Lawyer Thomason  Dispo: Continue bariatric clear liquid diet, ambulation, incentive spirometry         Jerlean Phalen, PA-C  9/13/2022  10:29 AM

## 2022-09-13 NOTE — PLAN OF CARE
Problem: PAIN - ADULT  Goal: Verbalizes/displays adequate comfort level or baseline comfort level  Description: Interventions:  - Encourage patient to monitor pain and request assistance  - Assess pain using appropriate pain scale  - Administer analgesics based on type and severity of pain and evaluate response  - Implement non-pharmacological measures as appropriate and evaluate response  - Consider cultural and social influences on pain and pain management  - Notify physician/advanced practitioner if interventions unsuccessful or patient reports new pain  Outcome: Progressing     Problem: INFECTION - ADULT  Goal: Absence or prevention of progression during hospitalization  Description: INTERVENTIONS:  - Assess and monitor for signs and symptoms of infection  - Monitor lab/diagnostic results  - Monitor all insertion sites, i e  indwelling lines, tubes, and drains  - Monitor endotracheal if appropriate and nasal secretions for changes in amount and color  - [place] appropriate cooling/warming therapies per order  - Administer medications as ordered  - Instruct and encourage patient and family to use good hand hygiene technique  - Identify and instruct in appropriate isolation precautions for identified infection/condition  Outcome: Progressing  Goal: Absence of fever/infection during neutropenic period  Description: INTERVENTIONS:  - Monitor WBC    Outcome: Progressing     Problem: SAFETY ADULT  Goal: Patient will remain free of falls  Description: INTERVENTIONS:  - Educate patient/family on patient safety including physical limitations  - Instruct patient to call for assistance with activity   - Consult OT/PT to assist with strengthening/mobility   - Keep Call bell within reach  - Keep bed low and locked with side rails adjusted as appropriate  - Keep care items and personal belongings within reach  - Initiate and maintain comfort rounds  - Make Fall Risk Sign visible to staff  - Offer Toileting every 2 Hours, in advance of need  - Initiate/Maintain bed alarm  - Obtain necessary fall risk management equipment: socks  - Apply yellow socks and bracelet for high fall risk patients  - Consider moving patient to room near nurses station  Outcome: Progressing  Goal: Maintain or return to baseline ADL function  Description: INTERVENTIONS:  -  Assess patient's ability to carry out ADLs; assess patient's baseline for ADL function and identify physical deficits which impact ability to perform ADLs (bathing, care of mouth/teeth, toileting, grooming, dressing, etc )  - Assess/evaluate cause of self-care deficits   - Assess range of motion  - Assess patient's mobility; develop plan if impaired  - Assess patient's need for assistive devices and provide as appropriate  - Encourage maximum independence but intervene and supervise when necessary  - Involve family in performance of ADLs  - Assess for home care needs following discharge   - Consider OT consult to assist with ADL evaluation and planning for discharge  - Provide patient education as appropriate  Outcome: Progressing  Goal: Maintains/Returns to pre admission functional level  Description: INTERVENTIONS:  - Perform BMAT or MOVE assessment daily    - Set and communicate daily mobility goal to care team and patient/family/caregiver  - Collaborate with rehabilitation services on mobility goals if consulted  - Perform Range of Motion 2 times a day  - Reposition patient every 2 hours    - Dangle patient 2 times a day  - Stand patient 2 times a day  - Ambulate patient 2 times a day  - Out of bed to chair 2 times a day   - Out of bed for meals 2 times a day  - Out of bed for toileting  - Record patient progress and toleration of activity level   Outcome: Progressing     Problem: DISCHARGE PLANNING  Goal: Discharge to home or other facility with appropriate resources  Description: INTERVENTIONS:  - Identify barriers to discharge w/patient and caregiver  - Arrange for needed discharge resources and transportation as appropriate  - Identify discharge learning needs (meds, wound care, etc )  - Arrange for interpretive services to assist at discharge as needed  - Refer to Case Management Department for coordinating discharge planning if the patient needs post-hospital services based on physician/advanced practitioner order or complex needs related to functional status, cognitive ability, or social support system  Outcome: Progressing     Problem: DISCHARGE PLANNING  Goal: Discharge to home or other facility with appropriate resources  Description: INTERVENTIONS:  - Identify barriers to discharge w/patient and caregiver  - Arrange for needed discharge resources and transportation as appropriate  - Identify discharge learning needs (meds, wound care, etc )  - Arrange for interpretive services to assist at discharge as needed  - Refer to Case Management Department for coordinating discharge planning if the patient needs post-hospital services based on physician/advanced practitioner order or complex needs related to functional status, cognitive ability, or social support system  Outcome: Progressing     Problem: Knowledge Deficit  Goal: Patient/family/caregiver demonstrates understanding of disease process, treatment plan, medications, and discharge instructions  Description: Complete learning assessment and assess knowledge base    Interventions:  - Provide teaching at level of understanding  - Provide teaching via preferred learning methods  Outcome: Progressing

## 2022-09-13 NOTE — PLAN OF CARE
Problem: PAIN - ADULT  Goal: Verbalizes/displays adequate comfort level or baseline comfort level  Description: Interventions:  - Encourage patient to monitor pain and request assistance  - Assess pain using appropriate pain scale  - Administer analgesics based on type and severity of pain and evaluate response  - Implement non-pharmacological measures as appropriate and evaluate response  - Consider cultural and social influences on pain and pain management  - Notify physician/advanced practitioner if interventions unsuccessful or patient reports new pain  Outcome: Progressing     Problem: INFECTION - ADULT  Goal: Absence or prevention of progression during hospitalization  Description: INTERVENTIONS:  - Assess and monitor for signs and symptoms of infection  - Monitor lab/diagnostic results  - Monitor all insertion sites, i e  indwelling lines, tubes, and drains  - Monitor endotracheal if appropriate and nasal secretions for changes in amount and color  - [place] appropriate cooling/warming therapies per order  - Administer medications as ordered  - Instruct and encourage patient and family to use good hand hygiene technique  - Identify and instruct in appropriate isolation precautions for identified infection/condition  Outcome: Progressing     Problem: INFECTION - ADULT  Goal: Absence of fever/infection during neutropenic period  Description: INTERVENTIONS:  - Monitor WBC    Outcome: Progressing     Problem: SAFETY ADULT  Goal: Patient will remain free of falls  Description: INTERVENTIONS:  - Educate patient/family on patient safety including physical limitations  - Instruct patient to call for assistance with activity   - Consult OT/PT to assist with strengthening/mobility   - Keep Call bell within reach  - Keep bed low and locked with side rails adjusted as appropriate  - Keep care items and personal belongings within reach  - Initiate and maintain comfort rounds  - Make Fall Risk Sign visible to staff  - Offer Toileting every 2 Hours, in advance of need  - Initiate/Maintain alarm  - Obtain necessary fall risk management equipment:   - Apply yellow socks and bracelet for high fall risk patients  - Consider moving patient to room near nurses station  Outcome: Progressing     Problem: SAFETY ADULT  Goal: Maintain or return to baseline ADL function  Description: INTERVENTIONS:  -  Assess patient's ability to carry out ADLs; assess patient's baseline for ADL function and identify physical deficits which impact ability to perform ADLs (bathing, care of mouth/teeth, toileting, grooming, dressing, etc )  - Assess/evaluate cause of self-care deficits   - Assess range of motion  - Assess patient's mobility; develop plan if impaired  - Assess patient's need for assistive devices and provide as appropriate  - Encourage maximum independence but intervene and supervise when necessary  - Involve family in performance of ADLs  - Assess for home care needs following discharge   - Consider OT consult to assist with ADL evaluation and planning for discharge  - Provide patient education as appropriate  Outcome: Progressing     Problem: SAFETY ADULT  Goal: Maintains/Returns to pre admission functional level  Description: INTERVENTIONS:  - Perform BMAT or MOVE assessment daily    - Set and communicate daily mobility goal to care team and patient/family/caregiver  - Collaborate with rehabilitation services on mobility goals if consulted  - Perform Range of Motion 3 times a day  - Reposition patient every 2 hours    - Dangle patient 3 times a day  - Stand patient 3 times a day  - Ambulate patient 3 times a day  - Out of bed to chair 3 times a day   - Out of bed for meals 3 times a day  - Out of bed for toileting  - Record patient progress and toleration of activity level   Outcome: Progressing     Problem: DISCHARGE PLANNING  Goal: Discharge to home or other facility with appropriate resources  Description: INTERVENTIONS:  - Identify barriers to discharge w/patient and caregiver  - Arrange for needed discharge resources and transportation as appropriate  - Identify discharge learning needs (meds, wound care, etc )  - Arrange for interpretive services to assist at discharge as needed  - Refer to Case Management Department for coordinating discharge planning if the patient needs post-hospital services based on physician/advanced practitioner order or complex needs related to functional status, cognitive ability, or social support system  Outcome: Progressing     Problem: Knowledge Deficit  Goal: Patient/family/caregiver demonstrates understanding of disease process, treatment plan, medications, and discharge instructions  Description: Complete learning assessment and assess knowledge base    Interventions:  - Provide teaching at level of understanding  - Provide teaching via preferred learning methods  Outcome: Progressing

## 2022-09-13 NOTE — DISCHARGE INSTRUCTIONS
Bariatric/Weight Loss Surgery  Hospital Discharge Instructions  ACTIVITY:  Progress as feels comfortable - a good rule is:  if you are doing something and it begins to hurt, stop doing the activity  Walk every hour while at home  You may walk stairs if you do so slowly  You may shower 48 hours after surgery  Use your incentive spirometer 10 times per hour while awake for 1 week  Do NOT drive for 48 hours after surgery  No driving 24 hours after taking certain prescription pain medications   Examples of such medication are Percocet, Darvocet, Oxycodone, Tylenol #3, and Tylenol with Codeine  Follow your pharmacists orders  DIET  Stay on a liquid diet for 7 days after your surgery date, sipping slowly  Refer to your manual for examples of choices  Remember to keep your liquids sugar free or low calorie  You may have protein drinks  Make sure to drink 48 to 64 ounces per day of fluids  You may advance to a pureed diet one week after surgery as instructed by your diet progression pamphlet  Once you get approval from your surgeon at your first post operative visit you may advance to the soft diet  MEDICATIONS:  The abdominal nerve block will wear off during the first 1-2 days that you are home, and you may become sore  Continue to take your Tylenol and your pain medication as instructed  Start vitamins and minerals when you get home  Anti-acid Medication as per prescription  Lovenox injections as directed  Other medications as indicated on the Physician Patient Discharge Instructions form given to you at the time of discharge  Make sure that you are splitting your pill or tablet medications in halves or fourths or even crushing them before you take them  Capsules should be opened and mixed with water or jello  You need to do this for at least 4 weeks after surgery  Eventually you will be able to take your medications the regular way as they were prescribed     You will need to consult with your Family Doctor in regards to all your prescribed medication, particularly those for blood pressure and diabetes  As you lose weight, medical conditions may change, requiring an alteration or elimination of the drug dose  DO NOT TAKE BIRTH CONTROL(BC) MEDICATIONS, INSERT BC VAGINAL RINGS, OR PLACE IUD OR ANY OTHER BC METHODS UNTIL 31 DAYS FROM DAY OF DISCHARGE FROM HOSPITAL  THIS PLACES YOU AT HIGH RISK FOR A POTENTIALLY LIFE THREATENING BLOOD CLOT  Remember to always use barrier methods for birth control and speak to your GYN about using two forms of birth control to start 31 days after surgery  It is very important to avoid pregnancy until at least 18-24 months after surgery  INCISION CARE  You may shower and get incisions wet 2 days after surgery  No soaking tub baths or swimming for 30 days after surgery  Keep abdominal area and incisions clean  Use soap and water to create a good lather and rinse off  Do not scrub incisions  If you have a drain, empty the drain as the nurses instructed  FOLLOW-UP APPOINTMENT should be made for one week after discharge  Call surgeons office at [phone] to schedule an appointment  CALL YOUR DOCTOR FOR:  pain not controlled by pain medications, a temperature greater than 101 5° F, any increase or change in drainage or redness from any incision, any vomiting or inability to keep liquids down, shortness of breath, shoulder pain, or bleeding        Letter and Information for Patient's Primary Health Care Provider      Dear Leroy Amato,       Your patient had bariatric surgery on this admission to [address] [place] [address]  Due to the restrictive and/or malabsorptive nature of their procedure our surgeons recommend routine lab studies  Your patients surgeon will provide orders initially and ask that they continue to follow-up with us for life even if they see you  As time moves on some patients prefer to follow-up only with their family doctor   We ask that you discuss this regular work-up with them when they make an appointment to see you  *The lab studies recommended to best identify deficiencies are: CBC, CMP, Lipid Profile, Fe, TIBC, %Sat, Vitamin D, Folate, B12, Whole Blood Thiamine, Vitamin A, PTH, Zinc and Ferritin  We recommend HgbA1C for diabetics  *These studies should be done minimally at 6 months and a year post-operatively and then yearly thereafter  *Recommended Daily Supplements: Please see the attached Vitamin Sheet    If your patient has any dietary or psycho-social concerns, they can follow-up with our Team Dietitian and Licensed Clinical   They can reach these team members by calling the [address] [place] [address] [facility]  We also encourage them to follow up at our regularly scheduled support groups or join our I-lighting at [phone] Patient Forum  For your convenience we have also included a list of medicines that should be avoided after weight loss surgery and a list of the vitamin and minerals they should be taking for the rest of their lives  The patients are provided this information as well  The [facility] Bariatric Team would like to thank you for the opportunity to assist in the care of your patient  Feel free to contact us with any questions by calling the [address] [place] [address] Weight Management office at [phone]     Sincerely,         [address] [place] [address] [facility] Team    Additional Information for Providers and Patients                      Vitamins After Marquita en Y Gastric Bypass or Sleeve Gastrectomy Surgery    Due to the decreased absorption of nutrients and the decreased amount of food eaten it is difficult to obtain all the nutrients needed consuming food  We recommend a bariatric formulated vitamin for the rest of your life    If you wish to use an over the counter vitamins please understand you may not get all the recommended daily requirements  Use the following guidelines for over the counter vitamins  Multivitamins   We recommend 2 chewable multivitamins with iron (do not take gummy chewable as they do not contain thiamine)    You can continue with the chewable or take any well formulated, high potency multivitamin containing 22 nutrients including zinc and copper  If you decide to take a bariatric vitamin the number of vitamins that you need to take will vary  Refer to the chart provided at team meeting    Calcium - Calcium is absorbed in the part of the small bowel that is bypassed in gastric bypass patients  In addition, as you lose weight, you are more at risk for loss of bone density leading to osteoporosis  The best form of calcium is Calcium Citrate  This form of calcium is better absorbed after your surgery  Recommended daily dose is 1500 mg  Take 500 mg in (3) divided doses  You can only absorb about 500 mg at a time  We recommend 2000 IU of vitamin D3 per day, in addition to what is in your calcium supplement  If you were instructed to take a higher dose based on a deficiency, then continue to take the higher vitamin D dose  Iron - Iron is absorbed in the part of the small bowel that is bypassed  You will need to take extra iron in addition to what is already in the multivitamin if you are a menstruating woman (25-45 mg of additional elemental iron) or have been diagnosed with an iron deficiency  We recommend iron in the form of Ferrous Fumarate with Vitamin C  Follow the instructions on the package or bottle unless your physician has given other dosage amounts  B12 (Cyanocobalamin) may also be decreased  Additional Vitamin B12 is recommended if you are not taking a bariatric vitamin  Take 350 to 500 micrograms (mcg) per day of B12 (Cyanocobalamin) in a sublingual form (for under the tongue)     Note:  Calcium interferes with the absorption of iron, so it is recommended that you take the calcium at least 2 hours apart from iron  The tannins in tea also interfere with the absorption of iron  Note: Anti-ulcer medications interfere with the absorption of calcium iron and B12  Space your anti-ulcer medication 2 hours apart from your vitamins  * Based on the recommendations of the ASMBS and the National Osteoporosis foundation    Non-steroidal anti-inflammatory drugs or medications containing them  You should take caution or avoid these medications as they could harm your pouch or sleeve  **This is a sample list and is not all inclusive  Please read labels carefully  **      Non Steroidal anti-inflammatory drugs  Advil (ibuprofen)  Aleve (naproxen)  Anaprox (naproxen)  Ansaid (flurbiprofen)  Azolid (phenylbutazone)  Bextra (valdecoxib)  Butazolidin (phenylbutazone)  Celebrex (celecoxib)  Clinoril (sulindac)  Dolobid (diflunisal)  Excedrin IB (ibuprofen)  Feldene (piroxicam)  Ibuprin (ibuprofen)  Indocin (indomethacin)  Lodine (etodolac)  Meclomen (meclofenamate)  Midol IB (ibuprofen)  Motrin IB (ibuprofen)  Nalfon (fenoprofen)  Naprosyn (naproxen)  Nuprin (ibuprofen)  Orudis (ketoprofen)  Oruvail (ketoprofen)  Pamprin - IB (ibuprofen)  Ponstel (mefenamic acid)  Rexolate (sodium thiosalicylate)  Tandearil (oxyphenbutazone)  Tolectin (tolmetin)  Voltaren (diclofenac)      Barbiturate  Fiorinal (butalbital/aspirin/caffeine)    Salicylates  Amigesic (salsalate)  Anacin (aspirin)  Arthropan (choline salicylate)  Ascriptin (buffered aspirin)  Aspirin (aspirin)  Aspirtab (aspirin)  Bufferin (buffered aspirin)  Disalcid (salsalate)  Ecotrin (aspirin)  Uracel (sodium salicylate)    Analgesics  Equagesic (meprobamate/aspirin)  Micrainin (meprobamate/aspirin)  Percodan (oxycodone/aspirin)    OTC  Pepto-Bismol®  Fariba-Or[place]®  Excedrin®    For Gastric Bypass Patients  Extended Release Medications  Sustained Release Medications  Time Released Medications

## 2022-09-14 ENCOUNTER — TELEPHONE (OUTPATIENT)
Dept: BARIATRICS | Facility: CLINIC | Age: 22
End: 2022-09-14

## 2022-09-14 NOTE — UTILIZATION REVIEW
Sherryll Dandy, PA-C   Physician Assistant   Bariatrics   Discharge Summary      Attested   Date of Service:  9/13/2022 10:32 AM                 Attested            Attestation signed by Darian Estrada MD at 9/14/2022  9:39 AM     Teaching Physician Statement   I have seen and examined the patient myself  I have discussed the patient with the PA  I agree with the PA's documented findings and plan of care             Maria Teresa Mesa MD, FACS, [facility]   9/14/2022     9:39 AM                         Show:Clear all  []Manual[x]Template[]Copied    Added by:  [x]Scarlet Vizcaino      []Poonam for details      Discharge Summary - Kin Miguel 20 y o  female MRN: [id number]     Unit/Bed#: -01 Encounter: [id number]        Pre-Operative Diagnosis: Pre-Op Diagnosis Codes:     * Morbid obesity ([address] ) [E66 01]     * Obstructive sleep apnea (adult) (pediatric) [G47 33]     Post-Operative Diagnosis: Post-Op Diagnosis Codes:     * Morbid obesity ([address] ) [E66 01]     * Obstructive sleep apnea (adult) (pediatric) [G47 33]     Procedures Performed:  Procedure(s):  GASTRECTOMY SLEEVE LAPAROSCOPIC AND INTAOPERATIVE EGD     Surgeon: Darian Estrada MD     See H & P for full details of admission and Operative Note for full details of operations performed       Hospital Course:  Patient was admitted for a Laparoscopic Sleeve Gastrectomy  Post operatively pain was controlled with oral analgesics and the patient is ambulating/micturating without difficulty  Vital signs and lab work were stable  The patient is tolerating clear liquid diet without nausea or vomiting  The patient is cleared for D/C by the surgeon on POD1      Patient was seen and examined prior to discharge        Provisions for Follow-Up Care:  See After Visit Summary for information related to follow-up care and home orders        Disposition: Home, in stable condition   Patient should refer to "Discharge Instructions" for further information      Planned Readmission: No     Discharge Medications:  See After Visit Summary for reconciled discharge medications provided to patient and family        Post Operative instructions: Reviewed with patient and/or family      This text is generated with voice recognition software  There may be translation, syntax,  or grammatical errors  If you have any questions, please contact the dictating provider       Signature:   Ava Moser PA-C  Date: 9/13/2022 Time: 10:32 AM                Cosigned by:  Jocelynn Restrepo MD at 9/14/2022  9:39 AM       Revision History

## 2022-09-15 ENCOUNTER — TRANSITIONAL CARE MANAGEMENT (OUTPATIENT)
Dept: FAMILY MEDICINE CLINIC | Facility: CLINIC | Age: 22
End: 2022-09-15

## 2022-09-20 PROCEDURE — 88342 IMHCHEM/IMCYTCHM 1ST ANTB: CPT | Performed by: PATHOLOGY

## 2022-09-20 PROCEDURE — 88307 TISSUE EXAM BY PATHOLOGIST: CPT | Performed by: PATHOLOGY

## 2022-09-23 ENCOUNTER — OFFICE VISIT (OUTPATIENT)
Dept: BARIATRICS | Facility: CLINIC | Age: 22
End: 2022-09-23

## 2022-09-23 VITALS
RESPIRATION RATE: 16 BRPM | HEIGHT: 67 IN | SYSTOLIC BLOOD PRESSURE: 122 MMHG | WEIGHT: 259 LBS | HEART RATE: 82 BPM | DIASTOLIC BLOOD PRESSURE: 76 MMHG | BODY MASS INDEX: 40.65 KG/M2

## 2022-09-23 DIAGNOSIS — Z98.84 S/P LAPAROSCOPIC SLEEVE GASTRECTOMY: Primary | ICD-10-CM

## 2022-09-23 DIAGNOSIS — K91.2 POSTSURGICAL MALABSORPTION: ICD-10-CM

## 2022-09-23 DIAGNOSIS — E03.9 ACQUIRED HYPOTHYROIDISM: ICD-10-CM

## 2022-09-23 PROCEDURE — 99024 POSTOP FOLLOW-UP VISIT: CPT | Performed by: SURGERY

## 2022-09-23 RX ORDER — LEVOTHYROXINE SODIUM 0.07 MG/1
75 TABLET ORAL DAILY
Qty: 90 TABLET | Refills: 1 | Status: SHIPPED | OUTPATIENT
Start: 2022-09-23

## 2022-09-23 NOTE — PROGRESS NOTES
FIRST POST-OPERATIVE VISIT - BARIATRIC SURGERY  Rajinder Núñez 20 y o  female MRN: [id number]  Unit/Bed#:  Encounter: [id number]      HPI:  Rajinder Núñez is a 20 y o  female who presents for follow up s/p laparoscopic sleeve gastrectomy  Tolerating adequate PO intake, ambulating regularly, using IS, voiding, +F/BM  Denies n/v/cp/sob/dizziness    Review of Systems   Constitutional: Negative  Respiratory: Negative  Cardiovascular: Negative  Gastrointestinal: Negative  Musculoskeletal: Negative  Neurological: Negative  All other systems reviewed and are negative  Historical Information   Past Medical History:   Diagnosis Date    ADHD (attention deficit hyperactivity disorder)     Hypothyroid     Morbid obesity with BMI of 45 0-49 9, adult ([facility] [address] [facility] )      Past Surgical History:   Procedure Laterality Date    EGD      WI LAP, JULIEN RESTRICT PROC, LONGITUDINAL GASTRECTOMY N/A 9/12/2022    Procedure: GASTRECTOMY SLEEVE LAPAROSCOPIC AND Dorothey Balzarine EGD;  Surgeon: Wen Hooks MD;  Location: [facility];  Service: Bariatrics    WISDOM TOOTH EXTRACTION      WRIST SURGERY Right     secondary to a fracture     Social History   Social History     Substance and Sexual Activity   Alcohol Use Yes    Comment: 1-2 on weekends     Social History     Substance and Sexual Activity   Drug Use Never     Social History     Tobacco Use   Smoking Status Never Smoker   Smokeless Tobacco Never Used     Family History: non-contributory    Meds/Allergies   all medications and allergies reviewed  No Known Allergies    Objective     Current Vitals:   /76   Pulse 82   Resp 16   Ht 5' 6 9" (1 699 m)   Wt 117 kg (259 lb)   BMI 40 69 kg/m²       Physical Exam  Vitals reviewed  Constitutional:       Appearance: She is well-developed  HENT:      Head: Normocephalic  Eyes:      Extraocular Movements: Extraocular movements intact  Cardiovascular:      Rate and Rhythm: Normal rate     Pulmonary:      Effort: Pulmonary effort is normal    Abdominal:      General: There is no distension  Palpations: Abdomen is soft  There is no mass  Hernia: No hernia is present  Comments: Incisions c/d/i  No signs of infection   Musculoskeletal:         General: Normal range of motion  Cervical back: Normal range of motion  Skin:     General: Skin is warm and dry  Neurological:      Mental Status: She is alert and oriented to person, place, and time  Psychiatric:         Mood and Affect: Mood normal          Behavior: Behavior normal          Thought Content: Thought content normal          Judgment: Judgment normal          Assessment/Plan:    Patient is presenting for the first postoperative visit, patient hospital stay was uneventful without any complications, patient is doing well, has no complaints, is taking vitamins as instructed, currently tolerating the blenderized diet, will advance to soft diet  The patient is also tolerating Lovenox injections and will be completing the remaining doses  Patient will also be meeting with our dietician today to review her vitamin and mineral supplements and also go over her diet and emphasize postoperative commitment and compliance  The patient was also instructed to start exercising on a regular basis  However, I recommended no heavy lifting, or weight exercises for another 2 weeks  F/U at the 5 week and 3 month appointments  Labs for the 3 month appointment were ordered  Patient was instructed to call if develops nausea, vomiting, fever or chills   Pathology was also reviewed and was normal

## 2022-09-23 NOTE — PROGRESS NOTES
Weight Management Nutrition Education    Diagnosis: Obesity    Bariatric Surgeon: Dr Rene Favors     Surgery: Gastric Bypass Laparoscopic    Class: first post op note    Topics discussed today include:     fluid goals post op, protein goals post op, constipation, chew food well, exercise, avoidance of alcohol, PPI use, diet progression, hypoglycemia, dumping syndrome, protein supplems, vitamin/mineral supplements and calcium supplements    Patient was able to verbalize basic diet (protein, fluid, vitamin and mineral) recommendations and possible nutrition-related complications  Yes   Protein and Fluids adequate  Protein Drinks: 2   Fluids: 48-64 oz water   Started puree diet and tolerating:  Yogurt, oatmeal, etc   Measuring 1/4 c and following 30/60 rule   Eating slow and sipping fluids:   Started chewable multivitamin (Bariatric Pal) and calcium citrate: TID  To start soft diet next week

## 2022-10-03 ENCOUNTER — OFFICE VISIT (OUTPATIENT)
Dept: FAMILY MEDICINE CLINIC | Facility: CLINIC | Age: 22
End: 2022-10-03
Payer: COMMERCIAL

## 2022-10-03 VITALS
HEART RATE: 98 BPM | SYSTOLIC BLOOD PRESSURE: 118 MMHG | DIASTOLIC BLOOD PRESSURE: 70 MMHG | WEIGHT: 259 LBS | HEIGHT: 67 IN | TEMPERATURE: 97.9 F | OXYGEN SATURATION: 98 % | BODY MASS INDEX: 40.65 KG/M2

## 2022-10-03 DIAGNOSIS — Z76.89 ENCOUNTER FOR SUPPORT AND COORDINATION OF TRANSITION OF CARE: Primary | ICD-10-CM

## 2022-10-03 PROCEDURE — 99213 OFFICE O/P EST LOW 20 MIN: CPT | Performed by: FAMILY MEDICINE

## 2022-10-03 NOTE — ASSESSMENT & PLAN NOTE
S/P Laparoscopic Sleeve Gastrectomy on 09/12/2022  Postop course going well denies any issues or concerns today  Down 14 lb  Has follow-up appoint with bariatric step this month

## 2022-10-03 NOTE — PROGRESS NOTES
Name: Ariela Forbes      : 2000      MRN: [id number]  Encounter Provider: Chikis Varghese MD  Encounter Date: 10/3/2022   Encounter department: FAMILY PRACTICE AT [address] [address]  BMI 40 0-44 9, adult [facility])  Assessment & Plan:  S/P Laparoscopic Sleeve Gastrectomy on 2022  Postop course going well denies any issues or concerns today  Down 14 lb  Has follow-up appoint with bariatric step this month  Op and hospital notes reviewed  Subjective      Presents to the office for hospital follow-up  She is status post bariatric surgery on 2022  She is doing well and recovering without any issues  She has follow-up appointment with bariatrics this month  Review of Systems   Constitutional: Negative for chills and fever  HENT: Negative for ear pain and sore throat  Eyes: Negative for pain and visual disturbance  Respiratory: Negative for cough and shortness of breath  Cardiovascular: Negative for chest pain and palpitations  Gastrointestinal: Negative for abdominal pain and vomiting  Genitourinary: Negative for dysuria and hematuria  Musculoskeletal: Negative for arthralgias and back pain  Skin: Negative for color change and rash  Neurological: Negative for seizures and syncope  All other systems reviewed and are negative        Current Outpatient Medications on File Prior to Visit   Medication Sig    amphetamine-dextroamphetamine (ADDERALL XR) 30 MG 24 hr capsule Take 1 capsule (30 mg total) by mouth every morning Max Daily Amount: 30 mg    levothyroxine 75 mcg tablet Take 1 tablet (75 mcg total) by mouth daily    Multiple Vitamin (multivitamin) capsule Take 1 capsule by mouth daily    pantoprazole (PROTONIX) 20 mg tablet Take 2 tablets (40 mg total) by mouth daily    enoxaparin (Lovenox) 40 mg/0 4 mL Inject 0 4 mL (40 mg total) under the skin in the morning for 13 days (Patient not taking: Reported on 10/3/2022)    hydrOXYzine pamoate (VISTARIL) 25 mg capsule Take 1 capsule (25 mg total) by mouth 3 (three) times a day as needed for anxiety (Patient not taking: Reported on 10/3/2022)    ondansetron (Zofran ODT) 4 mg disintegrating tablet Take 1 tablet (4 mg total) by mouth every 6 (six) hours as needed for nausea or vomiting (Patient not taking: No sig reported)    oxyCODONE (Roxicodone) 5 immediate release tablet Take 1 tablet (5 mg total) by mouth every 4 (four) hours as needed for moderate pain Max Daily Amount: 30 mg (Patient not taking: No sig reported)       Objective     /70 (BP Location: Left arm, Patient Position: Sitting)   Pulse 98   Temp 97 9 °F (36 6 °C)   Ht 5' 7" (1 702 m)   Wt 117 kg (259 lb)   SpO2 98%   BMI 40 57 kg/m²     Physical Exam  Vitals and nursing note reviewed  Constitutional:       General: She is not in acute distress  Appearance: Normal appearance  She is not ill-appearing, toxic-appearing or diaphoretic  Eyes:      General:         Right eye: No discharge  Left eye: No discharge  Extraocular Movements: Extraocular movements intact  Conjunctiva/sclera: Conjunctivae normal    Cardiovascular:      Rate and Rhythm: Normal rate  Pulmonary:      Effort: Pulmonary effort is normal    Musculoskeletal:      Cervical back: Normal range of motion and neck supple  Neurological:      Mental Status: She is alert and oriented to person, place, and time  Psychiatric:         Mood and Affect: Mood normal          Behavior: Behavior normal          Thought Content:  Thought content normal          Judgment: Judgment normal        Jerry Puga MD

## 2022-12-12 PROBLEM — Z48.815 ENCOUNTER FOR SURGICAL AFTERCARE FOLLOWING SURGERY OF DIGESTIVE SYSTEM: Status: ACTIVE | Noted: 2022-12-12

## 2022-12-12 PROBLEM — K91.2 POSTSURGICAL MALABSORPTION: Status: ACTIVE | Noted: 2022-12-12

## 2023-01-04 ENCOUNTER — OFFICE VISIT (OUTPATIENT)
Dept: FAMILY MEDICINE CLINIC | Facility: CLINIC | Age: 23
End: 2023-01-04

## 2023-01-04 VITALS
SYSTOLIC BLOOD PRESSURE: 128 MMHG | BODY MASS INDEX: 33.59 KG/M2 | DIASTOLIC BLOOD PRESSURE: 80 MMHG | TEMPERATURE: 98.5 F | WEIGHT: 214 LBS | OXYGEN SATURATION: 98 % | HEART RATE: 91 BPM | HEIGHT: 67 IN

## 2023-01-04 DIAGNOSIS — F90.8 ATTENTION DEFICIT HYPERACTIVITY DISORDER (ADHD), OTHER TYPE: Primary | ICD-10-CM

## 2023-01-04 DIAGNOSIS — M25.562 CHRONIC PAIN OF LEFT KNEE: ICD-10-CM

## 2023-01-04 DIAGNOSIS — G89.29 CHRONIC PAIN OF LEFT KNEE: ICD-10-CM

## 2023-01-04 DIAGNOSIS — Z79.899 LONG-TERM CURRENT USE OF STIMULANT: ICD-10-CM

## 2023-01-04 DIAGNOSIS — E03.9 ACQUIRED HYPOTHYROIDISM: ICD-10-CM

## 2023-01-04 RX ORDER — BIOTIN 10 MG
TABLET ORAL
COMMUNITY

## 2023-01-04 NOTE — PROGRESS NOTES
Name: Corin Burrows      : 2000      MRN: [id number]  Encounter Provider: Santana Canales DO  Encounter Date: 2023   Encounter department: [address] [place] [address]     1  Attention deficit hyperactivity disorder (ADHD), other type    2  Long-term current use of stimulant    3  Acquired hypothyroidism    4  Chronic pain of left knee  -     Ambulatory Referral to Physical Therapy; Future           Subjective      Chief Complaint   Patient presents with   • Follow-up     Controlled sub        This is the first time I am seeing this pt- she follows with other provider here  Per refill messages last month, pt was reminded that she was overdue for controlled substance use f/u- for adderall  controlled substance agreement was signed May 2022  Per Med list, pt indicates she is not on protonix or a MVI- per chart review at bariatrics postop appt in September s/p sleeve 2022, she was instructed to remain on PPI and to take chewable multivitamin (Bariatric Pal) and calcium citrate: TID   States she has been taking the chewable MVI (only taking once a day) and the protonix, but has not been taking calcium citrate  Also c/o "there is something going on with this knee"- rubs medial left knee - states "feel something moving around and hurts on and off- pt states ongoing for past year or more- "can be walking and it just pops, hurts so bad"  States had seen ortho for her Right knee in the past and had MRI - states then had been referred to another ortho "never went" per pt     Review of Systems   Psychiatric/Behavioral: Negative for agitation, behavioral problems, confusion, decreased concentration, dysphoric mood, hallucinations, self-injury, sleep disturbance and suicidal ideas  The patient is not nervous/anxious          Current Outpatient Medications on File Prior to Visit   Medication Sig   • amphetamine-dextroamphetamine (ADDERALL XR) 30 MG 24 hr capsule Take 1 capsule (30 mg total) by mouth every morning Max Daily Amount: 30 mg   • levothyroxine 75 mcg tablet Take 1 tablet (75 mcg total) by mouth daily   • Multiple Vitamin (multivitamin) capsule Take 1 capsule by mouth daily   • pantoprazole (PROTONIX) 20 mg tablet Take 2 tablets (40 mg total) by mouth daily   • Multiple Vitamins-Minerals (Multivitamin Adult) CHEW Chew       Objective     /80   Pulse 91   Temp 98 5 °F (36 9 °C) (Tympanic)   Ht 5' 7" (1 702 m)   Wt 97 1 kg (214 lb)   SpO2 98%   BMI 33 52 kg/m²     Physical Exam  Vitals and nursing note reviewed  Constitutional:       General: She is not in acute distress  Appearance: She is well-developed  She is not ill-appearing, toxic-appearing or diaphoretic  HENT:      Head: Normocephalic and atraumatic  Mouth/Throat:      Pharynx: Uvula midline  Neck:      Trachea: Phonation normal    Pulmonary:      Effort: Pulmonary effort is normal    Musculoskeletal:      Right knee: No swelling or deformity  Left knee: No swelling, deformity, effusion, erythema, bony tenderness or crepitus  Normal range of motion  Tenderness (generalized) present  No LCL laxity, MCL laxity, ACL laxity or PCL laxity  Normal alignment, normal meniscus and normal patellar mobility  Instability Tests: Anterior drawer test negative  Posterior drawer test negative  Anterior Lachman test negative  Medial Jimenez test negative and lateral Jimenez test negative  Skin:     Coloration: Skin is not pale  Neurological:      Mental Status: She is alert and oriented to person, place, and time  Gait: Gait normal    Psychiatric:         Attention and Perception: Attention normal          Mood and Affect: Mood normal          Speech: Speech normal          Behavior: Behavior normal  Behavior is cooperative  Thought Content:  Thought content normal          Cognition and Memory: Cognition normal        Alexa Powers DO

## 2023-01-09 PROBLEM — Z79.899 LONG-TERM CURRENT USE OF STIMULANT: Status: ACTIVE | Noted: 2023-01-09

## 2023-01-24 DIAGNOSIS — F90.0 ATTENTION DEFICIT HYPERACTIVITY DISORDER (ADHD), PREDOMINANTLY INATTENTIVE TYPE: ICD-10-CM

## 2023-01-26 RX ORDER — DEXTROAMPHETAMINE SACCHARATE, AMPHETAMINE ASPARTATE MONOHYDRATE, DEXTROAMPHETAMINE SULFATE AND AMPHETAMINE SULFATE 7.5; 7.5; 7.5; 7.5 MG/1; MG/1; MG/1; MG/1
30 CAPSULE, EXTENDED RELEASE ORAL EVERY MORNING
Qty: 30 CAPSULE | Refills: 0 | Status: SHIPPED | OUTPATIENT
Start: 2023-01-26

## 2023-04-03 DIAGNOSIS — F90.0 ATTENTION DEFICIT HYPERACTIVITY DISORDER (ADHD), PREDOMINANTLY INATTENTIVE TYPE: ICD-10-CM

## 2023-04-03 NOTE — TELEPHONE ENCOUNTER
Medication:Adderall  PDMP Not Done No authorization  Active agreement on file -Yes    First script sent to Different pharmacy and it is on back order, can this be sent to [facility]

## 2023-04-03 NOTE — TELEPHONE ENCOUNTER
3/26 was sent to a different pharmacy but unable to fill medication on back order please send to [address]

## 2023-04-04 RX ORDER — DEXTROAMPHETAMINE SACCHARATE, AMPHETAMINE ASPARTATE MONOHYDRATE, DEXTROAMPHETAMINE SULFATE AND AMPHETAMINE SULFATE 7.5; 7.5; 7.5; 7.5 MG/1; MG/1; MG/1; MG/1
30 CAPSULE, EXTENDED RELEASE ORAL EVERY MORNING
Qty: 30 CAPSULE | Refills: 0 | Status: SHIPPED | OUTPATIENT
Start: 2023-04-04

## 2023-04-29 DIAGNOSIS — E03.9 ACQUIRED HYPOTHYROIDISM: Primary | ICD-10-CM

## 2023-05-10 DIAGNOSIS — F90.0 ATTENTION DEFICIT HYPERACTIVITY DISORDER (ADHD), PREDOMINANTLY INATTENTIVE TYPE: ICD-10-CM

## 2023-05-10 NOTE — TELEPHONE ENCOUNTER
Requested medication(s) are due for refill today: Yes  Patient has already received a courtesy refill: No  Other reason request has been forwarded to provider: failed protocol    Psychiatry:  Stimulants/ADHD Failed 05/10/2023 12:44 PM   Protocol Details  This refill cannot be delegated    Valid encounter within last 6 months

## 2023-05-11 RX ORDER — DEXTROAMPHETAMINE SACCHARATE, AMPHETAMINE ASPARTATE MONOHYDRATE, DEXTROAMPHETAMINE SULFATE AND AMPHETAMINE SULFATE 7.5; 7.5; 7.5; 7.5 MG/1; MG/1; MG/1; MG/1
30 CAPSULE, EXTENDED RELEASE ORAL EVERY MORNING
Qty: 30 CAPSULE | Refills: 0 | Status: SHIPPED | OUTPATIENT
Start: 2023-05-11

## 2023-07-18 DIAGNOSIS — F90.0 ATTENTION DEFICIT HYPERACTIVITY DISORDER (ADHD), PREDOMINANTLY INATTENTIVE TYPE: ICD-10-CM

## 2023-07-20 RX ORDER — DEXTROAMPHETAMINE SACCHARATE, AMPHETAMINE ASPARTATE MONOHYDRATE, DEXTROAMPHETAMINE SULFATE AND AMPHETAMINE SULFATE 7.5; 7.5; 7.5; 7.5 MG/1; MG/1; MG/1; MG/1
30 CAPSULE, EXTENDED RELEASE ORAL EVERY MORNING
Qty: 30 CAPSULE | Refills: 0 | Status: SHIPPED | OUTPATIENT
Start: 2023-07-20

## 2023-08-02 ENCOUNTER — LAB (OUTPATIENT)
Dept: LAB | Facility: CLINIC | Age: 23
End: 2023-08-02
Payer: COMMERCIAL

## 2023-08-02 ENCOUNTER — OFFICE VISIT (OUTPATIENT)
Dept: FAMILY MEDICINE CLINIC | Facility: CLINIC | Age: 23
End: 2023-08-02
Payer: COMMERCIAL

## 2023-08-02 VITALS
SYSTOLIC BLOOD PRESSURE: 126 MMHG | DIASTOLIC BLOOD PRESSURE: 78 MMHG | HEIGHT: 67 IN | BODY MASS INDEX: 28.16 KG/M2 | TEMPERATURE: 98.9 F | HEART RATE: 90 BPM | OXYGEN SATURATION: 99 % | WEIGHT: 179.4 LBS

## 2023-08-02 DIAGNOSIS — Z98.84 HISTORY OF BARIATRIC SURGERY: ICD-10-CM

## 2023-08-02 DIAGNOSIS — E03.9 ACQUIRED HYPOTHYROIDISM: ICD-10-CM

## 2023-08-02 DIAGNOSIS — F90.8 ATTENTION DEFICIT HYPERACTIVITY DISORDER (ADHD), OTHER TYPE: Primary | ICD-10-CM

## 2023-08-02 DIAGNOSIS — J01.10 ACUTE FRONTAL SINUSITIS, RECURRENCE NOT SPECIFIED: ICD-10-CM

## 2023-08-02 DIAGNOSIS — Z98.84 S/P LAPAROSCOPIC SLEEVE GASTRECTOMY: ICD-10-CM

## 2023-08-02 DIAGNOSIS — K91.2 POSTSURGICAL MALABSORPTION: ICD-10-CM

## 2023-08-02 DIAGNOSIS — Z79.899 LONG-TERM CURRENT USE OF STIMULANT: ICD-10-CM

## 2023-08-02 LAB
25(OH)D3 SERPL-MCNC: 26.3 NG/ML (ref 30–100)
ALBUMIN SERPL BCP-MCNC: 3.7 G/DL (ref 3.5–5)
ALP SERPL-CCNC: 68 U/L (ref 46–116)
ALT SERPL W P-5'-P-CCNC: 15 U/L (ref 12–78)
ANION GAP SERPL CALCULATED.3IONS-SCNC: 5 MMOL/L
AST SERPL W P-5'-P-CCNC: 10 U/L (ref 5–45)
BILIRUB SERPL-MCNC: 0.87 MG/DL (ref 0.2–1)
BUN SERPL-MCNC: 7 MG/DL (ref 5–25)
CALCIUM SERPL-MCNC: 9.4 MG/DL (ref 8.3–10.1)
CHLORIDE SERPL-SCNC: 109 MMOL/L (ref 96–108)
CHOLEST SERPL-MCNC: 141 MG/DL
CO2 SERPL-SCNC: 26 MMOL/L (ref 21–32)
CREAT SERPL-MCNC: 0.8 MG/DL (ref 0.6–1.3)
ERYTHROCYTE [DISTWIDTH] IN BLOOD BY AUTOMATED COUNT: 13.4 % (ref 11.6–15.1)
FERRITIN SERPL-MCNC: 25 NG/ML (ref 11–307)
GFR SERPL CREATININE-BSD FRML MDRD: 103 ML/MIN/1.73SQ M
GLUCOSE P FAST SERPL-MCNC: 82 MG/DL (ref 65–99)
HCT VFR BLD AUTO: 44 % (ref 34.8–46.1)
HDLC SERPL-MCNC: 75 MG/DL
HGB BLD-MCNC: 14.3 G/DL (ref 11.5–15.4)
IRON SATN MFR SERPL: 29 % (ref 15–50)
IRON SERPL-MCNC: 108 UG/DL (ref 50–170)
LDLC SERPL CALC-MCNC: 56 MG/DL (ref 0–100)
MCH RBC QN AUTO: 31.2 PG (ref 26.8–34.3)
MCHC RBC AUTO-ENTMCNC: 32.5 G/DL (ref 31.4–37.4)
MCV RBC AUTO: 96 FL (ref 82–98)
NONHDLC SERPL-MCNC: 66 MG/DL
PLATELET # BLD AUTO: 264 THOUSANDS/UL (ref 149–390)
PMV BLD AUTO: 9.8 FL (ref 8.9–12.7)
POTASSIUM SERPL-SCNC: 3.9 MMOL/L (ref 3.5–5.3)
PROT SERPL-MCNC: 7.2 G/DL (ref 6.4–8.4)
PTH-INTACT SERPL-MCNC: 66.2 PG/ML (ref 12–88)
RBC # BLD AUTO: 4.58 MILLION/UL (ref 3.81–5.12)
SODIUM SERPL-SCNC: 140 MMOL/L (ref 135–147)
T4 FREE SERPL-MCNC: 0.92 NG/DL (ref 0.61–1.12)
TIBC SERPL-MCNC: 376 UG/DL (ref 250–450)
TRIGL SERPL-MCNC: 51 MG/DL
TSH SERPL DL<=0.05 MIU/L-ACNC: 1.97 UIU/ML (ref 0.45–4.5)
VIT B12 SERPL-MCNC: 181 PG/ML (ref 180–914)
WBC # BLD AUTO: 6.02 THOUSAND/UL (ref 4.31–10.16)

## 2023-08-02 PROCEDURE — 84630 ASSAY OF ZINC: CPT

## 2023-08-02 PROCEDURE — 84590 ASSAY OF VITAMIN A: CPT

## 2023-08-02 PROCEDURE — 85027 COMPLETE CBC AUTOMATED: CPT

## 2023-08-02 PROCEDURE — 80061 LIPID PANEL: CPT

## 2023-08-02 PROCEDURE — 36415 COLL VENOUS BLD VENIPUNCTURE: CPT

## 2023-08-02 PROCEDURE — 80053 COMPREHEN METABOLIC PANEL: CPT

## 2023-08-02 PROCEDURE — 83970 ASSAY OF PARATHORMONE: CPT

## 2023-08-02 PROCEDURE — 84425 ASSAY OF VITAMIN B-1: CPT

## 2023-08-02 PROCEDURE — 84439 ASSAY OF FREE THYROXINE: CPT

## 2023-08-02 PROCEDURE — 82607 VITAMIN B-12: CPT

## 2023-08-02 PROCEDURE — 99214 OFFICE O/P EST MOD 30 MIN: CPT | Performed by: FAMILY MEDICINE

## 2023-08-02 PROCEDURE — 82306 VITAMIN D 25 HYDROXY: CPT

## 2023-08-02 PROCEDURE — 84443 ASSAY THYROID STIM HORMONE: CPT

## 2023-08-02 PROCEDURE — 83540 ASSAY OF IRON: CPT

## 2023-08-02 PROCEDURE — 82728 ASSAY OF FERRITIN: CPT

## 2023-08-02 PROCEDURE — 83550 IRON BINDING TEST: CPT

## 2023-08-02 RX ORDER — AMOXICILLIN AND CLAVULANATE POTASSIUM 875; 125 MG/1; MG/1
1 TABLET, FILM COATED ORAL EVERY 12 HOURS SCHEDULED
Qty: 14 TABLET | Refills: 0 | Status: SHIPPED | OUTPATIENT
Start: 2023-08-02 | End: 2023-08-09

## 2023-08-02 NOTE — PROGRESS NOTES
Assessment/Plan:       1. Attention deficit hyperactivity disorder (ADHD), other type    2. Long-term current use of stimulant    3. Acute frontal sinusitis, recurrence not specified  -     amoxicillin-clavulanate (AUGMENTIN) 875-125 mg per tablet; Take 1 tablet by mouth every 12 (twelve) hours for 7 days    4. Acquired hypothyroidism    5. History of bariatric surgery                    Subjective:      Patient ID: Abby Hobbs is a 24 y.o. female. Review of Systems        Objective:  /78 (BP Location: Right arm, Patient Position: Sitting, Cuff Size: Standard)   Pulse 90   Temp 98.9 °F (37.2 °C) (Tympanic)   Ht 5' 7" (1.702 m)   Wt 81.4 kg (179 lb 6.4 oz)   SpO2 99%   BMI 28.10 kg/m²          Physical Exam          Transcribed for Erin Andino DO, by  on 08/02/23 at 5:41 PM. Powered by [employer].

## 2023-08-02 NOTE — PROGRESS NOTES
Name: Brock Mckenna      : 2000      MRN: [id number]  Encounter Provider: Stacey Sutton DO  Encounter Date: 2023   Encounter department: [address] [place] [address]     1. Attention deficit hyperactivity disorder (ADHD), other type    2. Long-term current use of stimulant    3. Acute frontal sinusitis, recurrence not specified  -     amoxicillin-clavulanate (AUGMENTIN) 875-125 mg per tablet; Take 1 tablet by mouth every 12 (twelve) hours for 7 days    4. Acquired hypothyroidism    5. History of bariatric surgery        ADHD  Long term current use of stimulant Adderall. We completed renewal of controlled substance agreement for Adderall today. Acute sinusitis. Prescribed Augmentin at this time. I recommend the patient to contact us if she encounters any problems with the new antibiotic. Advised to eat yogurt or some kind of probiotic daily while on the antibiotic, just not within 2 hours of the antibiotic dose. Additionally, educated that she can use a saltwater nose spray, like a saline nasal spray. Acquired hypothyroidism  Educated that her laboratories are still in process. We will hold off on Synthroid 75 mcg refill until labs can be reviewed. Annual physical with follow-up appointment in approx 6 months. Depression Screening and Follow-up Plan: Patient was screened for depression during today's encounter. They screened negative with a PHQ-2 score of 0. Subjective      Chief Complaint   Patient presents with   • Follow-up     Follow up    • Nasal Congestion     Runny nose, feels like nose is burning, started 2 days ago     The patient is a 19-year-old female who presents today for follow up visit. Scheduled f/u of her ADHD treated with Adderall/controlled substance; and pt has new c/o today nasal congestion and runny nose x 2 days.  During the initial assessment by the medical assistant, the patient's blood pressure and heart rate were high - both normal on recheck    The patient is here for a follow-up of her chronic ADHD treated with adderall. She also has a new problem with nasal congestion and runny nose that began approximately 2 days ago. Associated symptoms include a headache with a burning sensation localized in the center of the forehead, extending down into the nose. She denies headache behind the eyes or impacting the eyes. The patient denies any tooth or cheek pain and denies fever. To manage the symptoms, she has been using Tylenol. The patient denies having sweats or chills and confirms no recent contact with anyone known to be ill. Additionally, the patient has a cough with the production of a small amount of mucus, unsure if it is yellowish in color, but she mentions that yellowish mucus is being brought up from her nose. She briefly reports that her throat was scratchy on Thursday, 07/27/2023, but the sensation has since resolved. There is no tenderness on her forehead. The patient denies any shortness of breath and feels stable on Adderall, which is working well for her ADHD. She does not require a refill at this time. It appears that the patient completed the controlled substance agreement renewal with Elisabet Soriano in May, 2022, she is now slightly overdue for the renewal process. She denies antibiotic allergies and is not taking any birth control pills. She has discontinued Protonix, which was previously prescribed for acid reflux after her gastric sleeve surgery. She was on Protonix for about 8 months. Review of Systems  The pertinent positive and negative findings are as noted in the HPI.     Current Outpatient Medications on File Prior to Visit   Medication Sig   • amphetamine-dextroamphetamine (ADDERALL XR) 30 MG 24 hr capsule Take 1 capsule (30 mg total) by mouth every morning Max Daily Amount: 30 mg   • levothyroxine 75 mcg tablet take 1 tablet by mouth once daily   • Multiple Vitamins-Minerals (Multivitamin Adult) CHEW Chew   • Multiple Vitamin (multivitamin) capsule Take 1 capsule by mouth daily (Patient not taking: Reported on 8/2/2023)   • [DISCONTINUED] pantoprazole (PROTONIX) 20 mg tablet Take 2 tablets (40 mg total) by mouth daily (Patient not taking: Reported on 8/2/2023)       Objective     /78 (BP Location: Right arm, Patient Position: Sitting, Cuff Size: Standard)   Pulse 90   Temp 98.9 °F (37.2 °C) (Tympanic)   Ht 5' 7" (1.702 m)   Wt 81.4 kg (179 lb 6.4 oz)   SpO2 99%   BMI 28.10 kg/m²     Physical Exam  Vitals and nursing note reviewed. Constitutional:       General: She is awake. She is not in acute distress. Appearance: She is well-groomed. She is not ill-appearing, toxic-appearing or diaphoretic. HENT:      Head: Normocephalic and atraumatic. Right Ear: Tympanic membrane, ear canal and external ear normal.      Left Ear: Tympanic membrane, ear canal and external ear normal.      Nose:      Comments: Right nasal passage >>left is plugged with mucosal swelling at nasopharynx. No sinus tenderness. Tonsils appear normal.       Mouth/Throat:      Lips: Pink. Mouth: Mucous membranes are moist.      Pharynx: No posterior oropharyngeal erythema. Tonsils: No tonsillar exudate. 0 on the right. 0 on the left. Eyes:      General: Lids are normal.      Conjunctiva/sclera: Conjunctivae normal.   Neck:      Thyroid: No thyroid mass, thyromegaly or thyroid tenderness. Cardiovascular:      Rate and Rhythm: Normal rate and regular rhythm. Heart sounds: Normal heart sounds. No murmur heard. Pulmonary:      Effort: Pulmonary effort is normal.      Breath sounds: Normal breath sounds and air entry. No wheezing, rhonchi or rales. Musculoskeletal:      Cervical back: Neck supple. Right lower leg: No edema. Left lower leg: No edema. Lymphadenopathy:      Cervical: No cervical adenopathy. Skin:     General: Skin is warm and dry. Coloration: Skin is not pale. Neurological:      Mental Status: She is alert and oriented to person, place, and time. Gait: Gait normal.   Psychiatric:         Mood and Affect: Mood normal.         Behavior: Behavior normal. Behavior is cooperative.          Haile Khalil DO

## 2023-08-03 ENCOUNTER — TELEPHONE (OUTPATIENT)
Dept: BARIATRICS | Facility: CLINIC | Age: 23
End: 2023-08-03

## 2023-08-03 DIAGNOSIS — R79.0 LOW FERRITIN: ICD-10-CM

## 2023-08-03 DIAGNOSIS — E53.8 VITAMIN B12 DEFICIENCY: ICD-10-CM

## 2023-08-03 DIAGNOSIS — E03.9 ACQUIRED HYPOTHYROIDISM: ICD-10-CM

## 2023-08-03 DIAGNOSIS — E55.9 VITAMIN D INSUFFICIENCY: ICD-10-CM

## 2023-08-03 DIAGNOSIS — K91.2 POSTSURGICAL MALABSORPTION: Primary | ICD-10-CM

## 2023-08-03 RX ORDER — ERGOCALCIFEROL 1.25 MG/1
50000 CAPSULE ORAL
Qty: 8 CAPSULE | Refills: 0 | Status: SHIPPED | OUTPATIENT
Start: 2023-08-03

## 2023-08-03 RX ORDER — LEVOTHYROXINE SODIUM 0.07 MG/1
75 TABLET ORAL DAILY
Qty: 90 TABLET | Refills: 1 | Status: SHIPPED | OUTPATIENT
Start: 2023-08-03

## 2023-08-03 RX ORDER — CYANOCOBALAMIN 1000 UG/ML
1000 INJECTION, SOLUTION INTRAMUSCULAR; SUBCUTANEOUS WEEKLY
Status: SHIPPED | OUTPATIENT
Start: 2023-08-03 | End: 2023-08-31

## 2023-08-03 NOTE — TELEPHONE ENCOUNTER
Left message on voice mail. Informed patient was sending [facility] post lab review  recommendations via [address] [place] [address]. Requested patient contact office if any questions.
same name as above

## 2023-08-03 NOTE — RESULT ENCOUNTER NOTE
Please advise patient of labs:    -Vitamin D deficiency. Please start taking the vitamin D deficiency prescription that I am sending for 50,000IU 2x/week with FOOD x 4 weeks - take with food for best absorption. We will repeat vitamin D lab in about 4 months.    -Ferritin mildly low - ensure taking Charly MVI with 45mg iron and start OTC Blood Builder iron or Vitron C iron every other day if tolerated. Repeat iron panel in 4-6 months    - Vitamin B12 very low - Take an additional 2,000mcg sublingual B12 daily x 3 months. This can be found inexpensively OTC. I also recommend 1,000mcg IM B12 shots in the office; once weekly x 4 doses.

## 2023-08-04 ENCOUNTER — TELEPHONE (OUTPATIENT)
Dept: FAMILY MEDICINE CLINIC | Facility: CLINIC | Age: 23
End: 2023-08-04

## 2023-08-04 NOTE — TELEPHONE ENCOUNTER
Pt called stating that she was out of work since her appointment on 08-02-23. Pt is asking if she can get a note for the 3 days she was out. 08-02-23 to 08-04-23.  Please advise

## 2023-08-05 LAB — VIT B1 BLD-SCNC: 94.3 NMOL/L (ref 66.5–200)

## 2023-08-07 ENCOUNTER — DOCUMENTATION (OUTPATIENT)
Dept: FAMILY MEDICINE CLINIC | Facility: CLINIC | Age: 23
End: 2023-08-07

## 2023-08-07 LAB
VIT A SERPL-MCNC: 22.7 UG/DL (ref 18.9–57.3)
ZINC SERPL-MCNC: 70 UG/DL (ref 44–115)

## 2023-08-18 ENCOUNTER — TELEPHONE (OUTPATIENT)
Dept: BARIATRICS | Facility: CLINIC | Age: 23
End: 2023-08-18

## 2023-08-18 NOTE — TELEPHONE ENCOUNTER
Per patient requested, medication ordered by provider Randi Aden, was called to [facility]. Message left on her voicemail.

## 2023-08-23 DIAGNOSIS — E03.9 ACQUIRED HYPOTHYROIDISM: ICD-10-CM

## 2023-08-23 DIAGNOSIS — F90.0 ATTENTION DEFICIT HYPERACTIVITY DISORDER (ADHD), PREDOMINANTLY INATTENTIVE TYPE: ICD-10-CM

## 2023-08-23 NOTE — TELEPHONE ENCOUNTER
Medication:Adderall  PDMP Not Done No Authorization  Active agreement on file -Yes      Levothyroxine was called into another pharmacy on 08-

## 2023-08-25 RX ORDER — DEXTROAMPHETAMINE SACCHARATE, AMPHETAMINE ASPARTATE MONOHYDRATE, DEXTROAMPHETAMINE SULFATE AND AMPHETAMINE SULFATE 7.5; 7.5; 7.5; 7.5 MG/1; MG/1; MG/1; MG/1
30 CAPSULE, EXTENDED RELEASE ORAL EVERY MORNING
Qty: 30 CAPSULE | Refills: 0 | Status: SHIPPED | OUTPATIENT
Start: 2023-08-25

## 2023-08-25 RX ORDER — LEVOTHYROXINE SODIUM 0.07 MG/1
75 TABLET ORAL DAILY
Qty: 90 TABLET | Refills: 0 | Status: SHIPPED | OUTPATIENT
Start: 2023-08-25

## 2023-08-28 ENCOUNTER — TELEPHONE (OUTPATIENT)
Dept: FAMILY MEDICINE CLINIC | Facility: CLINIC | Age: 23
End: 2023-08-28

## 2023-08-28 NOTE — TELEPHONE ENCOUNTER
Pt called stating that her medication (levothyroxine) was refilled at the wrong pharmacy on 8/10/23. Pt is now unable to get her medication at [facility] because they are saying it was already filled. Pt no longer uses [facility] because they don't take her insurance. Pt needs the medication script resent.

## 2023-09-05 ENCOUNTER — OFFICE VISIT (OUTPATIENT)
Dept: URGENT CARE | Facility: CLINIC | Age: 23
End: 2023-09-05
Payer: COMMERCIAL

## 2023-09-05 ENCOUNTER — APPOINTMENT (OUTPATIENT)
Dept: RADIOLOGY | Facility: CLINIC | Age: 23
End: 2023-09-05
Payer: COMMERCIAL

## 2023-09-05 VITALS
HEART RATE: 88 BPM | RESPIRATION RATE: 18 BRPM | DIASTOLIC BLOOD PRESSURE: 66 MMHG | SYSTOLIC BLOOD PRESSURE: 132 MMHG | HEIGHT: 67 IN | OXYGEN SATURATION: 100 % | WEIGHT: 177 LBS | TEMPERATURE: 98 F | BODY MASS INDEX: 27.78 KG/M2

## 2023-09-05 DIAGNOSIS — S93.402A SPRAIN OF LEFT ANKLE, UNSPECIFIED LIGAMENT, INITIAL ENCOUNTER: Primary | ICD-10-CM

## 2023-09-05 DIAGNOSIS — S93.401A SPRAIN OF RIGHT ANKLE, UNSPECIFIED LIGAMENT, INITIAL ENCOUNTER: ICD-10-CM

## 2023-09-05 DIAGNOSIS — M25.571 ACUTE RIGHT ANKLE PAIN: ICD-10-CM

## 2023-09-05 DIAGNOSIS — M25.572 ACUTE LEFT ANKLE PAIN: ICD-10-CM

## 2023-09-05 PROCEDURE — 73610 X-RAY EXAM OF ANKLE: CPT

## 2023-09-05 PROCEDURE — 99213 OFFICE O/P EST LOW 20 MIN: CPT

## 2023-09-05 RX ORDER — CYANOCOBALAMIN 1000 UG/ML
INJECTION, SOLUTION INTRAMUSCULAR; SUBCUTANEOUS
COMMUNITY
Start: 2023-08-18

## 2023-09-05 RX ORDER — SYRINGE WITH NEEDLE, 1 ML 25GX5/8"
SYRINGE, EMPTY DISPOSABLE MISCELLANEOUS
COMMUNITY
Start: 2023-08-18

## 2023-09-05 NOTE — LETTER
September 5, 2023     Patient: Yessica Sparks   YOB: 2000   Date of Visit: 9/5/2023       To Whom it May Concern:    Yessica Sparks was seen in my clinic on 9/5/2023. She may return to work on 9/6/2023 . If you have any questions or concerns, please don't hesitate to call.          Sincerely,          Jasmin Vann PA-C        CC: No Recipients

## 2023-09-05 NOTE — PROGRESS NOTES
[facility]        NAME: Luis M Oh is a 24 y.o. female  : 2000    MRN: [id number]  DATE: 2023  TIME: 3:52 PM    Assessment and Plan   Sprain of left ankle, unspecified ligament, initial encounter [S93.402A]  1. Sprain of left ankle, unspecified ligament, initial encounter  XR ankle 3+ vw left      2. Acute left ankle pain  XR ankle 3+ vw left        Left ankle x-ray reviewed: No acute abnormalities noted. Patient Instructions     Wear Ace wrap as discussed  Tylenol/ibuprofen as needed  Ice 20 minutes 3-4 times per day  Insulate the skin from the ice to prevent frostbite  Rest and Elevate  Follow up with orthopedic if symptoms do not improve  Follow up with PCP in 3-5 days. Proceed to  ER if symptoms worsen. Chief Complaint     Chief Complaint   Patient presents with   • Ankle Injury     Left ankle sprained while playing volleyball           History of Present Illness       Patient is a 25 yo female with no significant PMH presenting in the clinic today for left ankle pain x 2 days. Patient notes she was playing volleyball 2 days ago when she tripped over a tree root causing excessive inversion of the left ankle. Patient locates their pain to the lateral aspect of the left ankle. Admits swelling and bruising along the lateral aspect of the left ankle. Admits pain is exacerbated with plantarflexion. Denies fever, chills, numbness, tingling, erythema, warmth, chest pain, and SOB. Admits the use of tylenol and ice therapy for symptom management. Denies prior similar injuries. Review of Systems   Review of Systems   Constitutional: Negative for chills and fever. Respiratory: Negative for shortness of breath. Cardiovascular: Negative for chest pain. Musculoskeletal: Positive for arthralgias and joint swelling. Skin: Negative for rash and wound. Neurological: Negative for numbness.          Current Medications       Current Outpatient Medications:   • amphetamine-dextroamphetamine (ADDERALL XR) 30 MG 24 hr capsule, Take 1 capsule (30 mg total) by mouth every morning Max Daily Amount: 30 mg, Disp: 30 capsule, Rfl: 0  •  ergocalciferol (VITAMIN D2) 50,000 units, Take 1 capsule (50,000 Units total) by mouth 2 (two) times a week with meals, Disp: 8 capsule, Rfl: 0  •  levothyroxine 75 mcg tablet, Take 1 tablet (75 mcg total) by mouth daily, Disp: 90 tablet, Rfl: 0  •  Multiple Vitamin (multivitamin) capsule, Take 1 capsule by mouth daily, Disp: , Rfl:   •  Multiple Vitamins-Minerals (Multivitamin Adult) CHEW, Chew, Disp: , Rfl:   •  B-D 3CC LUER-BARBI SYR 25GX1/2" 25G X 1-1/2" 3 ML MISC, use with VITAMIN B-12 SHOTS, Disp: , Rfl:   •  cyanocobalamin 1,000 mcg/mL, inject 1 milliliter ( 1000 MCG ) intramuscularly every week for 4 weeks, Disp: , Rfl:     Current Allergies     Allergies as of 09/05/2023   • (No Known Allergies)            The following portions of the patient's history were reviewed and updated as appropriate: allergies, current medications, past family history, past medical history, past social history, past surgical history and problem list.     Past Medical History:   Diagnosis Date   • ADHD (attention deficit hyperactivity disorder)    • Hypothyroid    • Morbid obesity with BMI of 45.0-49.9, adult ([address] )        Past Surgical History:   Procedure Laterality Date   • EGD     • FRACTURE SURGERY     • WI LAPS GSTRC RSTRICTIV PX LONGITUDINAL GASTRECTOMY N/A 09/12/2022    Procedure: Lydia Liu EGD;  Surgeon: Andrey Alegre MD;  Location: [facility];  Service: Bariatrics   • WISDOM TOOTH EXTRACTION     • WRIST SURGERY Right     secondary to a fracture       Family History   Problem Relation Age of Onset   • Heart disease Paternal Grandfather    • Diabetes Paternal Grandfather    • Lung cancer Paternal Grandfather    • Diabetes Sister         prediabetes   • Thyroid disease Paternal Grandmother    • ADD / ADHD Brother • Stroke Neg Hx          Medications have been verified. Objective   /66   Pulse 88   Temp 98 °F (36.7 °C)   Resp 18   Ht 5' 7" (1.702 m)   Wt 80.3 kg (177 lb)   SpO2 100%   BMI 27.72 kg/m²        Physical Exam     Physical Exam  Vitals reviewed. Constitutional:       General: She is not in acute distress. Appearance: Normal appearance. She is normal weight. She is not ill-appearing. HENT:      Head: Normocephalic. Nose: Nose normal.      Mouth/Throat:      Mouth: Mucous membranes are moist.   Eyes:      Conjunctiva/sclera: Conjunctivae normal.   Cardiovascular:      Rate and Rhythm: Normal rate and regular rhythm. Pulses: Normal pulses. Heart sounds: Normal heart sounds. No murmur heard. No friction rub. No gallop. Pulmonary:      Effort: Pulmonary effort is normal.      Breath sounds: Normal breath sounds. No wheezing, rhonchi or rales. Musculoskeletal:      Cervical back: Normal range of motion and neck supple. Right lower leg: Normal.      Left lower leg: Normal.      Right ankle: Normal.      Left ankle: Swelling (lateral aspect) present. Tenderness present over the lateral malleolus. No medial malleolus, base of 5th metatarsal or proximal fibula tenderness. Decreased range of motion (Plantarflexion secondary to pain). Anterior drawer test negative. Normal pulse. Right foot: Normal.      Left foot: Normal.   Skin:     General: Skin is warm. Findings: No rash. Neurological:      Mental Status: She is alert.    Psychiatric:         Mood and Affect: Mood normal.         Behavior: Behavior normal.

## 2023-09-05 NOTE — PATIENT INSTRUCTIONS
Wear Ace wrap as discussed  Tylenol/ibuprofen as needed  Ice 20 minutes 3-4 times per day  Insulate the skin from the ice to prevent frostbite  Rest and Elevate  Follow up with orthopedic if symptoms do not improve  Follow up with PCP in 3-5 days. Proceed to ER if symptoms worsen.

## 2023-10-03 DIAGNOSIS — F90.0 ATTENTION DEFICIT HYPERACTIVITY DISORDER (ADHD), PREDOMINANTLY INATTENTIVE TYPE: ICD-10-CM

## 2023-10-05 RX ORDER — DEXTROAMPHETAMINE SACCHARATE, AMPHETAMINE ASPARTATE MONOHYDRATE, DEXTROAMPHETAMINE SULFATE AND AMPHETAMINE SULFATE 7.5; 7.5; 7.5; 7.5 MG/1; MG/1; MG/1; MG/1
30 CAPSULE, EXTENDED RELEASE ORAL EVERY MORNING
Qty: 30 CAPSULE | Refills: 0 | Status: SHIPPED | OUTPATIENT
Start: 2023-10-05

## 2023-10-27 ENCOUNTER — HOSPITAL ENCOUNTER (EMERGENCY)
Facility: HOSPITAL | Age: 23
Discharge: HOME | End: 2023-10-27
Attending: EMERGENCY MEDICINE
Payer: COMMERCIAL

## 2023-10-27 VITALS
SYSTOLIC BLOOD PRESSURE: 141 MMHG | OXYGEN SATURATION: 100 % | RESPIRATION RATE: 15 BRPM | BODY MASS INDEX: 27.72 KG/M2 | TEMPERATURE: 98 F | DIASTOLIC BLOOD PRESSURE: 78 MMHG | HEART RATE: 83 BPM | HEIGHT: 67 IN

## 2023-10-27 DIAGNOSIS — H53.8 BLURRED VISION: Primary | ICD-10-CM

## 2023-10-27 DIAGNOSIS — F41.9 ANXIETY: ICD-10-CM

## 2023-10-27 DIAGNOSIS — R55 NEAR SYNCOPE: ICD-10-CM

## 2023-10-27 LAB
ALBUMIN SERPL BCP-MCNC: 5 G/DL (ref 3.5–5)
ALP SERPL-CCNC: 64 U/L (ref 34–104)
ALT SERPL W P-5'-P-CCNC: 11 U/L (ref 7–52)
ANION GAP SERPL CALCULATED.3IONS-SCNC: 10 MMOL/L
AST SERPL W P-5'-P-CCNC: 16 U/L (ref 13–39)
ATRIAL RATE: 103 BPM
B-HCG SERPL-ACNC: <1 MIU/ML (ref 0–5)
BASOPHILS # BLD AUTO: 0.03 THOUSANDS/ÂΜL (ref 0–0.1)
BASOPHILS NFR BLD AUTO: 1 % (ref 0–1)
BILIRUB SERPL-MCNC: 0.48 MG/DL (ref 0.2–1)
BUN SERPL-MCNC: 8 MG/DL (ref 5–25)
CALCIUM SERPL-MCNC: 9.8 MG/DL (ref 8.4–10.2)
CHLORIDE SERPL-SCNC: 100 MMOL/L (ref 96–108)
CO2 SERPL-SCNC: 26 MMOL/L (ref 21–32)
CREAT SERPL-MCNC: 0.81 MG/DL (ref 0.6–1.3)
EOSINOPHIL # BLD AUTO: 0.03 THOUSAND/ÂΜL (ref 0–0.61)
EOSINOPHIL NFR BLD AUTO: 1 % (ref 0–6)
ERYTHROCYTE [DISTWIDTH] IN BLOOD BY AUTOMATED COUNT: 11.5 % (ref 11.6–15.1)
GFR SERPL CREATININE-BSD FRML MDRD: 101 ML/MIN/1.73SQ M
GLUCOSE SERPL-MCNC: 91 MG/DL (ref 65–140)
HCT VFR BLD AUTO: 44.1 % (ref 34.8–46.1)
HGB BLD-MCNC: 14.4 G/DL (ref 11.5–15.4)
IMM GRANULOCYTES # BLD AUTO: 0.01 THOUSAND/UL (ref 0–0.2)
IMM GRANULOCYTES NFR BLD AUTO: 0 % (ref 0–2)
LYMPHOCYTES # BLD AUTO: 1.4 THOUSANDS/ÂΜL (ref 0.6–4.47)
LYMPHOCYTES NFR BLD AUTO: 24 % (ref 14–44)
MAGNESIUM SERPL-MCNC: 2 MG/DL (ref 1.9–2.7)
MCH RBC QN AUTO: 31.2 PG (ref 26.8–34.3)
MCHC RBC AUTO-ENTMCNC: 32.7 G/DL (ref 31.4–37.4)
MCV RBC AUTO: 96 FL (ref 82–98)
MONOCYTES # BLD AUTO: 0.35 THOUSAND/ÂΜL (ref 0.17–1.22)
MONOCYTES NFR BLD AUTO: 6 % (ref 4–12)
NEUTROPHILS # BLD AUTO: 3.91 THOUSANDS/ÂΜL (ref 1.85–7.62)
NEUTS SEG NFR BLD AUTO: 68 % (ref 43–75)
NRBC BLD AUTO-RTO: 0 /100 WBCS
P AXIS: 68 DEGREES
PHOSPHATE SERPL-MCNC: 3.5 MG/DL (ref 2.7–4.5)
PLATELET # BLD AUTO: 268 THOUSANDS/UL (ref 149–390)
PMV BLD AUTO: 9.2 FL (ref 8.9–12.7)
POTASSIUM SERPL-SCNC: 3.4 MMOL/L (ref 3.5–5.3)
PR INTERVAL: 142 MS
PROT SERPL-MCNC: 7.6 G/DL (ref 6.4–8.4)
QRS AXIS: 87 DEGREES
QRSD INTERVAL: 88 MS
QT INTERVAL: 346 MS
QTC INTERVAL: 453 MS
RBC # BLD AUTO: 4.61 MILLION/UL (ref 3.81–5.12)
SODIUM SERPL-SCNC: 136 MMOL/L (ref 135–147)
T WAVE AXIS: 67 DEGREES
TSH SERPL DL<=0.05 MIU/L-ACNC: 4.37 UIU/ML (ref 0.45–4.5)
VENTRICULAR RATE: 103 BPM
WBC # BLD AUTO: 5.73 THOUSAND/UL (ref 4.31–10.16)

## 2023-10-27 PROCEDURE — 83735 ASSAY OF MAGNESIUM: CPT

## 2023-10-27 PROCEDURE — 85025 COMPLETE CBC W/AUTO DIFF WBC: CPT

## 2023-10-27 PROCEDURE — 84100 ASSAY OF PHOSPHORUS: CPT

## 2023-10-27 PROCEDURE — 99284 EMERGENCY DEPT VISIT MOD MDM: CPT | Performed by: EMERGENCY MEDICINE

## 2023-10-27 PROCEDURE — 80053 COMPREHEN METABOLIC PANEL: CPT

## 2023-10-27 PROCEDURE — 93005 ELECTROCARDIOGRAM TRACING: CPT

## 2023-10-27 PROCEDURE — 93010 ELECTROCARDIOGRAM REPORT: CPT | Performed by: INTERNAL MEDICINE

## 2023-10-27 PROCEDURE — 99284 EMERGENCY DEPT VISIT MOD MDM: CPT

## 2023-10-27 PROCEDURE — 84702 CHORIONIC GONADOTROPIN TEST: CPT

## 2023-10-27 PROCEDURE — 96365 THER/PROPH/DIAG IV INF INIT: CPT

## 2023-10-27 PROCEDURE — 84443 ASSAY THYROID STIM HORMONE: CPT

## 2023-10-27 PROCEDURE — 36415 COLL VENOUS BLD VENIPUNCTURE: CPT

## 2023-10-27 RX ORDER — HYDROXYZINE HYDROCHLORIDE 25 MG/1
25 TABLET, FILM COATED ORAL ONCE
Status: COMPLETED | OUTPATIENT
Start: 2023-10-27 | End: 2023-10-27

## 2023-10-27 RX ORDER — HYDROXYZINE HYDROCHLORIDE 25 MG/1
25 TABLET, FILM COATED ORAL EVERY 6 HOURS PRN
Qty: 25 TABLET | Refills: 0 | Status: SHIPPED | OUTPATIENT
Start: 2023-10-27

## 2023-10-27 RX ORDER — HYDROXYZINE HYDROCHLORIDE 25 MG/1
25 TABLET, FILM COATED ORAL EVERY 6 HOURS PRN
Qty: 25 TABLET | Refills: 0 | Status: SHIPPED | OUTPATIENT
Start: 2023-10-27 | End: 2023-10-27 | Stop reason: SDUPTHER

## 2023-10-27 RX ADMIN — SODIUM CHLORIDE, SODIUM LACTATE, POTASSIUM CHLORIDE, AND CALCIUM CHLORIDE 1000 ML: .6; .31; .03; .02 INJECTION, SOLUTION INTRAVENOUS at 13:05

## 2023-10-27 RX ADMIN — HYDROXYZINE HYDROCHLORIDE 25 MG: 25 TABLET ORAL at 13:05

## 2023-10-27 NOTE — ED PROVIDER NOTES
History  Chief Complaint   Patient presents with    Blurred Vision     Started 1 hr ago and still there / blurred / seeing bright light  On Tuesday patient was in the shower and passed out / blacked out. No fall/ injury      26-year-old woman with relevant PMH ADHD, hypothyroidism, and gastric sleeve surgery presents with near syncope and blurry vision. She reports on Tuesday being in the shower having an episode of nausea, narrowing of her vision, and near syncope. She stepped out and caught herself on the sink. She does not recall actually passing out. She did not wake up on the floor and was not confused afterwards. She denies any precipitating chest pain, dyspnea, palpitations, or headache. This episode resolved shortly. Today at work, she had an episode of bilateral blurry vision. She does not wear contacts or glasses. There were no other associated symptoms during the vision change including nausea, slurred speech, or muscle weakness. This has since resolved. Neither of the near syncope or blurry vision episodes have happened in the past.  Patient drinks alcohol occasionally but does not use drugs or tobacco.  She does endorse a history of anxiety. Last menstrual period last week and normal.  She is reporting diarrhea but was unable to quantify how often or the quality when I asked that she claims she does not recall. At this time she is feeling well aside from being anxious. Prior to Admission Medications   Prescriptions Last Dose Informant Patient Reported? Taking?    B-D 3CC LUER-BARBI SYR 25GX1/2" 25G X 1-1/2" 3 ML MISC   Yes No   Sig: use with VITAMIN B-12 SHOTS   Multiple Vitamin (multivitamin) capsule   Yes No   Sig: Take 1 capsule by mouth daily   Multiple Vitamins-Minerals (Multivitamin Adult) CHEW   Yes No   Sig: Chew   amphetamine-dextroamphetamine (ADDERALL XR) 30 MG 24 hr capsule   No No   Sig: Take 1 capsule (30 mg total) by mouth every morning Max Daily Amount: 30 mg cyanocobalamin 1,000 mcg/mL   Yes No   Sig: inject 1 milliliter ( 1000 MCG ) intramuscularly every week for 4 weeks   ergocalciferol (VITAMIN D2) 50,000 units   No No   Sig: Take 1 capsule (50,000 Units total) by mouth 2 (two) times a week with meals   levothyroxine 75 mcg tablet   No No   Sig: Take 1 tablet (75 mcg total) by mouth daily      Facility-Administered Medications: None       Past Medical History:   Diagnosis Date    ADHD (attention deficit hyperactivity disorder)     Hypothyroid     Morbid obesity with BMI of 45.0-49.9, adult (HCC)        Past Surgical History:   Procedure Laterality Date    EGD      FRACTURE SURGERY      NM LAPS 175 Juanita Diggs RSTRICTIV PX LONGITUDINAL GASTRECTOMY N/A 09/12/2022    Procedure: GASTRECTOMY SLEEVE LAPAROSCOPIC AND Nyasia Most EGD;  Surgeon: Jose Jaeger MD;  Location: [facility] OR;  Service: Bariatrics    WISDOM TOOTH EXTRACTION      WRIST SURGERY Right     secondary to a fracture       Family History   Problem Relation Age of Onset    Heart disease Paternal Grandfather     Diabetes Paternal Grandfather     Lung cancer Paternal Grandfather     Diabetes Sister         prediabetes    Thyroid disease Paternal Grandmother     ADD / ADHD Brother     Stroke Neg Hx      I have reviewed and agree with the history as documented.     E-Cigarette/Vaping    E-Cigarette Use Former User     Quit Date 12/1/22      E-Cigarette/Vaping Substances    Nicotine Yes     THC No     CBD No     Flavoring Yes      Social History     Tobacco Use    Smoking status: Former     Types: Cigarettes, E-Cigarettes    Smokeless tobacco: Former   Vaping Use    Vaping Use: Former    Quit date: 12/1/2022    Substances: Nicotine, Flavoring   Substance Use Topics    Alcohol use: Yes     Comment: sociably    Drug use: Never        Review of Systems    Physical Exam  ED Triage Vitals [10/27/23 1203]   Temperature Pulse Respirations Blood Pressure SpO2   98 °F (36.7 °C) 102 18 142/85 98 %      Temp Source Heart Rate Source Patient Position - Orthostatic VS BP Location FiO2 (%)   Temporal Monitor Sitting Left arm --      Pain Score       No Pain             Orthostatic Vital Signs  Vitals:    10/27/23 1203 10/27/23 1330 10/27/23 1341   BP: 142/85 130/73    Pulse: 102 95 93   Patient Position - Orthostatic VS: Sitting Sitting        Physical Exam  Vitals and nursing note reviewed. Constitutional:       General: She is not in acute distress. Appearance: Normal appearance. She is well-developed. HENT:      Head: Normocephalic and atraumatic. Right Ear: External ear normal.      Left Ear: External ear normal.      Nose: Nose normal. No congestion. Eyes:      Extraocular Movements: Extraocular movements intact. Pupils: Pupils are equal, round, and reactive to light. Cardiovascular:      Rate and Rhythm: Normal rate and regular rhythm. Pulmonary:      Effort: Pulmonary effort is normal. No respiratory distress. Breath sounds: Normal breath sounds. Abdominal:      Palpations: Abdomen is soft. Tenderness: There is no abdominal tenderness. There is no guarding or rebound. Musculoskeletal:         General: Normal range of motion. Cervical back: Normal range of motion and neck supple. Skin:     General: Skin is warm and dry. Neurological:      Mental Status: She is alert and oriented to person, place, and time. Mental status is at baseline. Sensory: No sensory deficit. Motor: No weakness. Psychiatric:         Mood and Affect: Mood is anxious.          Behavior: Behavior normal.         ED Medications  Medications   lactated ringers bolus 1,000 mL (1,000 mL Intravenous New Bag 10/27/23 1305)   hydrOXYzine HCL (ATARAX) tablet 25 mg (25 mg Oral Given 10/27/23 1305)       Diagnostic Studies  Results Reviewed       Procedure Component Value Units Date/Time    TSH, 3rd generation with Free T4 reflex [[id number]]  (Normal) Collected: 10/27/23 1241    Lab Status: Final result Specimen: Blood from Arm, Right Updated: 10/27/23 1326     TSH 3RD GENERATON 4.371 uIU/mL     hCG, quantitative [[id number]]  (Normal) Collected: 10/27/23 1241    Lab Status: Final result Specimen: Blood from Arm, Right Updated: 10/27/23 1326     HCG, Quant <1 mIU/mL     Narrative:       Expected Ranges:    HCG results between 5 and 25 mIU/mL may be indicative of early pregnancy but should be interpreted in light of the total clinical presentation. HCG can rise to detectable levels in antoinette and post menopausal women (0-11.6 mIU/mL).      Approximate               Approximate HCG  Gestation age          Concentration ( mIU/mL)  _____________          ______________________   Christopher Barrios                      HCG values  0.2-1                       5-50  1-2                           2-3                         100-5000  3-4                         500-12418  4-5                         1000-33251  5-6                         [id number]  6-8                         80106-327602  8-12                        [id number]      Comprehensive metabolic panel [[id number]]  (Abnormal) Collected: 10/27/23 1241    Lab Status: Final result Specimen: Blood from Arm, Right Updated: 10/27/23 1319     Sodium 136 mmol/L      Potassium 3.4 mmol/L      Chloride 100 mmol/L      CO2 26 mmol/L      ANION GAP 10 mmol/L      BUN 8 mg/dL      Creatinine 0.81 mg/dL      Glucose 91 mg/dL      Calcium 9.8 mg/dL      AST 16 U/L      ALT 11 U/L      Alkaline Phosphatase 64 U/L      Total Protein 7.6 g/dL      Albumin 5.0 g/dL      Total Bilirubin 0.48 mg/dL      eGFR 101 ml/min/1.73sq m     Narrative:      [place] guidelines for Chronic Kidney Disease (CKD):     Stage 1 with normal or high GFR (GFR > 90 mL/min/1.73 square meters)    Stage 2 Mild CKD (GFR = 60-89 mL/min/1.73 square meters)    Stage 3A Moderate CKD (GFR = 45-59 mL/min/1.73 square meters)    Stage 3B Moderate CKD (GFR = 30-44 mL/min/1.73 square meters)    Stage 4 Severe CKD (GFR = 15-29 mL/min/1.73 square meters)    Stage 5 End Stage CKD (GFR <15 mL/min/1.73 square meters)  Note: GFR calculation is accurate only with a steady state creatinine    Magnesium [[id number]]  (Normal) Collected: 10/27/23 1241    Lab Status: Final result Specimen: Blood from Arm, Right Updated: 10/27/23 1319     Magnesium 2.0 mg/dL     Phosphorus [[id number]]  (Normal) Collected: 10/27/23 1241    Lab Status: Final result Specimen: Blood from Arm, Right Updated: 10/27/23 1319     Phosphorus 3.5 mg/dL     CBC and differential [[id number]]  (Abnormal) Collected: 10/27/23 1241    Lab Status: Final result Specimen: Blood from Arm, Right Updated: 10/27/23 1253     WBC 5.73 Thousand/uL      RBC 4.61 Million/uL      Hemoglobin 14.4 g/dL      Hematocrit 44.1 %      MCV 96 fL      MCH 31.2 pg      MCHC 32.7 g/dL      RDW 11.5 %      MPV 9.2 fL      Platelets 520 Thousands/uL      nRBC 0 /100 WBCs      Neutrophils Relative 68 %      Immat GRANS % 0 %      Lymphocytes Relative 24 %      Monocytes Relative 6 %      Eosinophils Relative 1 %      Basophils Relative 1 %      Neutrophils Absolute 3.91 Thousands/µL      Immature Grans Absolute 0.01 Thousand/uL      Lymphocytes Absolute 1.40 Thousands/µL      Monocytes Absolute 0.35 Thousand/µL      Eosinophils Absolute 0.03 Thousand/µL      Basophils Absolute 0.03 Thousands/µL                    No orders to display         Procedures  Procedures      ED Course  ED Course as of 10/27/23 1428   Fri Oct 27, 2023   1332 This ECG was interpreted by me. The ECG demonstrates sinus tachycardia, normal intervals, normal axis, normal QRS, no acute ST changes present. [phone] Patient feeling much better. Will finish IV fluids and discharge. SBIRT 20yo+      Flowsheet Row Most Recent Value   Initial Alcohol Screen: US AUDIT-C     1. How often do you have a drink containing alcohol? 3 Filed at: 10/27/2023 1201   2.  How many drinks containing alcohol do you have on a typical day you are drinking? 2 Filed at: 10/27/2023 1201   3a. Male UNDER 65: How often do you have five or more drinks on one occasion? 0 Filed at: 10/27/2023 1201   3b. FEMALE Any Age, or MALE 65+: How often do you have 4 or more drinks on one occassion? 0 Filed at: 10/27/2023 1201   Audit-C Score 5 Filed at: 10/27/2023 1201   MARSHA: How many times in the past year have you. .. Used an illegal drug or used a prescription medication for non-medical reasons? Never Filed at: 10/27/2023 1201                  Medical Decision Making  Presents with near syncope 2 days ago and blurry vision today. She reports resolution of her symptoms. DDX: anxiety, dehydration, electrolyte abnormality, vasovagal syncope    Labs and ECG unremarkable. She remained asymptomatic while here. Patient does endorse significant anxiety. Counseled to seek PCP help for anxiety management. She was prescribed atarax PRN for panic attacks. Patient in agreement with plan and questions were answered. Verbalized understanding of return precautions. Portions or all of this note were generated using voice recognition software. Occasional wrong word or "sound a like" substitutions may have occurred due to the inherent limitations of voice recognition software. Please interpret any errors within the intended context of the whole sentence or idea. Amount and/or Complexity of Data Reviewed  Labs: ordered. Risk  Prescription drug management.           Disposition  Final diagnoses:   Blurred vision   Near syncope   Anxiety     Time reflects when diagnosis was documented in both MDM as applicable and the Disposition within this note       Time User Action Codes Description Comment    10/27/2023 12:54 PM Zaki Apo Add [H53.8] Blurred vision     10/27/2023 12:54 PM Zaki Apo Add [R55] Near syncope     10/27/2023 12:54 PM Zaki Apo Add [F41.9] Anxiety           ED Disposition       ED Disposition   Discharge Condition   Stable    Date/Time   Fri Oct 27, 2023 1254    Comment   Dee Dee Isaac discharge to home/self care. Follow-up Information       Follow up With Specialties Details [address], DO Family Medicine Schedule an appointment as soon as possible for a visit in 3 days  [address]              Current Discharge Medication List        START taking these medications    Details   hydrOXYzine HCL (ATARAX) 25 mg tablet Take 1 tablet (25 mg total) by mouth every 6 (six) hours as needed for anxiety  Qty: 25 tablet, Refills: 0    Associated Diagnoses: Anxiety           CONTINUE these medications which have NOT CHANGED    Details   amphetamine-dextroamphetamine (ADDERALL XR) 30 MG 24 hr capsule Take 1 capsule (30 mg total) by mouth every morning Max Daily Amount: 30 mg  Qty: 30 capsule, Refills: 0    Associated Diagnoses: Attention deficit hyperactivity disorder (ADHD), predominantly inattentive type      B-D 3CC LUER-BARBI SYR 25GX1/2" 25G X 1-1/2" 3 ML MISC use with VITAMIN B-12 SHOTS      cyanocobalamin 1,000 mcg/mL inject 1 milliliter ( 1000 MCG ) intramuscularly every week for 4 weeks      ergocalciferol (VITAMIN D2) 50,000 units Take 1 capsule (50,000 Units total) by mouth 2 (two) times a week with meals  Qty: 8 capsule, Refills: 0    Associated Diagnoses: Postsurgical malabsorption; Vitamin D insufficiency      levothyroxine 75 mcg tablet Take 1 tablet (75 mcg total) by mouth daily  Qty: 90 tablet, Refills: 0    Associated Diagnoses: Acquired hypothyroidism      Multiple Vitamin (multivitamin) capsule Take 1 capsule by mouth daily      Multiple Vitamins-Minerals (Multivitamin Adult) CHEW Chew           No discharge procedures on file. PDMP Review         Value Time User    PDMP Reviewed  Yes 10/5/2023 12:10 PM David Gomez DO             ED Provider  Attending physically available and evaluated Dee Dee Isaac.  I managed the patient along with the ED Attending.     Electronically Signed by           Cheikh Soliz MD  10/27/23 5688

## 2023-10-27 NOTE — Clinical Note
Nereida Joaquinandre was seen and treated in our emergency department on 10/27/2023. No restrictions            Diagnosis:     Anatoly Evans  may return to work on return date. She may return on this date: 10/28/2023         If you have any questions or concerns, please don't hesitate to call.       Josias Porter MD    ______________________________           _______________          _______________  [facility] Representative                              Date                                Time

## 2023-10-27 NOTE — DISCHARGE INSTRUCTIONS
You were seen for blurry vision and near syncope. We found no emergent causes for your symptoms. You can try the prescribed medicine when you severe anxiety attacks.

## 2023-11-13 DIAGNOSIS — F90.0 ATTENTION DEFICIT HYPERACTIVITY DISORDER (ADHD), PREDOMINANTLY INATTENTIVE TYPE: ICD-10-CM

## 2023-11-15 RX ORDER — DEXTROAMPHETAMINE SACCHARATE, AMPHETAMINE ASPARTATE MONOHYDRATE, DEXTROAMPHETAMINE SULFATE AND AMPHETAMINE SULFATE 7.5; 7.5; 7.5; 7.5 MG/1; MG/1; MG/1; MG/1
30 CAPSULE, EXTENDED RELEASE ORAL EVERY MORNING
Qty: 30 CAPSULE | Refills: 0 | Status: SHIPPED | OUTPATIENT
Start: 2023-11-15

## 2023-11-21 ENCOUNTER — OFFICE VISIT (OUTPATIENT)
Dept: FAMILY MEDICINE CLINIC | Facility: CLINIC | Age: 23
End: 2023-11-21
Payer: COMMERCIAL

## 2023-11-21 VITALS
HEIGHT: 67 IN | BODY MASS INDEX: 27.59 KG/M2 | DIASTOLIC BLOOD PRESSURE: 74 MMHG | SYSTOLIC BLOOD PRESSURE: 138 MMHG | WEIGHT: 175.8 LBS | HEART RATE: 108 BPM | OXYGEN SATURATION: 100 % | TEMPERATURE: 98.1 F

## 2023-11-21 DIAGNOSIS — F41.9 ANXIETY: Primary | ICD-10-CM

## 2023-11-21 DIAGNOSIS — R19.5 STOOL COLOR ABNORMAL: ICD-10-CM

## 2023-11-21 DIAGNOSIS — F90.8 ATTENTION DEFICIT HYPERACTIVITY DISORDER (ADHD), OTHER TYPE: ICD-10-CM

## 2023-11-21 DIAGNOSIS — Z98.84 HISTORY OF BARIATRIC SURGERY: ICD-10-CM

## 2023-11-21 PROCEDURE — 99214 OFFICE O/P EST MOD 30 MIN: CPT | Performed by: FAMILY MEDICINE

## 2023-11-21 RX ORDER — FLUCONAZOLE 150 MG/1
TABLET ORAL
COMMUNITY
Start: 2023-10-23 | End: 2023-11-21 | Stop reason: ALTCHOICE

## 2023-11-21 RX ORDER — BUSPIRONE HYDROCHLORIDE 5 MG/1
5 TABLET ORAL 2 TIMES DAILY
Qty: 60 TABLET | Refills: 0 | Status: SHIPPED | OUTPATIENT
Start: 2023-11-21

## 2023-11-21 NOTE — PROGRESS NOTES
Name: Luis M Oh      : 2000      MRN: [id number]  Encounter Provider: Juan Jurado DO  Encounter Date: 2023   Encounter department: [address] [place] [address]     1. Anxiety  -     Ambulatory referral to [employer]; Future  -     busPIRone (BUSPAR) 5 mg tablet; Take 1 tablet (5 mg total) by mouth 2 (two) times a day    2. History of bariatric surgery    3. Attention deficit hyperactivity disorder (ADHD), other type  -     Ambulatory referral to [employer]; Future    4.  Stool color abnormal  Comments:  pt reports had "black" stool episode last night- she is on iron supplement - will monitor and call if has another episode    Start Buspar, continue PRN hydroxyzine, and I advised pt to schedule eval/treat with psychiatry due to combined dx ADHD treated with stimulant + anxiety disorder       Subjective      Chief Complaint   Patient presents with    Follow-up     Follow up from ED visit       ER f/u - seen at ER 10/27 with blurry vision and near syncope c/o and etiology was determined to likely be anxiety - was given rx hydroxyzine  Pt checked in late for her appt today and subsequently was not ready in room to be seen until 5 minutes remained of her appt  I d/w pt difficulty treating anxiety when pt is on a stimulant (adderall) and that I'll place order for psychiatry eval and in meantime continue hydroxyzine  Pt admits the hydroxyzine has been helping - "only ever took 2 of them"  "Gets worse at night, and I feel like it's health-related - feel like I'm dying- get it from my grandmother- and I feel like it's worse now that it's winter"  Also reports had black BM last night- "don't know if it's the iron supplement I'm taking since gastric sleeve surgery" (2022)        10/27/2023 (2 hours)  [address] Emergency Department  History  Chief Complaint  Patient presents with  · Blurred Vision      Started 1 hr ago and still there / blurred / seeing bright light  On Tuesday patient was in the shower and passed out / blacked out. No fall/ injury   21-year-old woman with relevant PMH ADHD, hypothyroidism, and gastric sleeve surgery presents with near syncope and blurry vision. She reports on Tuesday being in the shower having an episode of nausea, narrowing of her vision, and near syncope. She stepped out and caught herself on the sink. She does not recall actually passing out. She did not wake up on the floor and was not confused afterwards. She denies any precipitating chest pain, dyspnea, palpitations, or headache. This episode resolved shortly. Today at work, she had an episode of bilateral blurry vision. She does not wear contacts or glasses. There were no other associated symptoms during the vision change including nausea, slurred speech, or muscle weakness. This has since resolved. Neither of the near syncope or blurry vision episodes have happened in the past.  Patient drinks alcohol occasionally but does not use drugs or tobacco.  She does endorse a history of anxiety. Last menstrual period last week and normal.  She is reporting diarrhea but was unable to quantify how often or the quality when I asked that she claims she does not recall. At this time she is feeling well aside from being anxious. ED Medications  Medications  lactated ringers bolus 1,000 mL (1,000 mL Intravenous New Bag 10/27/23 1305)  hydrOXYzine HCL (ATARAX) tablet 25 mg (25 mg Oral Given 10/27/23 1305)  ED Course  ED Course as of 10/27/23 1428  Fri Oct 27, 2023  1332 This ECG was interpreted by me. The ECG demonstrates sinus tachycardia, normal intervals, normal axis, normal QRS, no acute ST changes present. [phone] Patient feeling much better. Will finish IV fluids and discharge. Medical Decision Making  Presents with near syncope 2 days ago and blurry vision today. She reports resolution of her symptoms.   DDX: anxiety, dehydration, electrolyte abnormality, vasovagal syncope  Labs and ECG unremarkable. She remained asymptomatic while here. Patient does endorse significant anxiety. Counseled to seek PCP help for anxiety management. She was prescribed atarax PRN for panic attacks. Patient in agreement with plan and questions were answered. Verbalized understanding of return precautions. Clinical Impressions  Blurred vision  Near syncope  Anxiety            Review of Systems   Psychiatric/Behavioral:  Negative for agitation, behavioral problems, confusion, decreased concentration, dysphoric mood, hallucinations, self-injury, sleep disturbance and suicidal ideas. The patient is not hyperactive.         Current Outpatient Medications on File Prior to Visit   Medication Sig    amphetamine-dextroamphetamine (ADDERALL XR) 30 MG 24 hr capsule Take 1 capsule (30 mg total) by mouth every morning Max Daily Amount: 30 mg    hydrOXYzine HCL (ATARAX) 25 mg tablet Take 1 tablet (25 mg total) by mouth every 6 (six) hours as needed for anxiety    levothyroxine 75 mcg tablet Take 1 tablet (75 mcg total) by mouth daily    Multiple Vitamin (multivitamin) capsule Take 1 capsule by mouth daily    B-D 3CC LUER-BARBI SYR 25GX1/2" 25G X 1-1/2" 3 ML MISC use with VITAMIN B-12 SHOTS (Patient not taking: Reported on 11/21/2023)    cyanocobalamin 1,000 mcg/mL inject 1 milliliter ( 1000 MCG ) intramuscularly every week for 4 weeks (Patient not taking: Reported on 11/21/2023)    ergocalciferol (VITAMIN D2) 50,000 units Take 1 capsule (50,000 Units total) by mouth 2 (two) times a week with meals (Patient not taking: Reported on 11/21/2023)    Multiple Vitamins-Minerals (Multivitamin Adult) CHEW Chew (Patient not taking: Reported on 11/21/2023)       Objective     /74 (BP Location: Left arm, Patient Position: Sitting, Cuff Size: Standard)   Pulse (!) 108   Temp 98.1 °F (36.7 °C) (Tympanic)   Ht 5' 7" (1.702 m)   Wt 79.7 kg (175 lb 12.8 oz)   SpO2 100%   BMI 27.53 kg/m²     Physical Exam  Rubin Man, DO

## 2023-11-23 PROBLEM — R19.5 STOOL COLOR ABNORMAL: Status: ACTIVE | Noted: 2023-11-23

## 2023-11-27 ENCOUNTER — CLINICAL SUPPORT (OUTPATIENT)
Dept: FAMILY MEDICINE CLINIC | Facility: CLINIC | Age: 23
End: 2023-11-27

## 2023-11-27 DIAGNOSIS — E53.8 B12 DEFICIENCY: Primary | ICD-10-CM

## 2023-11-27 RX ORDER — CYANOCOBALAMIN 1000 UG/ML
1000 INJECTION, SOLUTION INTRAMUSCULAR; SUBCUTANEOUS
Status: SHIPPED | OUTPATIENT
Start: 2023-11-27

## 2023-11-27 RX ADMIN — CYANOCOBALAMIN 1000 MCG: 1000 INJECTION, SOLUTION INTRAMUSCULAR; SUBCUTANEOUS at 11:53

## 2023-12-11 DIAGNOSIS — F90.0 ATTENTION DEFICIT HYPERACTIVITY DISORDER (ADHD), PREDOMINANTLY INATTENTIVE TYPE: ICD-10-CM

## 2023-12-11 RX ORDER — DEXTROAMPHETAMINE SACCHARATE, AMPHETAMINE ASPARTATE MONOHYDRATE, DEXTROAMPHETAMINE SULFATE AND AMPHETAMINE SULFATE 7.5; 7.5; 7.5; 7.5 MG/1; MG/1; MG/1; MG/1
30 CAPSULE, EXTENDED RELEASE ORAL EVERY MORNING
Qty: 30 CAPSULE | Refills: 0 | Status: SHIPPED | OUTPATIENT
Start: 2023-12-11

## 2023-12-11 NOTE — TELEPHONE ENCOUNTER
Requested medication(s) are due for refill today: Yes  Patient has already received a courtesy refill: No  Ot  amphetamine-dextroamphetamine (ADDERALL XR) 30 MG 24 hr capsule          Sig: Take 1 capsule (30 mg total) by mouth every morning Max Daily Amount: 30 mg    Disp: 30 capsule    Refills: 0    Start: 12/11/2023    Earliest Fill Date: 12/11/2023    Class: Normal    Non-formulary For: Attention deficit hyperactivity disorder (ADHD), predominantly inattentive type    Last ordered: 3 weeks ago (11/15/2023) by Debbie Claros DO    [facility]  [facility] 08:07 AM   Protocol Details This refill cannot be delegated    Valid encounter within last 6 months      To be filled at: [address], [address] [facility]   her reason request has been forwarded to provider:

## 2023-12-18 DIAGNOSIS — F41.9 ANXIETY: ICD-10-CM

## 2023-12-18 RX ORDER — BUSPIRONE HYDROCHLORIDE 5 MG/1
5 TABLET ORAL 2 TIMES DAILY
Qty: 60 TABLET | Refills: 0 | Status: SHIPPED | OUTPATIENT
Start: 2023-12-18

## 2024-01-11 DIAGNOSIS — F90.0 ATTENTION DEFICIT HYPERACTIVITY DISORDER (ADHD), PREDOMINANTLY INATTENTIVE TYPE: ICD-10-CM

## 2024-01-11 NOTE — TELEPHONE ENCOUNTER
Medication: Adderall  PDMP not done, no auth  Active agreement on file -Yes last signed 8/2/23

## 2024-01-14 RX ORDER — DEXTROAMPHETAMINE SACCHARATE, AMPHETAMINE ASPARTATE MONOHYDRATE, DEXTROAMPHETAMINE SULFATE AND AMPHETAMINE SULFATE 7.5; 7.5; 7.5; 7.5 MG/1; MG/1; MG/1; MG/1
30 CAPSULE, EXTENDED RELEASE ORAL EVERY MORNING
Qty: 30 CAPSULE | Refills: 0 | Status: SHIPPED | OUTPATIENT
Start: 2024-01-14

## 2024-01-15 ENCOUNTER — OFFICE VISIT (OUTPATIENT)
Dept: FAMILY MEDICINE CLINIC | Facility: CLINIC | Age: 24
End: 2024-01-15
Payer: COMMERCIAL

## 2024-01-15 VITALS
WEIGHT: 178.2 LBS | HEIGHT: 68 IN | TEMPERATURE: 96.7 F | DIASTOLIC BLOOD PRESSURE: 69 MMHG | OXYGEN SATURATION: 100 % | BODY MASS INDEX: 27.01 KG/M2 | SYSTOLIC BLOOD PRESSURE: 128 MMHG | HEART RATE: 83 BPM

## 2024-01-15 DIAGNOSIS — Z13.220 LIPID SCREENING: ICD-10-CM

## 2024-01-15 DIAGNOSIS — F41.9 ANXIETY: Primary | ICD-10-CM

## 2024-01-15 DIAGNOSIS — Z13.1 ENCOUNTER FOR SCREENING EXAMINATION FOR IMPAIRED GLUCOSE REGULATION AND DIABETES MELLITUS: ICD-10-CM

## 2024-01-15 DIAGNOSIS — Z11.8 SCREENING FOR CHLAMYDIAL DISEASE: ICD-10-CM

## 2024-01-15 DIAGNOSIS — Z79.899 LONG-TERM CURRENT USE OF STIMULANT: ICD-10-CM

## 2024-01-15 DIAGNOSIS — E03.9 ACQUIRED HYPOTHYROIDISM: ICD-10-CM

## 2024-01-15 PROCEDURE — 99214 OFFICE O/P EST MOD 30 MIN: CPT | Performed by: FAMILY MEDICINE

## 2024-01-15 NOTE — PROGRESS NOTES
"Name: Allegra Camacho      : 2000      MRN: [id number]  Encounter Provider: Malathi Lambert DO  Encounter Date: 1/15/2024   Encounter department: [facility]    Assessment & Plan     1. Anxiety    2. Long-term current use of stimulant    3. Encounter for screening examination for impaired glucose regulation and diabetes mellitus  -     Comprehensive metabolic panel; Future    4. Lipid screening  -     Lipid panel; Future    5. Screening for chlamydial disease  -     Chlamydia/GC amplified DNA by PCR; Future    6. Acquired hypothyroidism    Pt is still waiting on Psych appt; she did not start Buspar that was rx'd last visit here in November, but states she has been doing well with her anxiety sx with PRN hydroxyzine       Subjective      Chief Complaint   Patient presents with    Follow-up     4 week       Short interval f/u  Last visit here in November was Follow up from ER visit - pt had been seen at ER 10/27 with blurry vision and near syncope c/o and etiology was determined to likely be anxiety and pt was given rx hydroxyzine; at visit, I d/w pt difficulty treating anxiety when pt is on a stimulant (adderall) and that I'll place order for psychiatry eval due to combined dx ADHD treated with stimulant + anxiety disorder, and in meantime I gave rx to Start Buspar and advised continue PRN hydroxyzine  Pt states today that she never started the Buspar, states, \"I have anxiety, but when it happens it's once a year, so didn't start it\"  Pt is still waiting on Psych appt  Pt also has chronic hypothyroidism with TSH normal in October   Last refill of levothyroxine August for 90 day no rf, but pt states her pharmacy auto refills- \"still good on the thyroid pills\"        2023  [facility]  Chief Complaint  Patient presents with  · Follow-up      Follow up from ED visit  ER f/u - seen at ER 10/27 with blurry vision and near syncope c/o and etiology was determined to " "likely be anxiety - was given rx hydroxyzine  Pt checked in late for her appt today and subsequently was not ready in room to be seen until 5 minutes remained of her appt  I d/w pt difficulty treating anxiety when pt is on a stimulant (adderall) and that I'll place order for psychiatry eval and in meantime continue hydroxyzine  Pt admits the hydroxyzine has been helping - \"only ever took 2 of them\"  \"Gets worse at night, and I feel like it's health-related - feel like I'm dying- get it from my grandmother- and I feel like it's worse now that it's winter\"  Also reports had black BM last night- \"don't know if it's the iron supplement I'm taking since gastric sleeve surgery\" (September 2022)  Assessment & Plan  1. Anxiety  -     Ambulatory referral to Psych Services; Future  -     busPIRone (BUSPAR) 5 mg tablet; Take 1 tablet (5 mg total) by mouth 2 (two) times a day  2. History of bariatric surgery  3. Attention deficit hyperactivity disorder (ADHD), other type  -     Ambulatory referral to Psych Services; Future  4. Stool color abnormal  Comments:  pt reports had \"black\" stool episode last night- she is on iron supplement - will monitor and call if has another episode  Start Buspar, continue PRN hydroxyzine, and I advised pt to schedule eval/treat with psychiatry due to combined dx ADHD treated with stimulant + anxiety disorder       Review of Systems    Current Outpatient Medications on File Prior to Visit   Medication Sig    amphetamine-dextroamphetamine (ADDERALL XR) 30 MG 24 hr capsule Take 1 capsule (30 mg total) by mouth every morning Max Daily Amount: 30 mg    B-D 3CC LUER-BARBI SYR 25GX1/2\" 25G X 1-1/2\" 3 ML MISC use with VITAMIN B-12 SHOTS    cyanocobalamin 1,000 mcg/mL inject 1 milliliter ( 1000 MCG ) intramuscularly every week for 4 weeks    hydrOXYzine HCL (ATARAX) 25 mg tablet Take 1 tablet (25 mg total) by mouth every 6 (six) hours as needed for anxiety    levothyroxine 75 mcg tablet Take 1 tablet (75 mcg " "total) by mouth daily    Multiple Vitamin (multivitamin) capsule Take 1 capsule by mouth daily    Multiple Vitamins-Minerals (Multivitamin Adult) CHEW Chew (Patient not taking: Reported on 11/21/2023)       Objective     /69 (BP Location: Right arm, Patient Position: Sitting, Cuff Size: Standard)   Pulse 83   Temp (!) 96.7 °F (35.9 °C) (Tympanic)   Ht 5' 7.5\" (1.715 m)   Wt 80.8 kg (178 lb 3.2 oz)   SpO2 100%   BMI 27.50 kg/m²     Physical Exam  Vitals and nursing note reviewed.   Constitutional:       General: She is not in acute distress.     Appearance: She is well-groomed. She is not ill-appearing, toxic-appearing or diaphoretic.   HENT:      Head: Normocephalic and atraumatic.      Mouth/Throat:      Pharynx: Uvula midline.   Neck:      Thyroid: No thyroid mass, thyromegaly or thyroid tenderness.      Trachea: Trachea and phonation normal.   Cardiovascular:      Rate and Rhythm: Normal rate and regular rhythm.      Heart sounds: Normal heart sounds.   Pulmonary:      Effort: Pulmonary effort is normal.      Breath sounds: Normal breath sounds and air entry.   Musculoskeletal:      Cervical back: Neck supple.   Skin:     Coloration: Skin is not pale.   Neurological:      Mental Status: She is alert and oriented to person, place, and time.   Psychiatric:         Attention and Perception: Attention normal.         Mood and Affect: Mood normal.         Speech: Speech normal.         Behavior: Behavior normal. Behavior is cooperative.         Thought Content: Thought content normal.         Cognition and Memory: Cognition normal.       Malathi Lambert DO    "

## 2024-01-26 ENCOUNTER — TELEPHONE (OUTPATIENT)
Dept: FAMILY MEDICINE CLINIC | Facility: CLINIC | Age: 24
End: 2024-01-26

## 2024-01-26 ENCOUNTER — TELEPHONE (OUTPATIENT)
Dept: BARIATRICS | Facility: CLINIC | Age: 24
End: 2024-01-26

## 2024-01-26 NOTE — LETTER
January 26, 2024     Patient: Allegra Camacho  YOB: 2000        To Whom It May Concern:    Allegra Camacho is under my professional care. She has a long history of ADHD, having been diagnosed in childhood many years ago by her Pediatrician and treated with Concerta.  ADHD is a chronic condition that affects all aspects of a patient's life and can be debilitating.    If you have any questions or concerns, please don't hesitate to call.         Sincerely,          Malathi Lambert,         CC: No Recipients

## 2024-01-26 NOTE — TELEPHONE ENCOUNTER
----- Message from Miguel Sosa RN sent at 2024  9:38 AM EST -----  Regardin year follow up  Please contact patient to schedule a 1 year follow up appointment and document the results of the call in EPIC.  Thank You      LVM to return call to schedule annual appointment.

## 2024-01-26 NOTE — TELEPHONE ENCOUNTER
Ok- letter generated in chart          January 22, 2024  AF    1/22/24  9:02 AM  Bharati Montano routed this conversation to Me  Bharati Dusty  AF    1/22/24  9:02 AM  Unsigned Note     Please advise if a letter can be written regarding her ADHD diagnosis.       MR    1/22/24  8:47 AM  Melany Mccurdy MA routed this conversation to [facility]  January 20, 2024  Allegra Camacho   to  [facility] (supporting You)     1/20/24  4:28 PM  Jany Lambert,  I am currently trying to get my medical assistance insurance back. However, they did deny me. I was wondering if you could write a letter to support my cause in regards to my ADHD and how it affects my life. I was diagnosed at a young age so if you do have any records from my pediatrician that will help as well. Thank you in advance

## 2024-02-02 ENCOUNTER — OFFICE VISIT (OUTPATIENT)
Dept: FAMILY MEDICINE CLINIC | Facility: CLINIC | Age: 24
End: 2024-02-02
Payer: COMMERCIAL

## 2024-02-02 ENCOUNTER — LAB (OUTPATIENT)
Dept: LAB | Facility: CLINIC | Age: 24
End: 2024-02-02
Payer: COMMERCIAL

## 2024-02-02 ENCOUNTER — TELEPHONE (OUTPATIENT)
Dept: BARIATRICS | Facility: CLINIC | Age: 24
End: 2024-02-02

## 2024-02-02 VITALS
DIASTOLIC BLOOD PRESSURE: 78 MMHG | WEIGHT: 169.4 LBS | TEMPERATURE: 97.1 F | HEIGHT: 68 IN | BODY MASS INDEX: 25.67 KG/M2 | HEART RATE: 95 BPM | SYSTOLIC BLOOD PRESSURE: 110 MMHG | OXYGEN SATURATION: 99 %

## 2024-02-02 DIAGNOSIS — Z00.00 ANNUAL PHYSICAL EXAM: Primary | ICD-10-CM

## 2024-02-02 DIAGNOSIS — R19.8 ALTERNATING CONSTIPATION AND DIARRHEA: ICD-10-CM

## 2024-02-02 DIAGNOSIS — Z12.4 CERVICAL CANCER SCREENING: ICD-10-CM

## 2024-02-02 DIAGNOSIS — K91.2 POSTSURGICAL MALABSORPTION: ICD-10-CM

## 2024-02-02 DIAGNOSIS — E55.9 VITAMIN D INSUFFICIENCY: ICD-10-CM

## 2024-02-02 DIAGNOSIS — Z13.220 LIPID SCREENING: ICD-10-CM

## 2024-02-02 DIAGNOSIS — E53.8 VITAMIN B12 DEFICIENCY: ICD-10-CM

## 2024-02-02 DIAGNOSIS — F90.8 ATTENTION DEFICIT HYPERACTIVITY DISORDER (ADHD), OTHER TYPE: ICD-10-CM

## 2024-02-02 DIAGNOSIS — Z13.1 ENCOUNTER FOR SCREENING EXAMINATION FOR IMPAIRED GLUCOSE REGULATION AND DIABETES MELLITUS: ICD-10-CM

## 2024-02-02 DIAGNOSIS — Z79.899 LONG-TERM CURRENT USE OF STIMULANT: ICD-10-CM

## 2024-02-02 DIAGNOSIS — Z11.8 SCREENING FOR CHLAMYDIAL DISEASE: ICD-10-CM

## 2024-02-02 DIAGNOSIS — E03.9 ACQUIRED HYPOTHYROIDISM: ICD-10-CM

## 2024-02-02 DIAGNOSIS — K91.2 POSTSURGICAL MALABSORPTION: Primary | ICD-10-CM

## 2024-02-02 DIAGNOSIS — R79.0 LOW FERRITIN: ICD-10-CM

## 2024-02-02 DIAGNOSIS — Z98.84 HISTORY OF BARIATRIC SURGERY: ICD-10-CM

## 2024-02-02 DIAGNOSIS — Z79.899 CONTROLLED SUBSTANCE AGREEMENT SIGNED: ICD-10-CM

## 2024-02-02 DIAGNOSIS — K64.9 HEMORRHOIDS, UNSPECIFIED HEMORRHOID TYPE: ICD-10-CM

## 2024-02-02 PROBLEM — R19.5 STOOL COLOR ABNORMAL: Status: RESOLVED | Noted: 2023-11-23 | Resolved: 2024-02-02

## 2024-02-02 LAB
25(OH)D3 SERPL-MCNC: 34.9 NG/ML (ref 30–100)
ALBUMIN SERPL BCP-MCNC: 4.4 G/DL (ref 3.5–5)
ALP SERPL-CCNC: 43 U/L (ref 34–104)
ALT SERPL W P-5'-P-CCNC: 9 U/L (ref 7–52)
ANION GAP SERPL CALCULATED.3IONS-SCNC: 8 MMOL/L
AST SERPL W P-5'-P-CCNC: 12 U/L (ref 13–39)
BILIRUB SERPL-MCNC: 0.69 MG/DL (ref 0.2–1)
BUN SERPL-MCNC: 8 MG/DL (ref 5–25)
CALCIUM SERPL-MCNC: 9.5 MG/DL (ref 8.4–10.2)
CHLORIDE SERPL-SCNC: 103 MMOL/L (ref 96–108)
CHOLEST SERPL-MCNC: 145 MG/DL
CO2 SERPL-SCNC: 27 MMOL/L (ref 21–32)
CREAT SERPL-MCNC: 0.93 MG/DL (ref 0.6–1.3)
FERRITIN SERPL-MCNC: 48 NG/ML (ref 11–307)
GFR SERPL CREATININE-BSD FRML MDRD: 86 ML/MIN/1.73SQ M
GLUCOSE P FAST SERPL-MCNC: 80 MG/DL (ref 65–99)
HDLC SERPL-MCNC: 66 MG/DL
IRON SATN MFR SERPL: 32 % (ref 15–50)
IRON SERPL-MCNC: 114 UG/DL (ref 50–212)
LDLC SERPL CALC-MCNC: 71 MG/DL (ref 0–100)
NONHDLC SERPL-MCNC: 79 MG/DL
POTASSIUM SERPL-SCNC: 3.7 MMOL/L (ref 3.5–5.3)
PROT SERPL-MCNC: 7.2 G/DL (ref 6.4–8.4)
SODIUM SERPL-SCNC: 138 MMOL/L (ref 135–147)
TIBC SERPL-MCNC: 353 UG/DL (ref 250–450)
TRIGL SERPL-MCNC: 41 MG/DL
UIBC SERPL-MCNC: 239 UG/DL (ref 155–355)
VIT B12 SERPL-MCNC: 237 PG/ML (ref 180–914)

## 2024-02-02 PROCEDURE — 99214 OFFICE O/P EST MOD 30 MIN: CPT | Performed by: FAMILY MEDICINE

## 2024-02-02 PROCEDURE — 82728 ASSAY OF FERRITIN: CPT

## 2024-02-02 PROCEDURE — 99395 PREV VISIT EST AGE 18-39: CPT | Performed by: FAMILY MEDICINE

## 2024-02-02 PROCEDURE — 80053 COMPREHEN METABOLIC PANEL: CPT

## 2024-02-02 PROCEDURE — 82306 VITAMIN D 25 HYDROXY: CPT

## 2024-02-02 PROCEDURE — 83540 ASSAY OF IRON: CPT

## 2024-02-02 PROCEDURE — 82607 VITAMIN B-12: CPT

## 2024-02-02 PROCEDURE — 83550 IRON BINDING TEST: CPT

## 2024-02-02 PROCEDURE — 36415 COLL VENOUS BLD VENIPUNCTURE: CPT

## 2024-02-02 PROCEDURE — 80061 LIPID PANEL: CPT

## 2024-02-02 PROCEDURE — 83918 ORGANIC ACIDS TOTAL QUANT: CPT

## 2024-02-02 RX ADMIN — CYANOCOBALAMIN 1000 MCG: 1000 INJECTION, SOLUTION INTRAMUSCULAR; SUBCUTANEOUS at 13:42

## 2024-02-02 NOTE — PROGRESS NOTES
ADULT ANNUAL PHYSICAL  [facility] [place]    NAME: Allegra Camacho  AGE: 23 y.o. SEX: female  : 2000     DATE: 2024     Assessment and Plan:   Allegra was seen today for physical exam.    Diagnoses and all orders for this visit:    Annual physical exam    Attention deficit hyperactivity disorder (ADHD), other type    Long-term current use of stimulant    Acquired hypothyroidism    Postsurgical malabsorption    History of bariatric surgery    Controlled substance agreement signed  Comments:  2023    Alternating constipation and diarrhea  -     psyllium (METAMUCIL) 58.6 % powder; Take 1 packet by mouth daily    Hemorrhoids, unspecified hemorrhoid type    Cervical cancer screening  -     Ambulatory Referral to Obstetrics / Gynecology; Future        Problem List Items Addressed This Visit          Digestive    Postsurgical malabsorption       Endocrine    Acquired hypothyroidism       Other    Long-term current use of stimulant    History of bariatric surgery    Controlled substance agreement signed    ADHD (attention deficit hyperactivity disorder)     Other Visit Diagnoses       Annual physical exam    -  Primary    Alternating constipation and diarrhea        Relevant Medications    psyllium (METAMUCIL) 58.6 % powder    Hemorrhoids, unspecified hemorrhoid type        Cervical cancer screening        Relevant Orders    Ambulatory Referral to Obstetrics / Gynecology          Refills were just given couple of weeks ago for her Adderall, and pt reports today that pharmacy keeps refilling her synthroid (though last refill auth was 2023 for 90 tablets per chart review/reconciliation of med list)    Immunizations and preventive care screenings were discussed with patient today. Appropriate education was printed on patient's after visit summary.    Counseling:  Exercise: the importance of regular exercise/physical activity was discussed. Recommend exercise 3-5  "times per week for at least 30 minutes.          No follow-ups on file.     Chief Complaint:     Chief Complaint   Patient presents with    Physical Exam     Physical + B12 injection      History of Present Illness:     Adult Annual Physical   Patient here for a comprehensive physical exam plus problem-focused f/u of chronic medical conditions.   The patient reports  no interval acute problems .    Diet and Physical Activity  Diet/Nutrition: well balanced diet.   Exercise: walking.      Depression Screening  PHQ-2/9 Depression Screening           General Health  Sleep:    . 6-8 hours on average  Hearing: normal - bilateral.  Vision: goes for regular eye exams.   Dental: regular dental visits.       /GYN Health  Follows with gynecology? yes although last visit was 06/2021  Contraceptive method:  none currently .  History of STDs?: no.     Advanced Care Planning  Do you have an advanced directive? no  Do you have a durable medical power of ? no     Review of Systems:     Review of Systems   Past Medical History:     Past Medical History:   Diagnosis Date    ADHD (attention deficit hyperactivity disorder)     Hypothyroid     Morbid obesity with BMI of 45.0-49.9, adult ([facility])     Preop cardiovascular exam 06/29/2016    Last Assessment & Plan:   Contraceptive options were reviewed including hormonal methods, both combination (pill, patch, vaginal ring) and progesterone-only (pill, Depo Provera, Implanon), intrauterine devices (Mirena, Paragard), barrier methods (condoms, diaphragm), and male and female sterilization.  The mechanism, risks, benefits, and side effects of all methods were discussed.  Doing well on O    Stool color abnormal 11/23/2023    pt reports had \"black\" stool episode last night- she is on iron supplement - will monitor and call if has another episode      Past Surgical History:     Past Surgical History:   Procedure Laterality Date    EGD      FRACTURE SURGERY      [facility] LONGITUDINAL GAST[facility]OMY N/A 09/12/2022    Procedure: GASTRECTOMY SLEEVE LAPAROSCOPIC AND INTAOPERATIVE EGD;  Surgeon: Arben Villatoro MD;  Location: [facility];  Service: Bariatrics    WISDOM TOOTH EXTRACTION      WRIST SURGERY Right     secondary to a fracture      Social History:     Social History     Socioeconomic History    Marital status: Single     Spouse name: None    Number of children: None    Years of education: None    Highest education level: None   Occupational History    None   Tobacco Use    Smoking status: Former     Types: Cigarettes, E-Cigarettes    Smokeless tobacco: Former   Vaping Use    Vaping status: Former    Quit date: 12/1/2022    Substances: Nicotine, Flavoring   Substance and Sexual Activity    Alcohol use: Yes     Comment: sociably    Drug use: Never    Sexual activity: Yes     Partners: Male     Birth control/protection: Condom Male, Other   Other Topics Concern    None   Social History Narrative    None     Social Determinants of Health     Financial Resource Strain: Not on file   Food Insecurity: Not on file   Transportation Needs: No Transportation Needs (5/12/2021)    PRAPARE - Transportation     Lack of Transportation (Medical): No     Lack of Transportation (Non-Medical): No   Physical Activity: Not on file   Stress: Not on file   Social Connections: Not on file   Intimate Partner Violence: Not on file   Housing Stability: Not on file      Family History:     Family History   Problem Relation Age of Onset    Heart disease Paternal Grandfather     Diabetes Paternal Grandfather     Lung cancer Paternal Grandfather     Diabetes Sister         prediabetes    Thyroid disease Paternal Grandmother     ADD / ADHD Brother     Stroke Neg Hx       Current Medications:     Current Outpatient Medications   Medication Sig Dispense Refill    amphetamine-dextroamphetamine (ADDERALL XR) 30 MG 24 hr capsule Take 1 capsule (30 mg total) by mouth every morning Max Daily Amount: 30 mg 30 capsule 0  "   cyanocobalamin 1,000 mcg/mL inject 1 milliliter ( 1000 MCG ) intramuscularly every week for 4 weeks      hydrOXYzine HCL (ATARAX) 25 mg tablet Take 1 tablet (25 mg total) by mouth every 6 (six) hours as needed for anxiety 25 tablet 0    levothyroxine 75 mcg tablet Take 1 tablet (75 mcg total) by mouth daily 90 tablet 0    Multiple Vitamin (multivitamin) capsule Take 1 capsule by mouth daily      psyllium (METAMUCIL) 58.6 % powder Take 1 packet by mouth daily 425 g 1    Multiple Vitamins-Minerals (Multivitamin Adult) CHEW Chew (Patient not taking: Reported on 11/21/2023)       Current Facility-Administered Medications   Medication Dose Route Frequency Provider Last Rate Last Admin    cyanocobalamin injection 1,000 mcg  1,000 mcg Intramuscular Q30 Days Brandon Mabry MD   1,000 mcg at 02/02/24 1342      Allergies:     No Known Allergies   Physical Exam:     /78 (BP Location: Left arm, Patient Position: Sitting, Cuff Size: Standard)   Pulse 95   Temp (!) 97.1 °F (36.2 °C) (Tympanic)   Ht 5' 7.5\" (1.715 m)   Wt 76.8 kg (169 lb 6.4 oz)   SpO2 99%   BMI 26.14 kg/m²     Physical Exam     Malathi Lambert DO   [facility] [place]    "

## 2024-02-02 NOTE — RESULT ENCOUNTER NOTE
Please advise patient of post op labs:    -Your vitamin D has improved, but is now low normal:  Please add an additional 2,000 IU of Vitamin D3 per day in addition to the 3,000IU of Vitamin D you are currently taking in your Bariatric MVI.  Vitamin D is best absorbed with food, so take it with your largest meal of the day. It can be found inexpensively over the counter at your pharmacy or online.    Remember to also take 1500 mg calcium citrate per day total (taken 500 mg at a time, three times per day). It is very important that you separate each dose by at least 2 hours and take calcium at least 2 hours apart from MVI and iron.    - B12 is improving but is still low - please Take an additional 2,000mcg sublingual B12 daily x 3 months. This can be found inexpensively OTC. I also recommend 1,000mcg IM B12 shots in the office; once weekly x 4 doses.    -Ferritin (iron stores) are improving - continue with current iron regimen

## 2024-02-02 NOTE — TELEPHONE ENCOUNTER
Left message on patient voice mail. Explained Scarlet Gray PA-C has recommendations based on her blood work. Informed patient note being sent to her MY CHART PORTAL for review. Patient to call to schedule B12 injections weekly for 4 weeks.

## 2024-02-06 LAB — METHYLMALONATE SERPL-SCNC: 152 NMOL/L (ref 0–378)

## 2024-02-14 DIAGNOSIS — F90.0 ATTENTION DEFICIT HYPERACTIVITY DISORDER (ADHD), PREDOMINANTLY INATTENTIVE TYPE: ICD-10-CM

## 2024-02-14 RX ORDER — DEXTROAMPHETAMINE SACCHARATE, AMPHETAMINE ASPARTATE MONOHYDRATE, DEXTROAMPHETAMINE SULFATE AND AMPHETAMINE SULFATE 7.5; 7.5; 7.5; 7.5 MG/1; MG/1; MG/1; MG/1
30 CAPSULE, EXTENDED RELEASE ORAL EVERY MORNING
Qty: 30 CAPSULE | Refills: 0 | Status: SHIPPED | OUTPATIENT
Start: 2024-02-14

## 2024-03-01 ENCOUNTER — TELEPHONE (OUTPATIENT)
Dept: FAMILY MEDICINE CLINIC | Facility: CLINIC | Age: 24
End: 2024-03-01

## 2024-03-05 ENCOUNTER — TELEPHONE (OUTPATIENT)
Age: 24
End: 2024-03-05

## 2024-03-05 ENCOUNTER — CLINICAL SUPPORT (OUTPATIENT)
Dept: FAMILY MEDICINE CLINIC | Facility: CLINIC | Age: 24
End: 2024-03-05
Payer: COMMERCIAL

## 2024-03-05 DIAGNOSIS — E53.8 B12 DEFICIENCY: Primary | ICD-10-CM

## 2024-03-05 PROCEDURE — 96372 THER/PROPH/DIAG INJ SC/IM: CPT

## 2024-03-05 RX ADMIN — CYANOCOBALAMIN 1000 MCG: 1000 INJECTION, SOLUTION INTRAMUSCULAR; SUBCUTANEOUS at 11:42

## 2024-03-05 NOTE — TELEPHONE ENCOUNTER
Last seeen 2/2    Pt called to scheduled her b12 shot with nurse as previous needed to be rescheduled from office.  Pt is schedled

## 2024-03-22 DIAGNOSIS — Z00.6 ENCOUNTER FOR EXAMINATION FOR NORMAL COMPARISON OR CONTROL IN CLINICAL RESEARCH PROGRAM: ICD-10-CM

## 2024-03-25 ENCOUNTER — APPOINTMENT (OUTPATIENT)
Dept: LAB | Facility: CLINIC | Age: 24
End: 2024-03-25

## 2024-03-25 DIAGNOSIS — F90.0 ATTENTION DEFICIT HYPERACTIVITY DISORDER (ADHD), PREDOMINANTLY INATTENTIVE TYPE: ICD-10-CM

## 2024-03-25 DIAGNOSIS — Z00.6 ENCOUNTER FOR EXAMINATION FOR NORMAL COMPARISON OR CONTROL IN CLINICAL RESEARCH PROGRAM: ICD-10-CM

## 2024-03-25 PROCEDURE — 36415 COLL VENOUS BLD VENIPUNCTURE: CPT

## 2024-03-28 RX ORDER — DEXTROAMPHETAMINE SACCHARATE, AMPHETAMINE ASPARTATE MONOHYDRATE, DEXTROAMPHETAMINE SULFATE AND AMPHETAMINE SULFATE 7.5; 7.5; 7.5; 7.5 MG/1; MG/1; MG/1; MG/1
30 CAPSULE, EXTENDED RELEASE ORAL EVERY MORNING
Qty: 30 CAPSULE | Refills: 0 | Status: SHIPPED | OUTPATIENT
Start: 2024-03-28

## 2024-03-28 NOTE — TELEPHONE ENCOUNTER
Patient called requesting an update on the medication refill. Please call patient to advise.    FAMILY HISTORY:  Father  Still living? Unknown  Family history of heart disease, Age at diagnosis: Age Unknown    Mother  Still living? No  Family history of heart disease, Age at diagnosis: Age Unknown

## 2024-04-02 DIAGNOSIS — E03.9 ACQUIRED HYPOTHYROIDISM: ICD-10-CM

## 2024-04-02 RX ORDER — LEVOTHYROXINE SODIUM 0.07 MG/1
75 TABLET ORAL DAILY
Qty: 90 TABLET | Refills: 1 | Status: SHIPPED | OUTPATIENT
Start: 2024-04-02

## 2024-04-05 DIAGNOSIS — E53.8 B12 DEFICIENCY: Primary | ICD-10-CM

## 2024-04-05 RX ORDER — CYANOCOBALAMIN 1000 UG/ML
1000 INJECTION, SOLUTION INTRAMUSCULAR; SUBCUTANEOUS
Status: DISCONTINUED | OUTPATIENT
Start: 2024-04-05 | End: 2024-04-06

## 2024-04-12 LAB
APOB+LDLR+PCSK9 GENE MUT ANL BLD/T: NOT DETECTED
BRCA1+BRCA2 DEL+DUP + FULL MUT ANL BLD/T: NOT DETECTED
MLH1+MSH2+MSH6+PMS2 GN DEL+DUP+FUL M: NOT DETECTED

## 2024-04-24 DIAGNOSIS — F90.0 ATTENTION DEFICIT HYPERACTIVITY DISORDER (ADHD), PREDOMINANTLY INATTENTIVE TYPE: ICD-10-CM

## 2024-04-26 RX ORDER — DEXTROAMPHETAMINE SACCHARATE, AMPHETAMINE ASPARTATE MONOHYDRATE, DEXTROAMPHETAMINE SULFATE AND AMPHETAMINE SULFATE 7.5; 7.5; 7.5; 7.5 MG/1; MG/1; MG/1; MG/1
30 CAPSULE, EXTENDED RELEASE ORAL EVERY MORNING
Qty: 30 CAPSULE | Refills: 0 | Status: SHIPPED | OUTPATIENT
Start: 2024-04-26

## 2024-05-24 DIAGNOSIS — F90.0 ATTENTION DEFICIT HYPERACTIVITY DISORDER (ADHD), PREDOMINANTLY INATTENTIVE TYPE: ICD-10-CM

## 2024-05-25 RX ORDER — DEXTROAMPHETAMINE SACCHARATE, AMPHETAMINE ASPARTATE MONOHYDRATE, DEXTROAMPHETAMINE SULFATE AND AMPHETAMINE SULFATE 7.5; 7.5; 7.5; 7.5 MG/1; MG/1; MG/1; MG/1
30 CAPSULE, EXTENDED RELEASE ORAL EVERY MORNING
Qty: 30 CAPSULE | Refills: 0 | Status: SHIPPED | OUTPATIENT
Start: 2024-05-25

## 2024-06-17 ENCOUNTER — ANNUAL EXAM (OUTPATIENT)
Dept: OBGYN CLINIC | Facility: CLINIC | Age: 24
End: 2024-06-17
Payer: COMMERCIAL

## 2024-06-17 VITALS
SYSTOLIC BLOOD PRESSURE: 114 MMHG | HEIGHT: 68 IN | WEIGHT: 170 LBS | BODY MASS INDEX: 25.76 KG/M2 | DIASTOLIC BLOOD PRESSURE: 78 MMHG

## 2024-06-17 DIAGNOSIS — Z01.419 ENCOUNTER FOR WELL WOMAN EXAM WITH ROUTINE GYNECOLOGICAL EXAM: Primary | ICD-10-CM

## 2024-06-17 PROCEDURE — G0145 SCR C/V CYTO,THINLAYER,RESCR: HCPCS | Performed by: STUDENT IN AN ORGANIZED HEALTH CARE EDUCATION/TRAINING PROGRAM

## 2024-06-17 PROCEDURE — 99395 PREV VISIT EST AGE 18-39: CPT | Performed by: STUDENT IN AN ORGANIZED HEALTH CARE EDUCATION/TRAINING PROGRAM

## 2024-06-17 NOTE — PROGRESS NOTES
[facility] [address] Maegan Hughes PA    ASSESSMENT/PLAN: Allegra Camacho is a 24 y.o.  who presents for annual gynecologic exam.    Encounter for routine gynecologic examination  - Routine well woman exam completed today.  - Cervical Cancer Screening: Current ASCCP Guidelines reviewed. Last Pap: 2021. Next Pap Due: done today  - HPV Vaccination status: Immunization series complete  - STI screening offered including HIV testing: Declined  - Contraceptive counseling discussed.  Current contraception: condoms:     Additional problems addressed during this visit:  1. Encounter for well woman exam with routine gynecological exam  -     Liquid-based pap, screening      CC:  Annual Gynecologic Examination    HPI: Allegra Camacho is a 24 y.o.  who presents for annual gynecologic examination.  She reports  no new changes to her health.  She reports no breast concerns. She gets regular periods. She has no vaginal discharge, vulvar or vaginal lesions, pelvic pain, or abnormal bleeding.  She has no sexual health concerns and is currently sexually active with one male partner.  She contracepts with condoms. She has no symptoms of pelvic organ prolapse, urinary, or fecal incontinence.  She denies intimate partner violence.            The following portions of the patient's history were reviewed and updated as appropriate: allergies, current medications, past family history, past medical history, obstetric history, gynecologic history, past social history, past surgical history and problem list.    Review of Systems   Constitutional:  Negative for chills and fever.   Respiratory:  Negative for cough and shortness of breath.    Cardiovascular:  Negative for chest pain and leg swelling.   Gastrointestinal:  Negative for abdominal pain, nausea and vomiting.   Genitourinary:  Negative for dysuria, frequency and urgency.   Neurological:  Negative for dizziness, light-headedness and  "headaches.         Objective:  /78   Ht 5' 7.5\" (1.715 m)   Wt 77.1 kg (170 lb)   LMP 05/24/2024 (Exact Date)   BMI 26.23 kg/m²    Physical Exam  Chaperone present: chaparone offered, patient declined.   Constitutional:       Appearance: Normal appearance.   HENT:      Head: Normocephalic and atraumatic.   Cardiovascular:      Rate and Rhythm: Normal rate.   Pulmonary:      Effort: Pulmonary effort is normal.   Chest:   Breasts:     Breasts are symmetrical.      Right: Normal. No swelling, bleeding, inverted nipple, mass, nipple discharge, skin change or tenderness.      Left: Normal. No swelling, bleeding, inverted nipple, mass, nipple discharge, skin change or tenderness.   Abdominal:      General: There is no distension.      Tenderness: There is no abdominal tenderness. There is no guarding.   Genitourinary:     Exam position: Lithotomy position.      Pubic Area: No rash.       Labia:         Right: No rash, tenderness or lesion.         Left: No rash, tenderness or lesion.       Urethra: No prolapse, urethral swelling or urethral lesion.      Vagina: Normal. No vaginal discharge, erythema, tenderness, bleeding or lesions.      Cervix: No cervical motion tenderness, discharge, friability or erythema.      Uterus: Not enlarged, not tender and no uterine prolapse.       Adnexa:         Right: No mass, tenderness or fullness.          Left: No mass, tenderness or fullness.     Lymphadenopathy:      Upper Body:      Right upper body: No axillary adenopathy.      Left upper body: No axillary adenopathy.      Lower Body: No right inguinal adenopathy. No left inguinal adenopathy.   Neurological:      Mental Status: She is alert.           Deborah Hensley MD  [facility]  [facility]  6/17/2024 3:30 PM    "

## 2024-06-20 LAB
LAB AP GYN PRIMARY INTERPRETATION: NORMAL
Lab: NORMAL

## 2024-06-28 DIAGNOSIS — F90.0 ATTENTION DEFICIT HYPERACTIVITY DISORDER (ADHD), PREDOMINANTLY INATTENTIVE TYPE: ICD-10-CM

## 2024-07-01 ENCOUNTER — OFFICE VISIT (OUTPATIENT)
Dept: URGENT CARE | Facility: CLINIC | Age: 24
End: 2024-07-01
Payer: COMMERCIAL

## 2024-07-01 ENCOUNTER — APPOINTMENT (OUTPATIENT)
Dept: RADIOLOGY | Facility: CLINIC | Age: 24
End: 2024-07-01
Payer: COMMERCIAL

## 2024-07-01 VITALS
RESPIRATION RATE: 18 BRPM | HEART RATE: 83 BPM | TEMPERATURE: 98.2 F | SYSTOLIC BLOOD PRESSURE: 128 MMHG | OXYGEN SATURATION: 100 % | DIASTOLIC BLOOD PRESSURE: 82 MMHG

## 2024-07-01 DIAGNOSIS — S83.91XA SPRAIN OF RIGHT KNEE, UNSPECIFIED LIGAMENT, INITIAL ENCOUNTER: Primary | ICD-10-CM

## 2024-07-01 DIAGNOSIS — M25.561 RIGHT KNEE PAIN, UNSPECIFIED CHRONICITY: ICD-10-CM

## 2024-07-01 PROCEDURE — 99213 OFFICE O/P EST LOW 20 MIN: CPT | Performed by: PHYSICIAN ASSISTANT

## 2024-07-01 PROCEDURE — 73564 X-RAY EXAM KNEE 4 OR MORE: CPT

## 2024-07-01 RX ORDER — DEXTROAMPHETAMINE SACCHARATE, AMPHETAMINE ASPARTATE MONOHYDRATE, DEXTROAMPHETAMINE SULFATE AND AMPHETAMINE SULFATE 7.5; 7.5; 7.5; 7.5 MG/1; MG/1; MG/1; MG/1
30 CAPSULE, EXTENDED RELEASE ORAL EVERY MORNING
Qty: 30 CAPSULE | Refills: 0 | Status: SHIPPED | OUTPATIENT
Start: 2024-07-01

## 2024-07-01 NOTE — LETTER
July 1, 2024     Patient: Allegra Camacho   YOB: 2000   Date of Visit: 7/1/2024       To Whom It May Concern:    It is my medical opinion that Allegra Whalen return to work on 07/02/2024.  If you have any questions or concerns, please don't hesitate to call.         Sincerely,        Magaly Romero PA-C    CC: No Recipients

## 2024-07-01 NOTE — PROGRESS NOTES
[facility]        NAME: Allegra Camacho is a 24 y.o. female  : 2000    MRN: [id number]  DATE: 2024  TIME: 3:53 PM    Assessment and Plan   Sprain of right knee, unspecified ligament, initial encounter [S83.91XA]  1. Sprain of right knee, unspecified ligament, initial encounter  XR knee 4+ vw right injury    Ambulatory Referral to Orthopedic Surgery        Right knee xray: Pending Radiologist Interpretation, no gross abnormalities visualized.  ACE applied in clinic.  Recommend NSAID's and Tylenol.    Patient Instructions       Follow up with Orthopedics at earliest availability.  Proceed to  ER if symptoms worsen.    If tests have been performed at [facility], our office will contact you with results if changes need to be made to the care plan discussed with you at the visit.  You can review your full results on [facility] MyChart.    Chief Complaint     Chief Complaint   Patient presents with    Knee Pain     Right knee started yesterday          History of Present Illness       Patient is a 24 year old female presenting to [facility] with right knee pain.  Patient was playing kickball yesterday and stepped on a base the wrong way and felt pain to right knee at that time.  Pt denies falling at that time.  Pt has a hx of knee issues in the past.  Pt did lose 100 pounds and was informed of knee pain improving but did not resolve the chronic pain.  Pt did see Ortho and was referred to PT but has not yet completed any sessions.  Pt states feeling something moving in the right knee.    Knee Pain   The incident occurred 12 to 24 hours ago. [place] The injury mechanism is unknown. The pain is present in the right knee.       Review of Systems   Review of Systems   Constitutional:  Negative for chills and fever.   HENT:  Negative for ear pain and sore throat.    Eyes:  Negative for pain and visual disturbance.   Respiratory:  Negative for cough and shortness of breath.     Cardiovascular:  Negative for chest pain and palpitations.   Gastrointestinal:  Negative for abdominal pain and vomiting.   Genitourinary:  Negative for dysuria and hematuria.   Musculoskeletal:  Positive for arthralgias (Right knee pain). Negative for back pain.   Skin:  Negative for color change and rash.   Neurological:  Negative for seizures and syncope.   All other systems reviewed and are negative.        Current Medications       Current Outpatient Medications:     amphetamine-dextroamphetamine (ADDERALL XR) 30 MG 24 hr capsule, Take 1 capsule (30 mg total) by mouth every morning Max Daily Amount: 30 mg, Disp: 30 capsule, Rfl: 0    cyanocobalamin 1,000 mcg/mL, inject 1 milliliter ( 1000 MCG ) intramuscularly every week for 4 weeks, Disp: , Rfl:     hydrOXYzine HCL (ATARAX) 25 mg tablet, Take 1 tablet (25 mg total) by mouth every 6 (six) hours as needed for anxiety, Disp: 25 tablet, Rfl: 0    levothyroxine 75 mcg tablet, take 1 tablet by mouth once daily, Disp: 90 tablet, Rfl: 1    Multiple Vitamin (multivitamin) capsule, Take 1 capsule by mouth daily, Disp: , Rfl:     Multiple Vitamins-Minerals (Multivitamin Adult) CHEW, Chew (Patient not taking: Reported on 11/21/2023), Disp: , Rfl:     psyllium (METAMUCIL) 58.6 % powder, Take 1 packet by mouth daily (Patient not taking: Reported on 6/17/2024), Disp: 425 g, Rfl: 1    Current Allergies     Allergies as of 07/01/2024    (No Known Allergies)            The following portions of the patient's history were reviewed and updated as appropriate: allergies, current medications, past family history, past medical history, past social history, past surgical history and problem list.     Past Medical History:   Diagnosis Date    ADHD (attention deficit hyperactivity disorder)     Hypothyroid     Morbid obesity with BMI of 45.0-49.9, adult ([facility])     Preop cardiovascular exam 06/29/2016    Last Assessment & Plan:   Contraceptive options were reviewed including hormonal  "methods, both combination (pill, patch, vaginal ring) and progesterone-only (pill, Depo Provera, Implanon), intrauterine devices (Mirena, Paragard), barrier methods (condoms, diaphragm), and male and female sterilization.  The mechanism, risks, benefits, and side effects of all methods were discussed.  Doing well on O    Stool color abnormal 11/23/2023    pt reports had \"black\" stool episode last night- she is on iron supplement - will monitor and call if has another episode       Past Surgical History:   Procedure Laterality Date    EGD      FRACTURE SURGERY      TX LAPS GSTRC RSTRICTIV PX LONGITUDINAL GASTRECTOMY N/A 09/12/2022    Procedure: GASTRECTOMY SLEEVE LAPAROSCOPIC AND INTAOPERATIVE EGD;  Surgeon: Arben Villatoro MD;  Location: [facility];  Service: Bariatrics    WISDOM TOOTH EXTRACTION      WRIST SURGERY Right     secondary to a fracture       Family History   Problem Relation Age of Onset    Heart disease Paternal Grandfather     Diabetes Paternal Grandfather     Lung cancer Paternal Grandfather     Diabetes Sister         prediabetes    Thyroid disease Paternal Grandmother     ADD / ADHD Brother     Stroke Neg Hx          Medications have been verified.        Objective   /82   Pulse 83   Temp 98.2 °F (36.8 °C)   Resp 18   LMP 05/24/2024 (Exact Date)   SpO2 100%   Patient's last menstrual period was 05/24/2024 (exact date).       Physical Exam     Physical Exam  Constitutional:       Appearance: Normal appearance.   HENT:      Head: Normocephalic and atraumatic.      Nose: Nose normal.      Mouth/Throat:      Mouth: Mucous membranes are moist.   Eyes:      Extraocular Movements: Extraocular movements intact.      Conjunctiva/sclera: Conjunctivae normal.      Pupils: Pupils are equal, round, and reactive to light.   Cardiovascular:      Rate and Rhythm: Normal rate.   Pulmonary:      Effort: Pulmonary effort is normal.   Musculoskeletal:         General: Normal range of motion.      Cervical " back: Normal range of motion and neck supple.   Skin:     General: Skin is warm and dry.      Capillary Refill: Capillary refill takes less than 2 seconds.   Neurological:      General: No focal deficit present.      Mental Status: She is alert and oriented to person, place, and time.   Psychiatric:         Mood and Affect: Mood normal.         Behavior: Behavior normal.

## 2024-07-05 ENCOUNTER — APPOINTMENT (OUTPATIENT)
Dept: RADIOLOGY | Facility: CLINIC | Age: 24
End: 2024-07-05
Payer: COMMERCIAL

## 2024-07-05 ENCOUNTER — OFFICE VISIT (OUTPATIENT)
Dept: OBGYN CLINIC | Facility: CLINIC | Age: 24
End: 2024-07-05
Payer: COMMERCIAL

## 2024-07-05 VITALS
BODY MASS INDEX: 26.67 KG/M2 | DIASTOLIC BLOOD PRESSURE: 76 MMHG | HEART RATE: 70 BPM | WEIGHT: 176 LBS | TEMPERATURE: 97.9 F | SYSTOLIC BLOOD PRESSURE: 117 MMHG | HEIGHT: 68 IN

## 2024-07-05 DIAGNOSIS — G89.29 CHRONIC PAIN OF LEFT KNEE: ICD-10-CM

## 2024-07-05 DIAGNOSIS — S83.91XA SPRAIN OF RIGHT KNEE, UNSPECIFIED LIGAMENT, INITIAL ENCOUNTER: ICD-10-CM

## 2024-07-05 DIAGNOSIS — M94.9 OSTEOCHONDRAL LESION: ICD-10-CM

## 2024-07-05 DIAGNOSIS — M89.9 OSTEOCHONDRAL LESION: ICD-10-CM

## 2024-07-05 DIAGNOSIS — M25.562 CHRONIC PAIN OF LEFT KNEE: Primary | ICD-10-CM

## 2024-07-05 DIAGNOSIS — S89.91XA KNEE INJURY, RIGHT, INITIAL ENCOUNTER: ICD-10-CM

## 2024-07-05 DIAGNOSIS — G89.29 CHRONIC PAIN OF LEFT KNEE: Primary | ICD-10-CM

## 2024-07-05 DIAGNOSIS — M25.562 CHRONIC PAIN OF LEFT KNEE: ICD-10-CM

## 2024-07-05 PROCEDURE — 73562 X-RAY EXAM OF KNEE 3: CPT

## 2024-07-05 PROCEDURE — 99214 OFFICE O/P EST MOD 30 MIN: CPT | Performed by: FAMILY MEDICINE

## 2024-07-05 NOTE — PATIENT INSTRUCTIONS
F/u after MRI  MRI R knee-  R knee pain/injury/OCD lesion on XR- r/o unstable lesion   Knee immobilizer/crutches- no weight bearing.  Icing/heat/OTC pain meds as needed.

## 2024-07-05 NOTE — PROGRESS NOTES
[facility] [address]  [facility] [address] [phone]      Assessment:  1. Chronic pain of left knee  -     XR knee 3 vw left non injury; Future; Expected date: 07/05/2024  2. Sprain of right knee, unspecified ligament, initial encounter  -     Ambulatory Referral to Orthopedic Surgery  -     Crutches  3. Osteochondral lesion  -     MRI knee right  wo contrast; Future; Expected date: 07/05/2024  -     Knee Immobilizer  4. Knee injury, right, initial encounter  -     MRI knee right  wo contrast; Future; Expected date: 07/05/2024  -     Knee Immobilizer      Plan:  Patient Instructions   F/u after MRI  MRI R knee-  R knee pain/injury/OCD lesion on XR- r/o unstable lesion   Knee immobilizer/crutches- no weight bearing.  Icing/heat/OTC pain meds as needed.     Return for f/u after MRI R knee, Recheck.    Chief Complaint:  Chief Complaint   Patient presents with    Right Knee - Pain       Subjective:   HPI    Patient ID: Allegra Camacho is a 24 y.o. female     Here c/o R knee pain  She was playing kickball last Sunday- she was walking back to [facility] base and stepped down the wrong way and felt pain  Swelled/giving out  No locking  Tylenol PRN- helps  Sharp pain  No pain rest  Pain walking  Seen in [facility]. XR done. Note reviewed.  Hx of OCD lesion R knee- unstablle on MRI, intermittent pain  Having some L knee pain for a few years- denies injury      XR KNEE 4+ VW RIGHT INJURY     INDICATION: M25.561: Pain in right knee.     COMPARISON: MR 12/31/2021     FINDINGS:     Osteochondral lesion lateral weightbearing aspect medial femoral condyle measuring up to 2.2 cm     No joint effusion.     Mild medial compartment joint space narrowing and productive spurring.     No lytic or blastic osseous lesion.     Unremarkable soft tissues.     IMPRESSION:     No acute osseous abnormality.  Redemonstration of previously characterized lateral aspect of medial femoral  "condyle osteochondral lesion.    Review of Systems   Constitutional:  Negative for fatigue and fever.   Respiratory:  Negative for shortness of breath.    Cardiovascular:  Negative for chest pain.   Gastrointestinal:  Negative for abdominal pain and nausea.   Genitourinary:  Negative for dysuria.   Musculoskeletal:  Positive for arthralgias.   Skin:  Negative for rash and wound.   Neurological:  Negative for weakness and headaches.       Objective:  Vitals:  /76 (BP Location: Right arm, Patient Position: Sitting, Cuff Size: Standard)   Pulse 70   Temp 97.9 °F (36.6 °C) (Tympanic)   Ht 5' 7.5\" (1.715 m)   Wt 79.8 kg (176 lb)   LMP 05/24/2024 (Exact Date)   BMI 27.16 kg/m²     The following portions of the patient's history were reviewed and updated as appropriate: allergies, current medications, past family history, past medical history, past social history, past surgical history, and problem list.    Physical exam:  Physical Exam  Constitutional:       Appearance: Normal appearance. She is normal weight.   HENT:      Head: Normocephalic.   Eyes:      Extraocular Movements: Extraocular movements intact.   Pulmonary:      Effort: Pulmonary effort is normal.   Musculoskeletal:      Cervical back: Normal range of motion.      Right knee: Effusion present.      Instability Tests: Medial Jimenez test negative and lateral Jimenez test negative.   Skin:     General: Skin is warm and dry.   Neurological:      General: No focal deficit present.      Mental Status: She is alert and oriented to person, place, and time. Mental status is at baseline.   Psychiatric:         Mood and Affect: Mood normal.         Behavior: Behavior normal.         Thought Content: Thought content normal.         Judgment: Judgment normal.       Right Knee Exam     Tenderness   The patient is experiencing tenderness in the medial retinaculum.    Range of Motion   The patient has normal right knee ROM.    Tests   Jimenez:  Medial - " negative Lateral - negative  Varus: negative Valgus: negative  Patellar apprehension: positive    Other   Swelling: mild  Effusion: effusion present          Observations     Right Knee   Positive for effusion.       I have personally reviewed pertinent films in PACS and my interpretation is XR-  R knee- lateral medial femoral condyle OC lesion. No fx      Mario Christopher MD

## 2024-07-12 ENCOUNTER — HOSPITAL ENCOUNTER (OUTPATIENT)
Dept: MRI IMAGING | Facility: HOSPITAL | Age: 24
End: 2024-07-12
Attending: FAMILY MEDICINE
Payer: COMMERCIAL

## 2024-07-12 DIAGNOSIS — S89.91XA KNEE INJURY, RIGHT, INITIAL ENCOUNTER: ICD-10-CM

## 2024-07-12 DIAGNOSIS — M94.9 OSTEOCHONDRAL LESION: ICD-10-CM

## 2024-07-12 DIAGNOSIS — M89.9 OSTEOCHONDRAL LESION: ICD-10-CM

## 2024-07-12 PROCEDURE — 73721 MRI JNT OF LWR EXTRE W/O DYE: CPT

## 2024-07-25 DIAGNOSIS — F90.0 ATTENTION DEFICIT HYPERACTIVITY DISORDER (ADHD), PREDOMINANTLY INATTENTIVE TYPE: ICD-10-CM

## 2024-07-26 RX ORDER — DEXTROAMPHETAMINE SACCHARATE, AMPHETAMINE ASPARTATE MONOHYDRATE, DEXTROAMPHETAMINE SULFATE AND AMPHETAMINE SULFATE 7.5; 7.5; 7.5; 7.5 MG/1; MG/1; MG/1; MG/1
30 CAPSULE, EXTENDED RELEASE ORAL EVERY MORNING
Qty: 30 CAPSULE | Refills: 0 | Status: SHIPPED | OUTPATIENT
Start: 2024-07-31

## 2024-07-29 ENCOUNTER — OFFICE VISIT (OUTPATIENT)
Dept: OBGYN CLINIC | Facility: CLINIC | Age: 24
End: 2024-07-29
Payer: COMMERCIAL

## 2024-07-29 VITALS
HEIGHT: 68 IN | HEART RATE: 98 BPM | SYSTOLIC BLOOD PRESSURE: 133 MMHG | BODY MASS INDEX: 26.52 KG/M2 | WEIGHT: 175 LBS | DIASTOLIC BLOOD PRESSURE: 82 MMHG | TEMPERATURE: 99.1 F

## 2024-07-29 DIAGNOSIS — S89.91XD RIGHT KNEE INJURY, SUBSEQUENT ENCOUNTER: ICD-10-CM

## 2024-07-29 DIAGNOSIS — M95.8 OSTEOCHONDRAL DEFECT OF CONDYLE OF FEMUR: Primary | ICD-10-CM

## 2024-07-29 PROCEDURE — 99214 OFFICE O/P EST MOD 30 MIN: CPT | Performed by: FAMILY MEDICINE

## 2024-07-29 NOTE — LETTER
July 29, 2024     Patient: Allegra Camacho  YOB: 2000  Date of Visit: 7/29/2024      To Whom it May Concern:    Allegra Camacho is under my professional care. Allegra was seen in my office on 7/29/2024.     If you have any questions or concerns, please don't hesitate to call.         Sincerely,          Mario Christopher MD        CC: No Recipients

## 2024-07-29 NOTE — PROGRESS NOTES
[facility] [address] [id number] [phone]      Assessment:  1. Osteochondral defect of condyle of femur  -     Ambulatory Referral to Orthopedic Surgery; Future  2. Right knee injury, subsequent encounter  -     Ambulatory Referral to Orthopedic Surgery; Future      Plan:  Patient Instructions   F/u here as needed  Referral to Ortho- Dr. Do  Crutches as needed  Continue wearing knee brace.   Return for referral to ortho- Andra Clements.    Chief Complaint:  Chief Complaint   Patient presents with    Right Knee - Follow-up       Subjective:   HPI    Patient ID: Allegra Camacho is a 24 y.o. female     Here for f/u R knee pain/MRI  Having less pain  Swelled up again- turning the wrong way  Wearing knee brace/ stopped using crutches  Wearing hinged knee brace.  No pain meds needed/not using crutches  Sharp pain  No pain at rest      Narrative & Impression   MRI RIGHT KNEE     INDICATION:   M89.9: Disorder of bone, unspecified  M94.9: Disorder of cartilage, unspecified  S89.91XA: Unspecified injury of right lower leg, initial encounter.     COMPARISON: MRI 12/31/2021. Radiographs 7/5/2024.     TECHNIQUE: Multiplanar/multisequence MR of the right knee was performed.        FINDINGS:     SUBCUTANEOUS TISSUES: Normal     JOINT EFFUSION: Small joint effusion.     BAKER'S CYST: None.     MENISCI: Intact.     CRUCIATE LIGAMENTS: Intact.     EXTENSOR APPARATUS: Intact.     COLLATERAL LIGAMENTS: Intact.     ARTICULAR SURFACES: Marrow edema at the inferior pole of the patella, likely contusion. Osteochondral injury of the medial trochlea with mild subjacent edema measures 1.1 cm in the axial plane (series 2, image 13). 1.7 x 0.3 x 1.1 cm osteochondral lesion   of the medial femoral condyle with undermining fluid signal (series 5, image 21).     BONES: No suspicious osseous lesion.     MUSCULATURE:  Intact.     IMPRESSION:    "  Osteochondral injury of the medial trochlea with mild subjacent edema.     Contusion of the inferior pole of the patella.     Unstable osteochondral defect of the medial aspect of the medial femoral condyle, similar to 12/31/2021.               Review of Systems   Constitutional:  Negative for fatigue and fever.   Respiratory:  Negative for shortness of breath.    Cardiovascular:  Negative for chest pain.   Gastrointestinal:  Negative for abdominal pain and nausea.   Genitourinary:  Negative for dysuria.   Musculoskeletal:  Positive for arthralgias.   Skin:  Negative for rash and wound.   Neurological:  Negative for weakness and headaches.       Objective:  Vitals:  /82 (BP Location: Right arm, Patient Position: Sitting, Cuff Size: Large)   Pulse 98   Temp 99.1 °F (37.3 °C) (Tympanic)   Ht 5' 7.5\" (1.715 m)   Wt 79.4 kg (175 lb)   LMP 06/23/2024 (Exact Date)   BMI 27.00 kg/m²     The following portions of the patient's history were reviewed and updated as appropriate: allergies, current medications, past family history, past medical history, past social history, past surgical history, and problem list.    Physical exam:  Physical Exam  Constitutional:       Appearance: Normal appearance. She is normal weight.   HENT:      Head: Normocephalic.   Eyes:      Extraocular Movements: Extraocular movements intact.   Pulmonary:      Effort: Pulmonary effort is normal.   Musculoskeletal:      Cervical back: Normal range of motion.      Right knee: No effusion.      Instability Tests: Medial Jimenez test negative and lateral Jimenez test negative.   Skin:     General: Skin is warm and dry.   Neurological:      General: No focal deficit present.      Mental Status: She is alert and oriented to person, place, and time. Mental status is at baseline.   Psychiatric:         Mood and Affect: Mood normal.         Behavior: Behavior normal.         Thought Content: Thought content normal.         Judgment: Judgment " normal.       Right Knee Exam     Tenderness   The patient is experiencing no tenderness.     Range of Motion   The patient has normal right knee ROM.    Tests   Jimenez:  Medial - negative Lateral - negative  Varus: negative Valgus: negative    Other   Swelling: none  Effusion: no effusion present          Observations     Right Knee   Negative for effusion.       I have personally reviewed pertinent films in PACS and my interpretation is MRI R knee-  OCD injury medial trochlea, unstable OCD defect medial aspect of medial femoral condyle..      Mario Christopher MD

## 2024-07-30 ENCOUNTER — TELEPHONE (OUTPATIENT)
Age: 24
End: 2024-07-30

## 2024-07-30 NOTE — TELEPHONE ENCOUNTER
Incoming call from patient. New OB patient 5w2d inquiring if she is to continue taking prescribed Adderall.     Routing to on-call provider for review.

## 2024-07-31 NOTE — TELEPHONE ENCOUNTER
Patient called back to check if she can take Adderall, advised patient I would message provider she saw last in office to advise ( Dr. Hensley 6/17/24)   Patient asked if I knew if its safe in pregnancy, she just found out she is pregnant.  Patient told that generally it is not advised to take Adderall however for some patients the benefit outweigh the risks but this would be for her  to determine. Patient verbalized understanding and will wait for provider recommendation.

## 2024-08-01 ENCOUNTER — TELEPHONE (OUTPATIENT)
Dept: OBGYN CLINIC | Facility: CLINIC | Age: 24
End: 2024-08-01

## 2024-08-01 NOTE — TELEPHONE ENCOUNTER
Patient called back, apologized that she was still waiting for provider recommendations.  [facility] sent to Dr. Sapp the on call provider-yes, those are the recommendations. we will discuss it further at her first visit. studies of the use of adderall in pregnant patients is limited. she should continue it for now as the other options have limited data as well.

## 2024-08-01 NOTE — TELEPHONE ENCOUNTER
Phone call back to patient, she has a signed Communication consent and I did confirm it was okay to leave her call back message today.  Reviewed  Recommendation that they will discuss it further at her first visit. Advised patient that studies of the use of adderall in pregnant patients is limited and she should continue it for now as the other options have limited data as well.   Patient provided # to call back any questions.

## 2024-08-02 ENCOUNTER — APPOINTMENT (OUTPATIENT)
Dept: LAB | Facility: CLINIC | Age: 24
End: 2024-08-02
Payer: COMMERCIAL

## 2024-08-02 ENCOUNTER — OFFICE VISIT (OUTPATIENT)
Dept: FAMILY MEDICINE CLINIC | Facility: CLINIC | Age: 24
End: 2024-08-02
Payer: COMMERCIAL

## 2024-08-02 VITALS
SYSTOLIC BLOOD PRESSURE: 118 MMHG | HEIGHT: 68 IN | OXYGEN SATURATION: 97 % | BODY MASS INDEX: 26.25 KG/M2 | HEART RATE: 88 BPM | WEIGHT: 173.2 LBS | TEMPERATURE: 97.4 F | DIASTOLIC BLOOD PRESSURE: 74 MMHG

## 2024-08-02 DIAGNOSIS — Z11.4 ENCOUNTER FOR SCREENING FOR HIV: ICD-10-CM

## 2024-08-02 DIAGNOSIS — Z98.84 HISTORY OF BARIATRIC SURGERY: ICD-10-CM

## 2024-08-02 DIAGNOSIS — K91.2 POSTSURGICAL MALABSORPTION: ICD-10-CM

## 2024-08-02 DIAGNOSIS — E03.9 ACQUIRED HYPOTHYROIDISM: ICD-10-CM

## 2024-08-02 DIAGNOSIS — E53.8 VITAMIN B12 DEFICIENCY: ICD-10-CM

## 2024-08-02 DIAGNOSIS — Z34.90 PREGNANCY, UNSPECIFIED GESTATIONAL AGE: ICD-10-CM

## 2024-08-02 DIAGNOSIS — Z79.899 CONTROLLED SUBSTANCE AGREEMENT SIGNED: ICD-10-CM

## 2024-08-02 DIAGNOSIS — F90.9 ATTENTION DEFICIT HYPERACTIVITY DISORDER (ADHD), UNSPECIFIED ADHD TYPE: Primary | ICD-10-CM

## 2024-08-02 DIAGNOSIS — Z79.899 LONG-TERM CURRENT USE OF STIMULANT: ICD-10-CM

## 2024-08-02 PROBLEM — S89.91XA KNEE INJURY, RIGHT, INITIAL ENCOUNTER: Status: RESOLVED | Noted: 2024-07-05 | Resolved: 2024-08-02

## 2024-08-02 PROBLEM — S83.91XA SPRAIN OF RIGHT KNEE, UNSPECIFIED LIGAMENT, INITIAL ENCOUNTER: Status: RESOLVED | Noted: 2024-07-05 | Resolved: 2024-08-02

## 2024-08-02 LAB
25(OH)D3 SERPL-MCNC: 39.1 NG/ML (ref 30–100)
B-HCG SERPL-ACNC: 4235 MIU/ML (ref 0–5)
FERRITIN SERPL-MCNC: 45 NG/ML (ref 11–307)
HIV 1+2 AB+HIV1 P24 AG SERPL QL IA: NORMAL
HIV 2 AB SERPL QL IA: NORMAL
HIV1 AB SERPL QL IA: NORMAL
HIV1 P24 AG SERPL QL IA: NORMAL
IRON SATN MFR SERPL: 31 % (ref 15–50)
IRON SERPL-MCNC: 124 UG/DL (ref 50–212)
MAGNESIUM SERPL-MCNC: 1.8 MG/DL (ref 1.9–2.7)
PHOSPHATE SERPL-MCNC: 3.8 MG/DL (ref 2.7–4.5)
TIBC SERPL-MCNC: 406 UG/DL (ref 250–450)
TSH SERPL DL<=0.05 MIU/L-ACNC: 2.71 UIU/ML (ref 0.45–4.5)
UIBC SERPL-MCNC: 282 UG/DL (ref 155–355)
VIT B12 SERPL-MCNC: 265 PG/ML (ref 180–914)

## 2024-08-02 PROCEDURE — 36415 COLL VENOUS BLD VENIPUNCTURE: CPT

## 2024-08-02 PROCEDURE — 83735 ASSAY OF MAGNESIUM: CPT

## 2024-08-02 PROCEDURE — 84443 ASSAY THYROID STIM HORMONE: CPT

## 2024-08-02 PROCEDURE — 87389 HIV-1 AG W/HIV-1&-2 AB AG IA: CPT

## 2024-08-02 PROCEDURE — 84100 ASSAY OF PHOSPHORUS: CPT

## 2024-08-02 PROCEDURE — 82607 VITAMIN B-12: CPT

## 2024-08-02 PROCEDURE — 99214 OFFICE O/P EST MOD 30 MIN: CPT | Performed by: FAMILY MEDICINE

## 2024-08-02 PROCEDURE — 83550 IRON BINDING TEST: CPT

## 2024-08-02 PROCEDURE — 82728 ASSAY OF FERRITIN: CPT

## 2024-08-02 PROCEDURE — 83540 ASSAY OF IRON: CPT

## 2024-08-02 PROCEDURE — 82306 VITAMIN D 25 HYDROXY: CPT

## 2024-08-02 PROCEDURE — 84702 CHORIONIC GONADOTROPIN TEST: CPT

## 2024-08-02 NOTE — PROGRESS NOTES
Assessment/Plan:      Diagnoses and all orders for this visit:    Attention deficit hyperactivity disorder (ADHD), unspecified ADHD type    Long-term current use of stimulant    Pregnancy, unspecified gestational age  -     TSH, 3rd generation with Free T4 reflex; Future  -     hCG, quantitative; Future  -     HIV 1/2 AG/AB w Reflex [facility] for 2 yr old and above; Future    Controlled substance agreement signed    Attention deficit hyperactivity disorder (ADHD), predominantly inattentive type    History of bariatric surgery  Comments:  09/2022  Orders:  -     Iron Panel (Includes Ferritin, Iron Sat%, Iron, and TIBC); Future  -     Magnesium; Future  -     Phosphorus; Future  -     Vitamin D 25 hydroxy; Future  -     Vitamin B12; Future    Postsurgical malabsorption  -     Iron Panel (Includes Ferritin, Iron Sat%, Iron, and TIBC); Future  -     Magnesium; Future  -     Phosphorus; Future  -     Vitamin D 25 hydroxy; Future  -     Vitamin B12; Future    Acquired hypothyroidism  -     TSH, 3rd generation with Free T4 reflex; Future    Encounter for screening for HIV  -     HIV 1/2 AG/AB w Reflex [facility] for 2 yr old and above; Future      PDMP reviewed and no red flags; we had long discussion regarding being on Adderall while pregnant, and while literature review shows no studies that indicate risk to developing fetus, the medication Does enter the breast milk, and so pt would have to stop med if she wanted to breastfeed; she had already read up on the topic of being on Adderall while pregnant prior to today's appt and had settled on plan to go off the med sometime before delivery, as she is planning to breastfeed. We discussed timeline of discontinuing the Adderall before beginning of 3rd trimester and remaining off. I will obtain information for pt to sign herself up with [employer] [place] for patients treated with stimulants for ADHD. She is planning to work for as long as possible, and finds it very difficult  to perform her job adequately when her ADHD is uncontrolled  She has her first OB appt 8/29/24  TFT's were normal/controlled in February, but will recheck given new pregnancy     Subjective:  Chief Complaint   Patient presents with   • controlled substance agreement     Found out she is pregnant last week. Wondering if she's ok taking the medication        Patient ID: Allegra Camacho is a 24 y.o. female.    Here for her scheduled controlled substance f/u for adderral rx'd for her ADHD, but also reports today that she found out last week that she is pregnant- home test was positive - has first OB appt 8/29/24   LMP was 6/23/24  Pt will be considering going off the adderall- states with work it's hard to concentrate without the med- she understands that she would not be able to breastfeed due to drug being present in the breast milk  This is her first child        Review of Systems   All other systems reviewed and are negative.        Objective:     Physical Exam  Vitals and nursing note reviewed.   Constitutional:       General: She is not in acute distress.     Appearance: She is well-developed and well-groomed. She is not ill-appearing, toxic-appearing or diaphoretic.   HENT:      Head: Normocephalic and atraumatic.      Mouth/Throat:      Lips: Pink.      Mouth: Mucous membranes are moist.      Pharynx: Oropharynx is clear. Uvula midline.   Neck:      Thyroid: No thyroid mass, thyromegaly or thyroid tenderness.      Trachea: Trachea and phonation normal.   Cardiovascular:      Rate and Rhythm: Normal rate and regular rhythm.      Pulses: Normal pulses.      Heart sounds: Normal heart sounds.   Pulmonary:      Effort: Pulmonary effort is normal.      Breath sounds: Normal breath sounds and air entry.   Abdominal:      General: Bowel sounds are normal.      Palpations: Abdomen is soft.      Tenderness: There is no abdominal tenderness.   Musculoskeletal:      Cervical back: Neck supple.      Right lower leg: No edema.       Left lower leg: No edema.   Lymphadenopathy:      Cervical: No cervical adenopathy.   Skin:     General: Skin is warm and dry.      Coloration: Skin is not pale.   Neurological:      General: No focal deficit present.      Mental Status: She is alert and oriented to person, place, and time.      Gait: Gait normal.   Psychiatric:         Attention and Perception: Attention normal.         Mood and Affect: Mood normal.         Speech: Speech normal.         Behavior: Behavior normal. Behavior is cooperative.         Thought Content: Thought content normal.         Cognition and Memory: Cognition and memory normal.         Judgment: Judgment normal.

## 2024-08-04 DIAGNOSIS — K91.2 POSTSURGICAL MALABSORPTION: ICD-10-CM

## 2024-08-04 DIAGNOSIS — E83.42 HYPOMAGNESEMIA: Primary | ICD-10-CM

## 2024-08-04 DIAGNOSIS — Z98.84 HISTORY OF BARIATRIC SURGERY: ICD-10-CM

## 2024-08-04 DIAGNOSIS — Z34.90 PREGNANCY, UNSPECIFIED GESTATIONAL AGE: ICD-10-CM

## 2024-08-04 RX ORDER — UREA 10 %
500 LOTION (ML) TOPICAL 2 TIMES DAILY
Qty: 60 TABLET | Refills: 0 | Status: SHIPPED | OUTPATIENT
Start: 2024-08-04

## 2024-08-05 ENCOUNTER — TELEPHONE (OUTPATIENT)
Dept: BARIATRICS | Facility: CLINIC | Age: 24
End: 2024-08-05

## 2024-08-05 DIAGNOSIS — K91.2 POSTSURGICAL MALABSORPTION: Primary | ICD-10-CM

## 2024-08-05 DIAGNOSIS — R79.89 LOW VITAMIN B12 LEVEL: ICD-10-CM

## 2024-08-05 NOTE — RESULT ENCOUNTER NOTE
Please let Allegra know that her B12 levels are only trending up slightly - we need to do more injections at least monthly and how much extra sublingual B12 is she taking? I think she needs at least 3,000mcg sublingual daily - repeat labs in 3 months.    Thank you

## 2024-08-05 NOTE — TELEPHONE ENCOUNTER
----- Message from Scarlet Gray PA-C sent at 8/5/2024  7:41 AM EDT -----  Please let Allegra know that her B12 levels are only trending up slightly - we need to do more injections at least monthly and how much extra sublingual B12 is she taking? I think she needs at least 3,000mcg sublingual daily - repeat labs in 3 months.    Thank you

## 2024-08-06 ENCOUNTER — TELEPHONE (OUTPATIENT)
Dept: FAMILY MEDICINE CLINIC | Facility: CLINIC | Age: 24
End: 2024-08-06

## 2024-08-06 NOTE — TELEPHONE ENCOUNTER
Please let pt know that the National Pregnancy Registry information that I mentioned at visit in regards to her being on Adderall is as follows for her to sign up:    Call toll-free [phone], or  Email: [email or web address], or   sign up online at [email or web address].     (This is the Reproductive Psychiatry Resource and Information Center Registry run by the [employer] and [facility])

## 2024-08-08 ENCOUNTER — TELEPHONE (OUTPATIENT)
Age: 24
End: 2024-08-08

## 2024-08-08 ENCOUNTER — TELEPHONE (OUTPATIENT)
Dept: OBGYN CLINIC | Facility: CLINIC | Age: 24
End: 2024-08-08

## 2024-08-08 NOTE — TELEPHONE ENCOUNTER
Caller: Allegra     Doctor/Office: Ortho    Call regarding :  Scheduling appt      Call was transferred to: Warm transfer to Ortho

## 2024-08-12 ENCOUNTER — NURSE TRIAGE (OUTPATIENT)
Age: 24
End: 2024-08-12

## 2024-08-12 NOTE — TELEPHONE ENCOUNTER
"Patient calling in to report one episode vaginal spotting over the weekend. Patient states upon wiping after using the bathroom, she noticed a small blood clot smaller than the size of a maurice. Denies abdominal pain or cramping. Denies current vaginal bleeding.  RN advised patient continue monitoring for now and call office back with return of vaginal bleeding or with severe abdominal pain/cramping. Patient verbalized understanding.      Reason for Disposition   SPOTTING (single or brief episode)    Answer Assessment - Initial Assessment Questions  1. ONSET: \"When did this bleeding start?\"        Over the weekend  2. DESCRIPTION: \"Describe the bleeding that you are having.\" \"How much bleeding is there?\"     - SPOTTING: spotting, or pinkish / brownish mucous discharge; does not fill panti-liner or pad     - MILD:  less than 1 pad / hour; less than patient's usual menstrual bleeding    - MODERATE: 1-2 pads / hour; 1 menstrual cup every 6 hours; small-medium blood clots (e.g., pea, grape, small coin)    - SEVERE: soaking 2 or more pads/hour for 2 or more hours; 1 menstrual cup every 2 hours; bleeding not contained by pads or continuous red blood from vagina; large blood clots (e.g., golf ball, large coin)       Small blood clot, bright red spotting after wiping. Smaller than a maurice.  3. ABDOMINAL PAIN SEVERITY: If present, ask: \"How bad is it?\"  (e.g., Scale 1-10; mild, moderate, or severe)    - MILD (1-3): doesn't interfere with normal activities, abdomen soft and not tender to touch     - MODERATE (4-7): interferes with normal activities or awakens from sleep, tender to touch     - SEVERE (8-10): excruciating pain, doubled over, unable to do any normal activities      Denies  4. PREGNANCY: \"Do you know how many weeks or months pregnant you are?\" \"When was the first day of your last normal menstrual period?\"      LMP 6/23/24  5. HEMODYNAMIC STATUS: \"Are you weak or feeling lightheaded?\" If Yes, ask: \"Can you stand " "and walk normally?\"       At times dizziness upon standing up too quickly  6. OTHER SYMPTOMS: \"What other symptoms are you having with the bleeding?\" (e.g., passed tissue, vaginal discharge, fever, menstrual-type cramps)      Cramping related to constipation.    Protocols used: Pregnancy - Vaginal Bleeding Less Than 20 Weeks EGA-ADULT-[place]    "

## 2024-08-12 NOTE — TELEPHONE ENCOUNTER
Regarding: OB 7 WEEKS Spotting  ----- Message from Yelena MCDONALD sent at 8/12/2024  8:25 AM EDT -----  Attempted warm transfer.      7w 1d and was having spotting this weekend.

## 2024-08-29 ENCOUNTER — ULTRASOUND (OUTPATIENT)
Dept: OBGYN CLINIC | Facility: CLINIC | Age: 24
End: 2024-08-29
Payer: COMMERCIAL

## 2024-08-29 VITALS
BODY MASS INDEX: 27.16 KG/M2 | SYSTOLIC BLOOD PRESSURE: 116 MMHG | WEIGHT: 179.2 LBS | DIASTOLIC BLOOD PRESSURE: 68 MMHG | HEIGHT: 68 IN

## 2024-08-29 DIAGNOSIS — O02.1 MISSED ABORTION: Primary | ICD-10-CM

## 2024-08-29 PROCEDURE — 76817 TRANSVAGINAL US OBSTETRIC: CPT | Performed by: OBSTETRICS & GYNECOLOGY

## 2024-08-29 PROCEDURE — 99214 OFFICE O/P EST MOD 30 MIN: CPT | Performed by: OBSTETRICS & GYNECOLOGY

## 2024-08-29 NOTE — PROGRESS NOTES
"Pregnancy Confirmation Visit  [facility] [address] Maegan Hughes PA    Assessment/Plan:  24 y.o.  presenting with missed menses.  Nonviable pregnancy 9w1d consistent with early pregnancy loss  -options reviewed with patient and fiance. Will consider options and call with decision    Subjective:    CC: Missed period    Allegra Camacho is a 24 y.o.  who presents with missed menses.  LMP Patient's last menstrual period was 2024 (exact date)..      Patient notes that this pregnancy was unplanned yet desired.  She was not using contraception at the time of conception. She reports she is certain of her LMP and that she has regular menses. She has had spotting recently    Objective:  /68   Ht 5' 7.5\" (1.715 m)   Wt 81.3 kg (179 lb 3.2 oz)   LMP 2024 (Exact Date)   BMI 27.65 kg/m²     Physical Exam:  General: Well appearing, no distress  CV: Regular rate  Respiratory: Unlabored breathing  Abdomen: Soft, nontender  Extremities: Without edema  Mood and Affect: Appropriate    Transvaginal Pelvic Ultrasound  [place] IUP  Yolk sac: Present  Fetal Pole: Present  CRL consistent with EGA 9w1d  Cardiac activity: Present  No fetal cardiac activity  No adnexal masses appreciated  "

## 2024-09-04 ENCOUNTER — TELEPHONE (OUTPATIENT)
Dept: OBGYN CLINIC | Facility: CLINIC | Age: 24
End: 2024-09-04

## 2024-09-04 ENCOUNTER — OFFICE VISIT (OUTPATIENT)
Dept: OBGYN CLINIC | Facility: CLINIC | Age: 24
End: 2024-09-04
Payer: COMMERCIAL

## 2024-09-04 VITALS
SYSTOLIC BLOOD PRESSURE: 116 MMHG | BODY MASS INDEX: 27.43 KG/M2 | WEIGHT: 181 LBS | DIASTOLIC BLOOD PRESSURE: 72 MMHG | HEIGHT: 68 IN

## 2024-09-04 DIAGNOSIS — O02.1 MISSED AB: Primary | ICD-10-CM

## 2024-09-04 DIAGNOSIS — F90.0 ATTENTION DEFICIT HYPERACTIVITY DISORDER (ADHD), PREDOMINANTLY INATTENTIVE TYPE: ICD-10-CM

## 2024-09-04 PROCEDURE — 76817 TRANSVAGINAL US OBSTETRIC: CPT | Performed by: OBSTETRICS & GYNECOLOGY

## 2024-09-04 PROCEDURE — 99213 OFFICE O/P EST LOW 20 MIN: CPT | Performed by: OBSTETRICS & GYNECOLOGY

## 2024-09-04 NOTE — LETTER
September 4, 2024     Patient: Allegra Camacho  YOB: 2000  Date of Visit: 9/4/2024      To Whom it May Concern:    Allegra Camacho is under my professional care. Allegra was seen in my office on 9/4/2024 for an acute concern. She will require surgical intervention that is currently undergoing scheduling, but is anticipated to occur within the next week. We will update you promptly once further information is available; please take today's appt into account with regards to her absence from work.     If you have any questions or concerns, please don't hesitate to call.         Sincerely,          Sumi Lundberg MD

## 2024-09-04 NOTE — TELEPHONE ENCOUNTER
----- Message from Sumi Lundberg MD sent at 2024  2:06 PM EDT -----  Regarding: schedule surgery  [facility] GYN Department  Surgery Scheduling Sheet    Patient Name: Allegra Camacho  : 2000    Provider: Sumi Lundberg MD     Needed: no; Language: N/A    Procedure: exam under anesthesia and dilation and evacuation    Diagnosis: missed ab (9w)    Special Needs or Equipment: D&E machine, 8-9F cannula    Anesthesia: choice    Length of stay: outpatient  Does patient have comorbid conditions that will require close perioperative monitoring prior to safe discharge: no    Pre-Admission Testing Needed: no     Order PAT that is recommended in prep for procedure?: Not Indicated    Medical Clearance Needed: no; Provider: N/A    MA Form Signed (tubals/hysterectomy): POC dispo signed    Surgical Drink Given: no     How many days out of work: 1-4 day(s)     How many days no drivinhr       Is pre op appt needed?  no  Interval for post op appt: 2 week(s)     For Surgical Scheduler:     Surgery Scheduled On:  [place]    I have cases at [place] next Thurs -- any chance we can add on?    Pre-op Appt:   Post op Appt:  Consult/Medical clearance appt:

## 2024-09-04 NOTE — PROGRESS NOTES
History & Physical - OB/GYN   Allegra Camacho 24 y.o. female MRN: [id number]  Unit/Bed#:  Encounter: [id number]      Chief complaint:  Discuss options    Subjective   Allegra Camacho is a 24 y.o.  who presents to discuss mgmt options for missed Ab.    9wk US without FHT noted on 24.     Patient reports feels well. No bleeding or significant cramping since last visit. Presents to review options, considering surgery but unsure.    Discussed with patient that based on her evaluation and clinical course, it has been determined that her pregnancy is non-viable. Discussed options for management, including expectant vs medical vs surgical approaches. Discussed that the majority of miscarriages will pass spontaneously if given time. Discussed medical management with Cytotec will likely decrease the time from now to passage of the pregnancy. Discussed possibility of retained products with either of these approaches, which can be managed either medically or surgically. Discussed surgical management via suction dilation and curettage (OR) vs MVA (office), which is the more definitive approach but is associated with risks of surgical instrumentation of the uterus.    Patient had the opportunity to ask questions, which were answered to their satisfaction. She has elected to proceed with suction D&C in OR.      Active Problems:  Patient Active Problem List   Diagnosis    ADHD (attention deficit hyperactivity disorder)    BMI 27.0-27.9,adult    Acquired hypothyroidism    BRUCE (obstructive sleep apnea)    Encounter for surgical aftercare following surgery of digestive system    Postsurgical malabsorption    Long-term current use of stimulant    History of bariatric surgery    Controlled substance agreement signed    Osteochondral lesion       PMH:  Past Medical History:   Diagnosis Date    ADHD (attention deficit hyperactivity disorder)     Hypothyroid     Morbid obesity with BMI of 45.0-49.9, adult ([facility])     Preop  "cardiovascular exam 2016    Last Assessment & Plan:   Contraceptive options were reviewed including hormonal methods, both combination (pill, patch, vaginal ring) and progesterone-only (pill, Depo Provera, Implanon), intrauterine devices (Mirena, Paragard), barrier methods (condoms, diaphragm), and male and female sterilization.  The mechanism, risks, benefits, and side effects of all methods were discussed.  Doing well on O    Stool color abnormal 2023    pt reports had \"black\" stool episode last night- she is on iron supplement - will monitor and call if has another episode       PSH:  Past Surgical History:   Procedure Laterality Date    EGD      FRACTURE SURGERY      [place] LAPS GSTRC RSTRICTIV PX LONGITUDINAL GASTRECTOMY N/A 2022    Procedure: GASTRECTOMY SLEEVE LAPAROSCOPIC AND INTAOPERATIVE EGD;  Surgeon: Arben Villatoro MD;  Location: [facility] OR;  Service: Bariatrics    WISDOM TOOTH EXTRACTION      WRIST SURGERY Right     secondary to a fracture       Social Hx:  Social History     Tobacco Use    Smoking status: Every Day    Smokeless tobacco: Former   Vaping Use    Vaping status: Former    Quit date: 2022    Substances: Nicotine, Flavoring   Substance Use Topics    Alcohol use: Yes     Comment: sociably    Drug use: Never       OB Hx:  OB History    Para Term  AB Living   1 0 0 0 0 0   SAB IAB Ectopic Multiple Live Births   0 0 0 0 0      # Outcome Date GA Lbr Rocael/2nd Weight Sex Type Anes PTL Lv   1 Current                Meds:  Current Outpatient Medications on File Prior to Visit   Medication Sig Dispense Refill    amphetamine-dextroamphetamine (ADDERALL XR) 30 MG 24 hr capsule Take 1 capsule (30 mg total) by mouth every morning Max Daily Amount: 30 mg Do not start before 2024. 30 capsule 0    FIBER ADULT GUMMIES PO Take by mouth      hydrOXYzine HCL (ATARAX) 25 mg tablet Take 1 tablet (25 mg total) by mouth every 6 (six) hours as needed for anxiety 25 tablet 0 " "   levothyroxine 75 mcg tablet take 1 tablet by mouth once daily 90 tablet 1    magnesium gluconate (MAGONATE) 500 mg tablet Take 1 tablet (500 mg total) by mouth 2 (two) times a day 60 tablet 0    Prenatal Vit-Fe Fumarate-FA (PRENATAL VITAMIN PO) Take by mouth       No current facility-administered medications on file prior to visit.       Allergies:  No Known Allergies      Physical Exam:  /72   Ht 5' 7.5\" (1.715 m)   Wt 82.1 kg (181 lb)   LMP 06/23/2024 (Exact Date)   BMI 27.93 kg/m²     Physical Exam  Vitals reviewed. Exam conducted with a chaperone present.   Constitutional:       General: She is not in acute distress.     Appearance: Normal appearance. She is not ill-appearing, toxic-appearing or diaphoretic.   Eyes:      General: No scleral icterus.        Right eye: No discharge.         Left eye: No discharge.      Conjunctiva/sclera: Conjunctivae normal.   Cardiovascular:      Rate and Rhythm: Normal rate.      Heart sounds: Normal heart sounds. No murmur heard.     No gallop.   Pulmonary:      Effort: Pulmonary effort is normal. No respiratory distress.      Breath sounds: Normal breath sounds. No wheezing or rales.   Abdominal:      General: There is no distension.      Palpations: There is no mass.      Tenderness: There is no abdominal tenderness. There is no guarding or rebound.      Hernia: No hernia is present.   Genitourinary:     General: Normal vulva.      Exam position: Lithotomy position.      Labia:         Right: No rash, tenderness or lesion.         Left: No rash, tenderness or lesion.       Urethra: No prolapse, urethral swelling or urethral lesion.      Vagina: No bleeding.   Musculoskeletal:         General: No swelling.   Skin:     General: Skin is warm and dry.      Coloration: Skin is not jaundiced or pale.      Findings: No bruising or erythema.   Neurological:      Mental Status: She is alert.   Psychiatric:         Mood and Affect: Mood normal.         Behavior: Behavior " normal.         Thought Content: Thought content normal.         Judgment: Judgment normal.           Assessment:   24 y.o.  presenting for repeat US and to discuss options for mgmt of missed Ab. Desires to proceed with suction D&C in OR.    Plan:   1. Task sent for surgical scheduling  2. NPO midnight preop      Lab Review  Pelvic US 24

## 2024-09-05 ENCOUNTER — DOCUMENTATION (OUTPATIENT)
Dept: OBGYN CLINIC | Facility: CLINIC | Age: 24
End: 2024-09-05

## 2024-09-05 RX ORDER — DEXTROAMPHETAMINE SACCHARATE, AMPHETAMINE ASPARTATE MONOHYDRATE, DEXTROAMPHETAMINE SULFATE AND AMPHETAMINE SULFATE 7.5; 7.5; 7.5; 7.5 MG/1; MG/1; MG/1; MG/1
30 CAPSULE, EXTENDED RELEASE ORAL EVERY MORNING
Qty: 30 CAPSULE | Refills: 0 | Status: SHIPPED | OUTPATIENT
Start: 2024-09-05

## 2024-09-09 ENCOUNTER — HOSPITAL ENCOUNTER (EMERGENCY)
Facility: HOSPITAL | Age: 24
Discharge: HOME | End: 2024-09-09
Attending: EMERGENCY MEDICINE
Payer: COMMERCIAL

## 2024-09-09 ENCOUNTER — NURSE TRIAGE (OUTPATIENT)
Age: 24
End: 2024-09-09

## 2024-09-09 ENCOUNTER — APPOINTMENT (EMERGENCY)
Dept: ULTRASOUND IMAGING | Facility: HOSPITAL | Age: 24
End: 2024-09-09
Payer: COMMERCIAL

## 2024-09-09 VITALS
DIASTOLIC BLOOD PRESSURE: 63 MMHG | HEART RATE: 75 BPM | RESPIRATION RATE: 16 BRPM | WEIGHT: 180.78 LBS | BODY MASS INDEX: 27.9 KG/M2 | SYSTOLIC BLOOD PRESSURE: 133 MMHG | TEMPERATURE: 97.9 F | OXYGEN SATURATION: 100 %

## 2024-09-09 DIAGNOSIS — O02.1 MISSED ABORTION: Primary | ICD-10-CM

## 2024-09-09 DIAGNOSIS — O03.9 SAB (SPONTANEOUS ABORTION): Primary | ICD-10-CM

## 2024-09-09 LAB
ABO GROUP BLD: NORMAL
ANION GAP SERPL CALCULATED.3IONS-SCNC: 9 MMOL/L (ref 4–13)
B-HCG SERPL-ACNC: 7541.4 MIU/ML (ref 0–5)
BASOPHILS # BLD AUTO: 0.08 THOUSANDS/ΜL (ref 0–0.1)
BASOPHILS NFR BLD AUTO: 1 % (ref 0–1)
BLD GP AB SCN SERPL QL: NEGATIVE
BUN SERPL-MCNC: 11 MG/DL (ref 5–25)
CALCIUM SERPL-MCNC: 8.9 MG/DL (ref 8.4–10.2)
CHLORIDE SERPL-SCNC: 107 MMOL/L (ref 96–108)
CO2 SERPL-SCNC: 22 MMOL/L (ref 21–32)
CREAT SERPL-MCNC: 0.59 MG/DL (ref 0.6–1.3)
EOSINOPHIL # BLD AUTO: 0.24 THOUSAND/ΜL (ref 0–0.61)
EOSINOPHIL NFR BLD AUTO: 3 % (ref 0–6)
ERYTHROCYTE [DISTWIDTH] IN BLOOD BY AUTOMATED COUNT: 12 % (ref 11.6–15.1)
GFR SERPL CREATININE-BSD FRML MDRD: 127 ML/MIN/1.73SQ M
GLUCOSE SERPL-MCNC: 91 MG/DL (ref 65–140)
HCT VFR BLD AUTO: 41 % (ref 34.8–46.1)
HGB BLD-MCNC: 13.4 G/DL (ref 11.5–15.4)
IMM GRANULOCYTES # BLD AUTO: 0.04 THOUSAND/UL (ref 0–0.2)
IMM GRANULOCYTES NFR BLD AUTO: 1 % (ref 0–2)
LYMPHOCYTES # BLD AUTO: 1.72 THOUSANDS/ΜL (ref 0.6–4.47)
LYMPHOCYTES NFR BLD AUTO: 20 % (ref 14–44)
MCH RBC QN AUTO: 30.3 PG (ref 26.8–34.3)
MCHC RBC AUTO-ENTMCNC: 32.7 G/DL (ref 31.4–37.4)
MCV RBC AUTO: 93 FL (ref 82–98)
MONOCYTES # BLD AUTO: 0.53 THOUSAND/ΜL (ref 0.17–1.22)
MONOCYTES NFR BLD AUTO: 6 % (ref 4–12)
NEUTROPHILS # BLD AUTO: 5.87 THOUSANDS/ΜL (ref 1.85–7.62)
NEUTS SEG NFR BLD AUTO: 69 % (ref 43–75)
NRBC BLD AUTO-RTO: 0 /100 WBCS
PLATELET # BLD AUTO: 282 THOUSANDS/UL (ref 149–390)
PMV BLD AUTO: 9.7 FL (ref 8.9–12.7)
POTASSIUM SERPL-SCNC: 4.1 MMOL/L (ref 3.5–5.3)
RBC # BLD AUTO: 4.42 MILLION/UL (ref 3.81–5.12)
RH BLD: POSITIVE
SODIUM SERPL-SCNC: 138 MMOL/L (ref 135–147)
SPECIMEN EXPIRATION DATE: NORMAL
WBC # BLD AUTO: 8.48 THOUSAND/UL (ref 4.31–10.16)

## 2024-09-09 PROCEDURE — 86850 RBC ANTIBODY SCREEN: CPT | Performed by: EMERGENCY MEDICINE

## 2024-09-09 PROCEDURE — 99215 OFFICE O/P EST HI 40 MIN: CPT | Performed by: OBSTETRICS & GYNECOLOGY

## 2024-09-09 PROCEDURE — 85025 COMPLETE CBC W/AUTO DIFF WBC: CPT | Performed by: EMERGENCY MEDICINE

## 2024-09-09 PROCEDURE — 96374 THER/PROPH/DIAG INJ IV PUSH: CPT

## 2024-09-09 PROCEDURE — 76815 OB US LIMITED FETUS(S): CPT

## 2024-09-09 PROCEDURE — 86901 BLOOD TYPING SEROLOGIC RH(D): CPT | Performed by: EMERGENCY MEDICINE

## 2024-09-09 PROCEDURE — 36415 COLL VENOUS BLD VENIPUNCTURE: CPT | Performed by: EMERGENCY MEDICINE

## 2024-09-09 PROCEDURE — 99285 EMERGENCY DEPT VISIT HI MDM: CPT | Performed by: EMERGENCY MEDICINE

## 2024-09-09 PROCEDURE — 86900 BLOOD TYPING SEROLOGIC ABO: CPT | Performed by: EMERGENCY MEDICINE

## 2024-09-09 PROCEDURE — 99284 EMERGENCY DEPT VISIT MOD MDM: CPT

## 2024-09-09 PROCEDURE — 84702 CHORIONIC GONADOTROPIN TEST: CPT

## 2024-09-09 PROCEDURE — 80048 BASIC METABOLIC PNL TOTAL CA: CPT | Performed by: EMERGENCY MEDICINE

## 2024-09-09 RX ORDER — LORAZEPAM 2 MG/ML
0.5 INJECTION INTRAMUSCULAR ONCE
Status: COMPLETED | OUTPATIENT
Start: 2024-09-09 | End: 2024-09-09

## 2024-09-09 RX ADMIN — LORAZEPAM 0.5 MG: 2 INJECTION INTRAMUSCULAR; INTRAVENOUS at 18:40

## 2024-09-09 NOTE — TELEPHONE ENCOUNTER
"Patient calling in stating she's having a D&C on 9/12/24 for a missed AB on 9/4/24. Pt reporting that her cramping is worsening and is 8/10. Pt reporting taking tylenol but it isnt helping. Pt reporting having vaginal bleeding when wiping, brown in color. Pt reporting feeling lightheaded.     Patient is advised to go to the emergency room and to call 911, pt refuses to call 911 at this time, pt reporting that her fiance just got home, and she will have him drive her to the hospital. Patient stated she will go to [facility] ED, pt is advised that OBGYN care isn't in house there, and that she may be transferred, pt stated she will be going to [facility], pt is advised to leave now and go to the nearest emergency room for further assistance, Pt verbalized understanding, no further questions at this time    Epic Secure Chat sent to Dr. Michael          Reason for Disposition   Sounds like a life-threatening emergency to the triager    Answer Assessment - Initial Assessment Questions  1. LOCATION: \"Where does it hurt?\"       Pt reporting lower abdominal pain   2. RADIATION: \"Does the pain shoot anywhere else?\" (e.g., chest, back, shoulder)      Pt denies   3. ONSET: \"When did the pain begin?\" (e.g., minutes, hours or days ago)       Pt reporting an hour ago   4. ONSET: \"Gradual or sudden onset?\"      Gradually   5. PATTERN \"Does the pain come and go, or has it been constant since it started?\"       Constant   6. SEVERITY: \"How bad is the pain?\" \"What does it keep you from doing?\"  (e.g., Scale 1-10; mild, moderate, or severe)    - MILD (1-3): doesn't interfere with normal activities, abdomen soft and not tender to touch     - MODERATE (4-7): interferes with normal activities or awakens from sleep, tender to touch     - SEVERE (8-10): excruciating pain, doubled over, unable to do any normal activities      8/10   7. RECURRENT SYMPTOM: \"Have you ever had this type of stomach pain before?\" If Yes, ask: " "\"When was the last time?\" and \"What happened that time?\"       Pt denies   8. CAUSE: \"What do you think is causing the stomach pain?\"      Miscarriage   9. RELIEVING/AGGRAVATING FACTORS: \"What makes it better or worse?\" (e.g., antacids, bowel movement, movement)      Pt reporting that nothing is helping the pain   10. OTHER SYMPTOMS: \"Has there been any vaginal bleeding, fever, vomiting, diarrhea, or urine problems?\"        Pt c/o vaginal bleeding. Pt denies other symptoms   11. CHAO: \"What date are you expecting to deliver?\"        N/a    Protocols used: Pregnancy - Abdominal Pain Less Than 20 Weeks EGA-ADULT-[place]    "

## 2024-09-09 NOTE — LETTER
[facility] EMERGENCY DEPARTMENT  [address] [address]  Dept: [phone]    September 9, 2024     Patient: Allegra Camacho   YOB: 2000   Date of Visit: 9/9/2024       To Whom it May Concern:    Allegra Camacho is under my professional care. She was seen in emergency department under my care on 9/9/24 and will require 1wk of leave (9/9/24 - 9/15/24) for appropriate follow up and recovery of this emergent concern. She is expected to be able to return to work 9/16/24 without restriction. Should this recommendation change, we will update you promptly.    If you have any questions or concerns, please don't hesitate to call.         Sincerely,          Sumi Lundberg MD

## 2024-09-09 NOTE — Clinical Note
Allegra Camacho was seen and treated in our emergency department on 9/9/2024.                Diagnosis:     Allegra  may return to work on return date.    She may return on this date: 09/16/2024         If you have any questions or concerns, please don't hesitate to call.      Briseida Ferraro RN    ______________________________           _______________          _______________  Hospital Representative                              Date                                Time

## 2024-09-09 NOTE — ED PROVIDER NOTES
History  Chief Complaint   Patient presents with    Vaginal Bleeding     Worsening vaginal bleeding and abdominal pain. Has a D&C scheduled on . Changing pad q1 hour.      A 24-year-old female who is  with previously diagnosed missed  at 9w1d (scheduled for a D&C on ); presents with vaginal bleeding.  Patient states she first started bleeding three days ago however it became much heavier today.  Patient states she is saturating more than one feminine pad per hour, along with the passage of large clots.  She also reports associated abdominal cramping.  Patient otherwise denies fever, chills, dizziness, lightheadedness, chest pain, shortness of breath, nausea, vomiting, diarrhea, peripheral edema and rashes.      History provided by:  Patient and medical records    Prior to Admission Medications   Prescriptions Last Dose Informant Patient Reported? Taking?   FIBER ADULT GUMMIES PO   Yes No   Sig: Take by mouth   Prenatal Vit-Fe Fumarate-FA (PRENATAL VITAMIN PO)   Yes No   Sig: Take by mouth   amphetamine-dextroamphetamine (ADDERALL XR) 30 MG 24 hr capsule   No No   Sig: Take 1 capsule (30 mg total) by mouth every morning Max Daily Amount: 30 mg   hydrOXYzine HCL (ATARAX) 25 mg tablet  Self No No   Sig: Take 1 tablet (25 mg total) by mouth every 6 (six) hours as needed for anxiety   levothyroxine 75 mcg tablet  Self No No   Sig: take 1 tablet by mouth once daily   magnesium gluconate (MAGONATE) 500 mg tablet   No No   Sig: Take 1 tablet (500 mg total) by mouth 2 (two) times a day      Facility-Administered Medications: None       Past Medical History:   Diagnosis Date    ADHD (attention deficit hyperactivity disorder)     Hypothyroid     Morbid obesity with BMI of 45.0-49.9, adult ([facility])     Preop cardiovascular exam 2016    Last Assessment & Plan:   Contraceptive options were reviewed including hormonal methods, both combination (pill, patch, vaginal ring) and progesterone-only (pill, Depo  "Provera, Implanon), intrauterine devices (Mirena, Paragard), barrier methods (condoms, diaphragm), and male and female sterilization.  The mechanism, risks, benefits, and side effects of all methods were discussed.  Doing well on O    Stool color abnormal 11/23/2023    pt reports had \"black\" stool episode last night- she is on iron supplement - will monitor and call if has another episode       Past Surgical History:   Procedure Laterality Date    EGD      FRACTURE SURGERY      OR LAPS GSTRC RSTRICTIV PX LONGITUDINAL GASTRECTOMY N/A 09/12/2022    Procedure: GASTRECTOMY SLEEVE LAPAROSCOPIC AND INTAOPERATIVE EGD;  Surgeon: Arben Villatoro MD;  Location: [facility];  Service: Bariatrics    WISDOM TOOTH EXTRACTION      WRIST SURGERY Right     secondary to a fracture       Family History   Problem Relation Age of Onset    Heart disease Paternal Grandfather     Diabetes Paternal Grandfather     Lung cancer Paternal Grandfather     Diabetes Sister         prediabetes    Thyroid disease Paternal Grandmother     ADD / ADHD Brother     Stroke Neg Hx      I have reviewed and agree with the history as documented.    E-Cigarette/Vaping    E-Cigarette Use Former User     Quit Date 12/1/22      E-Cigarette/Vaping Substances    Nicotine Yes     THC No     CBD No     Flavoring Yes     Other No     Unknown No      Social History     Tobacco Use    Smoking status: Every Day    Smokeless tobacco: Former   Vaping Use    Vaping status: Former    Quit date: 12/1/2022    Substances: Nicotine, Flavoring   Substance Use Topics    Alcohol use: Yes     Comment: sociably    Drug use: Never       Review of Systems   Genitourinary:  Positive for pelvic pain and vaginal bleeding.   All other systems reviewed and are negative.      Physical Exam  Physical Exam  General Appearance: alert and oriented, nad, non toxic appearing  Skin:  Warm, dry, intact.  No cyanosis  HEENT: Atraumatic, normocephalic.  No eye drainage.  Normal hearing.  Moist mucous " membranes.    Neck: Supple, trachea midline  Cardiac: RRR; no murmurs, rub, gallops.  No pedal edema, 2+ pulses  Pulmonary: lungs CTAB; no wheezes, rales, rhonchi  Gastrointestinal: abdomen soft, nontender, nondistended; no guarding or rebound tenderness; good bowel sounds, no mass or bruits  Genitourinary: Pelvic exam chaperoned by LEISA Estrella.  Large amount of blood noted in the vaginal vault, large clot removed.  Extremities:  No deformities.  No calf tenderness, no clubbing  Neuro:  no focal motor or sensory deficits, CN 2-12 grossly intact  Psych:  Normal mood and affect, normal judgement and insight      Vital Signs  ED Triage Vitals   Temperature Pulse Respirations Blood Pressure SpO2   09/09/24 1750 09/09/24 1750 09/09/24 1750 09/09/24 1750 09/09/24 1750   97.9 °F (36.6 °C) 60 18 170/76 100 %      Temp src Heart Rate Source Patient Position - Orthostatic VS BP Location FiO2 (%)   -- 09/09/24 1859 09/09/24 1859 09/09/24 1859 --    Monitor Lying Right arm       Pain Score       --                  Vitals:    09/09/24 1750 09/09/24 1859 09/09/24 1930 09/09/24 2207   BP: 170/76 129/66 123/58 133/63   Pulse: 60 70 65 75   Patient Position - Orthostatic VS:  Lying Lying Lying         Visual Acuity      ED Medications  Medications   LORazepam (ATIVAN) injection 0.5 mg (0.5 mg Intravenous Given 9/9/24 1840)       Diagnostic Studies  Results Reviewed       Procedure Component Value Units Date/Time    hCG, quantitative [[id number]]  (Abnormal) Collected: 09/09/24 1855    Lab Status: Final result Specimen: Blood from Arm, Right Updated: 09/09/24 2231     HCG, Quant 7,541.4 mIU/mL     Narrative:       Expected Ranges:    HCG results between 5.0 and 25.0 mIU/mL may be indicative of early pregnancy but should be interpreted in light of the total clinical presentation.    HCG can rise to detectable levels in antoinette and post menopausal women (0-11.6 mIU/mL).     Approximate               Approximate HCG  Gestation age           Concentration ( mIU/mL)  _____________          ______________________   Weeks                      HCG values  0.2-1                       5-50  1-2                           2-3                         100-5000  3-4                         500-21413  4-5                         1000-13833  5-6                         [id number]  6-8                         [id number]  8-12                        [id number]      Basic metabolic panel [[id number]]  (Abnormal) Collected: 09/09/24 1855    Lab Status: Final result Specimen: Blood from Arm, Right Updated: 09/09/24 1915     Sodium 138 mmol/L      Potassium 4.1 mmol/L      Chloride 107 mmol/L      CO2 22 mmol/L      ANION GAP 9 mmol/L      BUN 11 mg/dL      Creatinine 0.59 mg/dL      Glucose 91 mg/dL      Calcium 8.9 mg/dL      eGFR 127 ml/min/1.73sq m     Narrative:      National Kidney Disease Foundation guidelines for Chronic Kidney Disease (CKD):     Stage 1 with normal or high GFR (GFR > 90 mL/min/1.73 square meters)    Stage 2 Mild CKD (GFR = 60-89 mL/min/1.73 square meters)    Stage 3A Moderate CKD (GFR = 45-59 mL/min/1.73 square meters)    Stage 3B Moderate CKD (GFR = 30-44 mL/min/1.73 square meters)    Stage 4 Severe CKD (GFR = 15-29 mL/min/1.73 square meters)    Stage 5 End Stage CKD (GFR <15 mL/min/1.73 square meters)  Note: GFR calculation is accurate only with a steady state creatinine    CBC and differential [[id number]] Collected: 09/09/24 1855    Lab Status: Final result Specimen: Blood from Arm, Right Updated: 09/09/24 1901     WBC 8.48 Thousand/uL      RBC 4.42 Million/uL      Hemoglobin 13.4 g/dL      Hematocrit 41.0 %      MCV 93 fL      MCH 30.3 pg      MCHC 32.7 g/dL      RDW 12.0 %      MPV 9.7 fL      Platelets 282 Thousands/uL      nRBC 0 /100 WBCs      Segmented % 69 %      Immature Grans % 1 %      Lymphocytes % 20 %      Monocytes % 6 %      Eosinophils Relative 3 %      Basophils Relative 1 %      Absolute Neutrophils 5.87  Thousands/µL      Absolute Immature Grans 0.04 Thousand/uL      Absolute Lymphocytes 1.72 Thousands/µL      Absolute Monocytes 0.53 Thousand/µL      Eosinophils Absolute 0.24 Thousand/µL      Basophils Absolute 0.08 Thousands/µL                    US OB pregnancy limited with transvaginal   Final Result by Mariela Mckinney MD (09/09 2211)   Thickened heterogeneous endometrium compatible with blood products. There our 1 or 2 focal areas of vascularity within the endometrium possibly representing products of conception.      The study was marked in EPIC for immediate notification.         Workstation performed: [id number]                    Procedures  Procedures         ED Course  ED Course as of 09/10/24 1733   Mon Sep 09, 2024   1903 Hemoglobin: 13.4  Stable   1944 Spoke with Dr. Shah, OBGYN resident, reviewing pt's history, presentation and work up.  Agrees with US, will follow results   1954 Rh Factor: Positive   2001 Pt updated on labs.  Reports ongoing bleeding at this time, no improvement following pelvic exam.   2115 OB to see in consult   2150 Spoke with Dr. Lundberg, OBGYN attending, following evaluation.  Agrees pt is having active miscarriage, offered more aggressive management in the ED but pt declines.  Plan for quant hCG now with repeat hCG tomorrow.  Pt can be discharged home with continued close OP management.  Strict return precautions discussed.  Okay to discharge prior to formal US report.   2157 Discussed discharge plans and return precautions with patient.  She is in agreement.   2214 US OB pregnancy limited with transvaginal  Thickened heterogeneous endometrium compatible with blood products. There our 1 or 2 focal areas of vascularity within the endometrium possibly representing products of conception.                                 SBIRT 22yo+      Flowsheet Row Most Recent Value   Initial Alcohol Screen: US AUDIT-C     1. How often do you have a drink containing alcohol? 0 Filed at:  2024   2. How many drinks containing alcohol do you have on a typical day you are drinking?  0 Filed at: 2024   3b. FEMALE Any Age, or MALE 65+: How often do you have 4 or more drinks on one occassion? 0 Filed at: 2024   Audit-C Score 0 Filed at: 2024   MARSHA: How many times in the past year have you...    Used an illegal drug or used a prescription medication for non-medical reasons? Never Filed at: 2024                      Medical Decision Making  A 23 yo female who is  with previously diagnosed missed , now with vaginal bleeding.  Will check CBC for anemia.  Will check pelvic US to evaluate for ongoing miscarriage vs completion.  Will discuss with OB for definitive management.    Amount and/or Complexity of Data Reviewed  Labs: ordered. Decision-making details documented in ED Course.  Radiology: ordered. Decision-making details documented in ED Course.    Risk  Prescription drug management.                 Disposition  Final diagnoses:   Missed      Time reflects when diagnosis was documented in both MDM as applicable and the Disposition within this note       Time User Action Codes Description Comment    2024  9:31 PM Dania Loja Add [O02.1] Missed            ED Disposition       ED Disposition   Discharge    Condition   Stable    Date/Time   Mon Sep 9, 2024 2159    Comment   Allegra Camacho discharge to home/self care.                   Follow-up Information       Follow up With Specialties Details Why Contact Info Additional Information    Sumi Lundberg MD Obstetrics and Gynecology Schedule an appointment as soon as possible for a visit  For re-evaluation [address] [place] [id number]  [phone]       [facility] Emergency Department Emergency Medicine Go to  If symptoms worsen [facility] [id number] [phone] [facility] Emergency  Department, [address], [place], [address]            Discharge Medication List as of 9/9/2024 10:01 PM        CONTINUE these medications which have NOT CHANGED    Details   amphetamine-dextroamphetamine (ADDERALL XR) 30 MG 24 hr capsule Take 1 capsule (30 mg total) by mouth every morning Max Daily Amount: 30 mg, Starting Thu 9/5/2024, Normal      FIBER ADULT GUMMIES PO Take by mouth, Historical Med      hydrOXYzine HCL (ATARAX) 25 mg tablet Take 1 tablet (25 mg total) by mouth every 6 (six) hours as needed for anxiety, Starting Fri 10/27/2023, Normal      levothyroxine 75 mcg tablet take 1 tablet by mouth once daily, Starting Tue 4/2/2024, Normal      magnesium gluconate (MAGONATE) 500 mg tablet Take 1 tablet (500 mg total) by mouth 2 (two) times a day, Starting Sun 8/4/2024, Normal      Prenatal Vit-Fe Fumarate-FA (PRENATAL VITAMIN PO) Take by mouth, Historical Med             No discharge procedures on file.    PDMP Review         Value Time User    PDMP Reviewed  Yes 9/5/2024 12:38 PM Malathi Lambert DO            ED Provider  Electronically Signed by             Dania Loja DO  09/10/24 1899

## 2024-09-09 NOTE — TELEPHONE ENCOUNTER
"Pt called in stating she was found to have a missed AB on 24. She opted to schedule a D&C on 24. States she is unsure if she still needs that as she had vaginal bleeding that started on 24. States it was mainly only bleeding with wiping, she did have some cramping and passed a quarter sized clot last night. States that today her bleeding is a little less, denies any further cramping or clots. Advised will review with MD on next steps. Pt aware to call back with any heavy bleeding, severe pain, passing large clots or other concerns questions. Pt thankful.     Reason for Disposition   MILD vaginal bleeding (i.e., less than one pad per hour)    Answer Assessment - Initial Assessment Questions  1. DIAGNOSIS CONFIRMATION: \"When was the threatened miscarriage (threatened ) diagnosed?\" \"By whom?\"      Confirmed missed AB 24  2. ULTRASOUND: \"Was an ultrasound performed at that time?\"  If Yes, ask: \"Do you know what it showed?\"      yes  3. PREGNANCY: \"Do you know how many weeks or months pregnant you are?\" \"When was the first day of your last normal menstrual period?\"      Around 9w  4. MAIN CONCERN: \"What is your main concern right now?\" \"What questions do you have?\"      If she needs D&C or not  5. VAGINAL BLEEDING: \"Describe the bleeding that you are having.\" \"How much bleeding is there?\"     - SPOTTING: spotting or pinkish / brownish mucous discharge; 1-2 pads a day    - MILD:  less than 1 pad / hour; similar to mild menstrual bleeding    - MODERATE: 1-2 pads / hour; small-medium blood clots (e.g., pea, grape, small coin)     - SEVERE: soaking 2 or more pads/hour; bleeding not contained by pads or continuous red blood from vagina; large blood clots (e.g., golf ball, large coin)       spotting  6. ABDOMINAL PAIN: \"Do you have any abdominal pain?\"  If present, ask: \"How bad is it?\"  (e.g., Scale 1-10; mild, moderate, or severe)    - MILD (1-3): doesn't interfere with normal activities, abdomen " "soft and not tender to touch     - MODERATE (4-7): interferes with normal activities or awakens from sleep, tender to touch     - SEVERE (8-10): excruciating pain, doubled over, unable to do any normal activities      denies  7. HEMODYNAMIC STATUS: \"Are you weak or feeling lightheaded?\" If Yes, ask: \"Can you stand and walk normally?\"       denies  8. OTHER SYMPTOMS: \"What other symptoms are you having with the bleeding?\" (e.g., passed tissue, vaginal discharge, fever, menstrual-type cramps, nausea, vomiting)      denies    Protocols used:  - Threatened Miscarriage Follow-up Call-ADULT-[place]    "

## 2024-09-10 ENCOUNTER — LAB (OUTPATIENT)
Dept: LAB | Facility: CLINIC | Age: 24
End: 2024-09-10
Payer: COMMERCIAL

## 2024-09-10 DIAGNOSIS — O03.9 SAB (SPONTANEOUS ABORTION): ICD-10-CM

## 2024-09-10 DIAGNOSIS — F41.9 ANXIETY: ICD-10-CM

## 2024-09-10 LAB — B-HCG SERPL-ACNC: 2445 MIU/ML (ref 0–5)

## 2024-09-10 PROCEDURE — 36415 COLL VENOUS BLD VENIPUNCTURE: CPT

## 2024-09-10 PROCEDURE — 84702 CHORIONIC GONADOTROPIN TEST: CPT

## 2024-09-10 NOTE — CONSULTS
Consultation - Obstetrics & Gynecology  Allegra Camacho 24 y.o. female MRN: [id number]  Unit/Bed#: ED-05 Encounter: [id number]      Assessment & Plan   * Miscarriage  Assessment & Plan  Pt is a 23 yo  with an 9 week gestation miscarriage scheduled for D&E on Thursday. She presented with increased vaginal bleeding which has now slowed.   - TVUS shows heterogenous material most prominently in the PATEL without clear pregnancy structures  - Quantitative hCG pending  - Discussed options for expectant management vs misoprostol administration vs MVA. Patient desires expectant management at this time  - Hgb 13.4  - O positive, rhogam not indicated  - Quantitative hCG for trending ordered  - Stable for discharge        Counseling / Coordination of Care  Total floor / unit time spent today20 minutes  minutes. Greater than 50% of total time was spent with the patient and / or family counseling and / or coordination of care.     Plan of care discussed with Dr. Lundberg.     Inpatient consult to Obstetrics / Gynecology  Consult performed by: Alana Shah MD  Consult ordered by: Dania Loja DO        History of Present Illness   Physician Requesting Consult: Dania Loja DO  Reason for Consult / Principal Problem: Miscarriage    HPI: Allegra Camacho is a 24 y.o.  who presented with heavy vaginal bleeding. Spotting started on Friday and started to get heavier earlier today.  She started to saturate 1pad hourly with cramping that felt like they were contractions. The cramping has now resolved and bleeding has decreased to a slow trickle. She has a D&E scheduled for Thursday which she will keep the appointment for while we trend quants.She has no other complaints at this time.     Review of Systems  Negative unless stated above    Historical Information   Past Medical History:   Diagnosis Date    ADHD (attention deficit hyperactivity disorder)     Hypothyroid     Morbid obesity with BMI of 45.0-49.9, adult ([facility])   "   Preop cardiovascular exam 2016    Last Assessment & Plan:   Contraceptive options were reviewed including hormonal methods, both combination (pill, patch, vaginal ring) and progesterone-only (pill, Depo Provera, Implanon), intrauterine devices (Mirena, Paragard), barrier methods (condoms, diaphragm), and male and female sterilization.  The mechanism, risks, benefits, and side effects of all methods were discussed.  Doing well on O    Stool color abnormal 2023    pt reports had \"black\" stool episode last night- she is on iron supplement - will monitor and call if has another episode     Past Surgical History:   Procedure Laterality Date    EGD      FRACTURE SURGERY      [place] LAPS GSTRC RSTRICTIV PX LONGITUDINAL GASTRECTOMY N/A 2022    Procedure: GASTRECTOMY SLEEVE LAPAROSCOPIC AND INTAOPERATIVE EGD;  Surgeon: Arben Villatoro MD;  Location: [facility] OR;  Service: Bariatrics    WISDOM TOOTH EXTRACTION      WRIST SURGERY Right     secondary to a fracture     OB History    Para Term  AB Living   1 0 0 0 0 0   SAB IAB Ectopic Multiple Live Births   0 0 0 0 0      # Outcome Date GA Lbr Rocael/2nd Weight Sex Type Anes PTL Lv   1 Current              Family History   Problem Relation Age of Onset    Heart disease Paternal Grandfather     Diabetes Paternal Grandfather     Lung cancer Paternal Grandfather     Diabetes Sister         prediabetes    Thyroid disease Paternal Grandmother     ADD / ADHD Brother     Stroke Neg Hx      Social History   Social History     Substance and Sexual Activity   Alcohol Use Yes    Comment: sociably     Social History     Substance and Sexual Activity   Drug Use Never     Social History     Tobacco Use   Smoking Status Every Day   Smokeless Tobacco Former       Meds/Allergies   No current facility-administered medications for this encounter.       No Known Allergies    Objective   Vitals: Blood pressure 123/58, pulse 65, temperature 97.9 °F (36.6 °C), resp. rate " 16, weight 82 kg (180 lb 12.4 oz), last menstrual period 06/23/2024, SpO2 100%, not currently breastfeeding. Body mass index is 27.9 kg/m².    No intake or output data in the 24 hours ending 09/09/24 2143    Invasive Devices       Peripheral Intravenous Line  Duration             Peripheral IV 09/09/24 Right Antecubital <1 day                    Physical Exam  Vitals reviewed. Exam conducted with a chaperone present.   HENT:      Head: Normocephalic.      Nose: Nose normal.      Mouth/Throat:      Pharynx: Oropharynx is clear.   Eyes:      Conjunctiva/sclera: Conjunctivae normal.   Cardiovascular:      Rate and Rhythm: Normal rate.   Pulmonary:      Effort: Pulmonary effort is normal.   Abdominal:      Palpations: Abdomen is soft.      Tenderness: There is abdominal tenderness (diffuse). There is no guarding.   Genitourinary:     Comments: Minimal amount of trickle with fundal pressure on exam  Internal exam deferred at this time  Skin:     General: Skin is warm and dry.      Coloration: Skin is not jaundiced.   Neurological:      Mental Status: She is alert.   Psychiatric:         Mood and Affect: Mood normal.         Behavior: Behavior normal.         Lab Results:   Recent Results (from the past 24 hour(s))   CBC and differential    Collection Time: 09/09/24  6:55 PM   Result Value Ref Range    WBC 8.48 4.31 - 10.16 Thousand/uL    RBC 4.42 3.81 - 5.12 Million/uL    Hemoglobin 13.4 11.5 - 15.4 g/dL    Hematocrit 41.0 34.8 - 46.1 %    MCV 93 82 - 98 fL    MCH 30.3 26.8 - 34.3 pg    MCHC 32.7 31.4 - 37.4 g/dL    RDW 12.0 11.6 - 15.1 %    MPV 9.7 8.9 - 12.7 fL    Platelets 282 149 - 390 Thousands/uL    nRBC 0 /100 WBCs    Segmented % 69 43 - 75 %    Immature Grans % 1 0 - 2 %    Lymphocytes % 20 14 - 44 %    Monocytes % 6 4 - 12 %    Eosinophils Relative 3 0 - 6 %    Basophils Relative 1 0 - 1 %    Absolute Neutrophils 5.87 1.85 - 7.62 Thousands/µL    Absolute Immature Grans 0.04 0.00 - 0.20 Thousand/uL    Absolute  Lymphocytes 1.72 0.60 - 4.47 Thousands/µL    Absolute Monocytes 0.53 0.17 - 1.22 Thousand/µL    Eosinophils Absolute 0.24 0.00 - 0.61 Thousand/µL    Basophils Absolute 0.08 0.00 - 0.10 Thousands/µL   Basic metabolic panel    Collection Time: 09/09/24  6:55 PM   Result Value Ref Range    Sodium 138 135 - 147 mmol/L    Potassium 4.1 3.5 - 5.3 mmol/L    Chloride 107 96 - 108 mmol/L    CO2 22 21 - 32 mmol/L    ANION GAP 9 4 - 13 mmol/L    BUN 11 5 - 25 mg/dL    Creatinine 0.59 (L) 0.60 - 1.30 mg/dL    Glucose 91 65 - 140 mg/dL    Calcium 8.9 8.4 - 10.2 mg/dL    eGFR 127 ml/min/1.73sq m   Type and screen    Collection Time: 09/09/24  6:55 PM   Result Value Ref Range    ABO Grouping O     Rh Factor Positive     Antibody Screen Negative     Specimen Expiration Date [id number]        Imaging:  TVUS completed, official read still pending      Alana Shah MD   PGY-2, OBGYN  9/9/2024  9:43 PM

## 2024-09-10 NOTE — ASSESSMENT & PLAN NOTE
Pt is a 25 yo  with an 9 week gestation miscarriage scheduled for D&E on Thursday. She presented with increased vaginal bleeding which has now slowed.   - TVUS shows heterogenous material most prominently in the PATEL without clear pregnancy structures  - Quantitative hCG pending  - Discussed options for expectant management vs misoprostol administration vs MVA. Patient desires expectant management at this time  - Hgb 13.4  - O positive, rhogam not indicated  - Quantitative hCG for trending ordered  - Stable for discharge

## 2024-09-10 NOTE — DISCHARGE INSTRUCTIONS
Return to the ED if you develop dizziness, lightheadedness, chest pain or trouble breathing - these are signs of low blood counts.     Obtain repeat hCG tomorrow, discuss results with OBGYN

## 2024-09-11 ENCOUNTER — TELEPHONE (OUTPATIENT)
Dept: OBGYN CLINIC | Facility: CLINIC | Age: 24
End: 2024-09-11

## 2024-09-11 NOTE — RESULT ENCOUNTER NOTE
Please call pt with results and to cancel surgery for tomorrow.    HCG shows marked decline in setting of SAB. This is great, supports that she likely passed it on her own and we can follow with labs only. Can repeat hcg tomorrow, to again follow closely, then weekly until negative.

## 2024-09-11 NOTE — TELEPHONE ENCOUNTER
Left messages X2 for patient to call back to reschedule appointment with Dr. Do on 9/13 due to Dr being in surgery in afternoon.

## 2024-09-12 ENCOUNTER — APPOINTMENT (OUTPATIENT)
Dept: LAB | Facility: CLINIC | Age: 24
End: 2024-09-12
Payer: COMMERCIAL

## 2024-09-12 DIAGNOSIS — O03.9 SAB (SPONTANEOUS ABORTION): ICD-10-CM

## 2024-09-12 LAB — B-HCG SERPL-ACNC: 650.9 MIU/ML (ref 0–5)

## 2024-09-12 PROCEDURE — 84702 CHORIONIC GONADOTROPIN TEST: CPT

## 2024-09-12 PROCEDURE — 36415 COLL VENOUS BLD VENIPUNCTURE: CPT

## 2024-09-12 RX ORDER — HYDROXYZINE HYDROCHLORIDE 25 MG/1
25 TABLET, FILM COATED ORAL EVERY 6 HOURS PRN
Qty: 25 TABLET | Refills: 0 | Status: SHIPPED | OUTPATIENT
Start: 2024-09-12

## 2024-09-19 ENCOUNTER — APPOINTMENT (OUTPATIENT)
Dept: LAB | Facility: CLINIC | Age: 24
End: 2024-09-19
Payer: COMMERCIAL

## 2024-09-19 DIAGNOSIS — O03.9 SAB (SPONTANEOUS ABORTION): ICD-10-CM

## 2024-09-19 LAB — B-HCG SERPL-ACNC: 72.1 MIU/ML (ref 0–5)

## 2024-09-19 PROCEDURE — 36415 COLL VENOUS BLD VENIPUNCTURE: CPT

## 2024-09-19 PROCEDURE — 84702 CHORIONIC GONADOTROPIN TEST: CPT

## 2024-09-25 NOTE — RESULT ENCOUNTER NOTE
Results not reviewed via [employer]. Please call to review results as per [employer] message attached.

## 2024-09-30 ENCOUNTER — APPOINTMENT (OUTPATIENT)
Dept: LAB | Facility: CLINIC | Age: 24
End: 2024-09-30
Payer: COMMERCIAL

## 2024-09-30 ENCOUNTER — TELEPHONE (OUTPATIENT)
Dept: OBGYN CLINIC | Facility: CLINIC | Age: 24
End: 2024-09-30

## 2024-09-30 DIAGNOSIS — O03.9 SAB (SPONTANEOUS ABORTION): ICD-10-CM

## 2024-09-30 LAB — B-HCG SERPL-ACNC: 10 MIU/ML (ref 0–5)

## 2024-09-30 PROCEDURE — 84702 CHORIONIC GONADOTROPIN TEST: CPT

## 2024-09-30 PROCEDURE — 36415 COLL VENOUS BLD VENIPUNCTURE: CPT

## 2024-10-03 ENCOUNTER — OFFICE VISIT (OUTPATIENT)
Dept: OBGYN CLINIC | Facility: CLINIC | Age: 24
End: 2024-10-03
Payer: COMMERCIAL

## 2024-10-03 VITALS
SYSTOLIC BLOOD PRESSURE: 128 MMHG | DIASTOLIC BLOOD PRESSURE: 82 MMHG | HEIGHT: 68 IN | WEIGHT: 181.4 LBS | BODY MASS INDEX: 27.49 KG/M2

## 2024-10-03 DIAGNOSIS — O03.9 SAB (SPONTANEOUS ABORTION): Primary | ICD-10-CM

## 2024-10-03 PROCEDURE — 99213 OFFICE O/P EST LOW 20 MIN: CPT | Performed by: OBSTETRICS & GYNECOLOGY

## 2024-10-03 NOTE — PROGRESS NOTES
"Assessment:  24 y.o.  who presents as a follow up of SAB.    Plan:  Diagnoses and all orders for this visit:    SAB (spontaneous )    - Continue to trend hcg  - Discussed expectations for menstrual return and TTC    __________________________________________________________________    Subjective   Allegra Camacho is a 24 y.o.  who presents as a follow up of SAB.    Patient reports bleeding stopped for approx 1wk, then started bleeding again Monday. Decreased over the last few days and very light now. Cramping moderate at onset of the miscarriage, but improved after passage of majority of tissue. Presently feels well and pain is minimal. Denies dizziness, palpitations, sob, fatigue.         The following portions of the patient's history were reviewed and updated as appropriate: allergies, current medications, past medical history, past social history, past surgical history, and problem list.    Review of Systems  Review of Systems   Constitutional:  Negative for chills and fever.   Respiratory:  Negative for shortness of breath.    Cardiovascular:  Negative for chest pain and palpitations.   Gastrointestinal:  Negative for abdominal distention, abdominal pain, constipation, diarrhea, nausea and vomiting.   Genitourinary:  Positive for vaginal bleeding. Negative for dysuria, flank pain, frequency, genital sores, pelvic pain, vaginal discharge and vaginal pain.   Skin:  Negative for rash and wound.   Neurological:  Negative for dizziness, light-headedness and headaches.            Objective  /82   Ht 5' 7.5\" (1.715 m)   Wt 82.3 kg (181 lb 6.4 oz)   LMP 2024 (Exact Date)   BMI 27.99 kg/m²      Physical Exam:  Physical Exam  Constitutional:       General: She is not in acute distress.     Appearance: Normal appearance. She is not ill-appearing, toxic-appearing or diaphoretic.   Eyes:      General: No scleral icterus.        Right eye: No discharge.         Left eye: No discharge.      " Conjunctiva/sclera: Conjunctivae normal.   Cardiovascular:      Rate and Rhythm: Normal rate.   Pulmonary:      Effort: Pulmonary effort is normal. No respiratory distress.   Abdominal:      General: There is no distension.      Palpations: There is no mass.      Tenderness: There is no abdominal tenderness. There is no guarding or rebound.      Hernia: No hernia is present.   Musculoskeletal:         General: No swelling.   Skin:     General: Skin is warm and dry.      Coloration: Skin is not jaundiced or pale.      Findings: No bruising or erythema.   Neurological:      Mental Status: She is alert.   Psychiatric:         Mood and Affect: Mood normal.         Behavior: Behavior normal.         Thought Content: Thought content normal.         Judgment: Judgment normal.

## 2024-10-08 ENCOUNTER — TELEPHONE (OUTPATIENT)
Age: 24
End: 2024-10-08

## 2024-10-08 DIAGNOSIS — F90.0 ATTENTION DEFICIT HYPERACTIVITY DISORDER (ADHD), PREDOMINANTLY INATTENTIVE TYPE: ICD-10-CM

## 2024-10-08 NOTE — RESULT ENCOUNTER NOTE
Results not reviewed via [employer]. Please call to review results as per [employer] message attached.

## 2024-10-08 NOTE — TELEPHONE ENCOUNTER
----- Message from Sumi Lundberg MD sent at 10/8/2024  4:23 PM EDT -----  Results not reviewed via [employer]. Please call to review results as per [employer] message attached.

## 2024-10-10 RX ORDER — DEXTROAMPHETAMINE SACCHARATE, AMPHETAMINE ASPARTATE MONOHYDRATE, DEXTROAMPHETAMINE SULFATE AND AMPHETAMINE SULFATE 7.5; 7.5; 7.5; 7.5 MG/1; MG/1; MG/1; MG/1
30 CAPSULE, EXTENDED RELEASE ORAL EVERY MORNING
Qty: 30 CAPSULE | Refills: 0 | Status: SHIPPED | OUTPATIENT
Start: 2024-10-10

## 2024-10-25 ENCOUNTER — OFFICE VISIT (OUTPATIENT)
Dept: OBGYN CLINIC | Facility: CLINIC | Age: 24
End: 2024-10-25
Payer: COMMERCIAL

## 2024-10-25 VITALS
BODY MASS INDEX: 27.43 KG/M2 | HEIGHT: 68 IN | SYSTOLIC BLOOD PRESSURE: 128 MMHG | DIASTOLIC BLOOD PRESSURE: 88 MMHG | WEIGHT: 181 LBS | HEART RATE: 99 BPM

## 2024-10-25 DIAGNOSIS — M23.92 INTERNAL DERANGEMENT OF LEFT KNEE: Primary | ICD-10-CM

## 2024-10-25 DIAGNOSIS — M25.562 CHRONIC PAIN OF LEFT KNEE: ICD-10-CM

## 2024-10-25 DIAGNOSIS — G89.29 CHRONIC PAIN OF LEFT KNEE: ICD-10-CM

## 2024-10-25 PROCEDURE — 99204 OFFICE O/P NEW MOD 45 MIN: CPT | Performed by: STUDENT IN AN ORGANIZED HEALTH CARE EDUCATION/TRAINING PROGRAM

## 2024-11-07 DIAGNOSIS — E03.9 ACQUIRED HYPOTHYROIDISM: ICD-10-CM

## 2024-11-07 RX ORDER — LEVOTHYROXINE SODIUM 75 UG/1
75 TABLET ORAL DAILY
Qty: 90 TABLET | Refills: 1 | Status: SHIPPED | OUTPATIENT
Start: 2024-11-07

## 2024-11-15 DIAGNOSIS — F90.0 ATTENTION DEFICIT HYPERACTIVITY DISORDER (ADHD), PREDOMINANTLY INATTENTIVE TYPE: ICD-10-CM

## 2024-11-15 NOTE — TELEPHONE ENCOUNTER
Requested Renewals     amphetamine-dextroamphetamine (ADDERALL XR) 30 MG 24 hr capsule         Sig: Take 1 capsule (30 mg total) by mouth every morning Max Daily Amount: 30 mg    Disp: 30 capsule    Refills: 0    Start: 11/15/2024    Earliest Fill Date: 11/15/2024    Class: Normal    PDMP Needs Review    Non-formulary For: Attention deficit hyperactivity disorder (ADHD), predominantly inattentive type    Last ordered: 1 month ago (10/10/2024) by Malathi Lambert DO    Psychiatry:  Stimulants/ADHD [id number]/15/2024 07:42 AM   Protocol Details This refill cannot be delegated    Valid encounter within last 6 months      To be filled at: [id number] - [place] - [address] [place] [address]

## 2024-11-17 DIAGNOSIS — F41.9 ANXIETY: ICD-10-CM

## 2024-11-18 DIAGNOSIS — F90.0 ATTENTION DEFICIT HYPERACTIVITY DISORDER (ADHD), PREDOMINANTLY INATTENTIVE TYPE: ICD-10-CM

## 2024-11-18 RX ORDER — DEXTROAMPHETAMINE SACCHARATE, AMPHETAMINE ASPARTATE MONOHYDRATE, DEXTROAMPHETAMINE SULFATE AND AMPHETAMINE SULFATE 7.5; 7.5; 7.5; 7.5 MG/1; MG/1; MG/1; MG/1
30 CAPSULE, EXTENDED RELEASE ORAL EVERY MORNING
Qty: 30 CAPSULE | Refills: 0 | Status: SHIPPED | OUTPATIENT
Start: 2024-11-18 | End: 2024-11-18 | Stop reason: SDUPTHER

## 2024-11-18 RX ORDER — DEXTROAMPHETAMINE SACCHARATE, AMPHETAMINE ASPARTATE MONOHYDRATE, DEXTROAMPHETAMINE SULFATE AND AMPHETAMINE SULFATE 7.5; 7.5; 7.5; 7.5 MG/1; MG/1; MG/1; MG/1
30 CAPSULE, EXTENDED RELEASE ORAL EVERY MORNING
Qty: 30 CAPSULE | Refills: 0 | Status: SHIPPED | OUTPATIENT
Start: 2024-11-18

## 2024-11-18 RX ORDER — HYDROXYZINE HYDROCHLORIDE 25 MG/1
25 TABLET, FILM COATED ORAL EVERY 6 HOURS PRN
Qty: 30 TABLET | Refills: 5 | Status: SHIPPED | OUTPATIENT
Start: 2024-11-18

## 2024-11-18 NOTE — TELEPHONE ENCOUNTER
Patient is asking for the prescription to be sent to [facility] in [place], due to patient is out of medication and the [facility] in [place] has been unable to get the medication in.

## 2024-11-18 NOTE — TELEPHONE ENCOUNTER
Patient called in to check the status of refill. Patient made aware that we are waiting for approval.   Patient is out of the medication.

## 2024-11-18 NOTE — TELEPHONE ENCOUNTER
Patient called in to check the status of refill. Patient made aware that we are waiting for approval.   Patient states that she never picked up the script in September and the pharmacy pick the script back on the shelf.

## 2024-11-18 NOTE — TELEPHONE ENCOUNTER
Patient called requesting refill for Adderall. Patient made aware medication was refilled on 11/18/24 for 30 with 0 refills to [facility] pharmacy. Patient instructed to contact the pharmacy to obtain refills of medication. Patient verbalized understanding.

## 2024-12-10 ENCOUNTER — TELEPHONE (OUTPATIENT)
Age: 24
End: 2024-12-10

## 2024-12-10 NOTE — TELEPHONE ENCOUNTER
Established pt with + preg test LMP 11/12/24 sched first available for data and via on 1/24/24 pt will be 3 days over 10 weeks please call to resched if need too.

## 2024-12-10 NOTE — TELEPHONE ENCOUNTER
Patient is fine to be seen on 1/24. She will be 10w3d. We are able to do ultrasounds in office up to 10w6d.

## 2024-12-13 ENCOUNTER — OFFICE VISIT (OUTPATIENT)
Dept: FAMILY MEDICINE CLINIC | Facility: CLINIC | Age: 24
End: 2024-12-13
Payer: COMMERCIAL

## 2024-12-13 ENCOUNTER — APPOINTMENT (OUTPATIENT)
Dept: LAB | Facility: CLINIC | Age: 24
End: 2024-12-13
Payer: COMMERCIAL

## 2024-12-13 VITALS
SYSTOLIC BLOOD PRESSURE: 128 MMHG | WEIGHT: 183.4 LBS | HEIGHT: 68 IN | BODY MASS INDEX: 27.8 KG/M2 | DIASTOLIC BLOOD PRESSURE: 82 MMHG | TEMPERATURE: 97.2 F | RESPIRATION RATE: 18 BRPM | OXYGEN SATURATION: 99 % | HEART RATE: 101 BPM

## 2024-12-13 DIAGNOSIS — Z3A.01 LESS THAN 8 WEEKS GESTATION OF PREGNANCY: ICD-10-CM

## 2024-12-13 DIAGNOSIS — F90.0 ATTENTION DEFICIT HYPERACTIVITY DISORDER (ADHD), PREDOMINANTLY INATTENTIVE TYPE: ICD-10-CM

## 2024-12-13 DIAGNOSIS — Z3A.01 LESS THAN 8 WEEKS GESTATION OF PREGNANCY: Primary | ICD-10-CM

## 2024-12-13 DIAGNOSIS — E03.9 ACQUIRED HYPOTHYROIDISM: ICD-10-CM

## 2024-12-13 DIAGNOSIS — K91.2 POSTSURGICAL MALABSORPTION: ICD-10-CM

## 2024-12-13 DIAGNOSIS — R79.89 LOW VITAMIN B12 LEVEL: ICD-10-CM

## 2024-12-13 LAB
B-HCG SERPL-ACNC: 185.7 MIU/ML (ref 0–5)
VIT B12 SERPL-MCNC: 203 PG/ML (ref 180–914)

## 2024-12-13 PROCEDURE — 84702 CHORIONIC GONADOTROPIN TEST: CPT

## 2024-12-13 PROCEDURE — 36415 COLL VENOUS BLD VENIPUNCTURE: CPT

## 2024-12-13 PROCEDURE — 99214 OFFICE O/P EST MOD 30 MIN: CPT | Performed by: FAMILY MEDICINE

## 2024-12-13 PROCEDURE — 82607 VITAMIN B-12: CPT

## 2024-12-13 NOTE — PROGRESS NOTES
Name: Allegra Camacho      : 2000      MRN: [id number]  Encounter Provider: Malathi Lambert DO  Encounter Date: 2024   Encounter department: [facility]  :  Assessment & Plan  Attention deficit hyperactivity disorder (ADHD), predominantly inattentive type  PDMP reviewed and no red flags; controlled substance agreement renewed 2024  we had long discussion at visit in August regarding being on Adderall while pregnant, and while literature review shows no studies that indicate risk to developing fetus, the medication Does enter the breast milk, and so pt would have to stop med if she wanted to breastfeed; she had already read up on the topic of being on Adderall while pregnant previously and had settled on plan to go off the med sometime before delivery, as she is planning to breastfeed. We discussed timeline of discontinuing the Adderall before beginning of 3rd trimester and remaining off. I also gave pt information to sign herself up with [facility] [place] for patients treated with stimulants for ADHD. She is planning to work for as long as possible, and finds it very difficult to perform her job adequately when her ADHD is uncontrolled        Acquired hypothyroidism  cpm       Less than 8 weeks gestation of pregnancy  Just did home pregnancy tests last week and were positive   LMP 24 - has OB appt end of January ]  Orders:    hCG, quantitative; Future      Chief Complaint   Patient presents with    Follow-up     4mo f/u          History of Present Illness     Scheduled f/u  Also reports she is currently pregnant- did home test 4 days ago and was positive - approx 4 weeks along - LMP 24 - has OB appt end of 2024  [facility]   we had long discussion regarding being on Adderall while pregnant, and while literature review shows no studies that indicate risk to developing fetus, the medication Does enter the breast milk, and  "so pt would have to stop med if she wanted to breastfeed; she had already read up on the topic of being on Adderall while pregnant prior to today's appt and had settled on plan to go off the med sometime before delivery, as she is planning to breastfeed. We discussed timeline of discontinuing the Adderall before beginning of 3rd trimester and remaining off. I will obtain information for pt to sign herself up with [facility] [place] for patients treated with stimulants for ADHD. She is planning to work for as long as possible, and finds it very difficult to perform her job adequately when her ADHD is uncontrolled  She has her first OB appt 8/29/24  TFT's were normal/controlled in February, but will recheck given new pregnancy         Review of Systems   Constitutional: Negative.    Respiratory: Negative.     Cardiovascular: Negative.    Gastrointestinal: Negative.    Neurological: Negative.    Hematological: Negative.    All other systems reviewed and are negative.      Objective   /82 (BP Location: Left arm, Patient Position: Sitting, Cuff Size: Standard)   Pulse 101   Temp (!) 97.2 °F (36.2 °C) (Tympanic)   Resp 18   Ht 5' 7.5\" (1.715 m)   Wt 83.2 kg (183 lb 6.4 oz)   SpO2 99%   BMI 28.30 kg/m²      Physical Exam  Vitals and nursing note reviewed.   Constitutional:       General: She is not in acute distress.     Appearance: She is well-developed and well-groomed. She is not ill-appearing, toxic-appearing or diaphoretic.   HENT:      Head: Normocephalic and atraumatic.      Nose: Nose normal.      Mouth/Throat:      Lips: Pink.      Mouth: Mucous membranes are moist.      Pharynx: Oropharynx is clear. Uvula midline.   Eyes:      General: Lids are normal.      Conjunctiva/sclera: Conjunctivae normal.      Pupils: Pupils are equal, round, and reactive to light.   Neck:      Thyroid: No thyroid mass, thyromegaly or thyroid tenderness.      Vascular: No JVD.   Cardiovascular:      Rate and Rhythm: " Normal rate and regular rhythm.      Pulses: Normal pulses.      Heart sounds: Normal heart sounds.   Pulmonary:      Effort: Pulmonary effort is normal.      Breath sounds: Normal breath sounds and air entry.   Abdominal:      General: Bowel sounds are normal.      Palpations: Abdomen is soft. There is no hepatomegaly, splenomegaly or mass.      Tenderness: There is no abdominal tenderness.   Musculoskeletal:      Cervical back: Neck supple.      Right lower leg: No edema.      Left lower leg: No edema.   Lymphadenopathy:      Cervical: No cervical adenopathy.   Skin:     General: Skin is warm and dry.      Capillary Refill: Capillary refill takes less than 2 seconds.      Coloration: Skin is not pale.   Neurological:      Mental Status: She is alert and oriented to person, place, and time.      Gait: Gait normal.   Psychiatric:         Mood and Affect: Mood normal.         Behavior: Behavior normal. Behavior is cooperative.         Cognition and Memory: Cognition and memory normal.         Judgment: Judgment normal.

## 2024-12-13 NOTE — ASSESSMENT & PLAN NOTE
PDMP reviewed and no red flags; controlled substance agreement renewed 8/5/2024  we had long discussion at visit in August regarding being on Adderall while pregnant, and while literature review shows no studies that indicate risk to developing fetus, the medication Does enter the breast milk, and so pt would have to stop med if she wanted to breastfeed; she had already read up on the topic of being on Adderall while pregnant previously and had settled on plan to go off the med sometime before delivery, as she is planning to breastfeed. We discussed timeline of discontinuing the Adderall before beginning of 3rd trimester and remaining off. I also gave pt information to sign herself up with [facility] [place] for patients treated with stimulants for ADHD. She is planning to work for as long as possible, and finds it very difficult to perform her job adequately when her ADHD is uncontrolled

## 2024-12-16 ENCOUNTER — RESULTS FOLLOW-UP (OUTPATIENT)
Dept: BARIATRICS | Facility: CLINIC | Age: 24
End: 2024-12-16

## 2024-12-16 NOTE — RESULT ENCOUNTER NOTE
Please let Allegra know that her B12 is low - I recommend B12 injections but please have her confirm this with OB first d/t pregnancy. I would recommend 1,000mcg IM weekly x 4 and take extra 2,000mcg sublingual B12 daily as well to keep her levels up. Thank you

## 2024-12-17 ENCOUNTER — RESULTS FOLLOW-UP (OUTPATIENT)
Dept: OBGYN CLINIC | Facility: MEDICAL CENTER | Age: 24
End: 2024-12-17

## 2024-12-17 ENCOUNTER — APPOINTMENT (OUTPATIENT)
Dept: LAB | Facility: CLINIC | Age: 24
End: 2024-12-17
Payer: COMMERCIAL

## 2024-12-17 ENCOUNTER — RESULTS FOLLOW-UP (OUTPATIENT)
Age: 24
End: 2024-12-17

## 2024-12-17 DIAGNOSIS — Z3A.01 LESS THAN 8 WEEKS GESTATION OF PREGNANCY: ICD-10-CM

## 2024-12-17 DIAGNOSIS — Z3A.01 LESS THAN 8 WEEKS GESTATION OF PREGNANCY: Primary | ICD-10-CM

## 2024-12-17 LAB — B-HCG SERPL-ACNC: 1044.9 MIU/ML (ref 0–5)

## 2024-12-17 PROCEDURE — 84702 CHORIONIC GONADOTROPIN TEST: CPT

## 2024-12-17 PROCEDURE — 36415 COLL VENOUS BLD VENIPUNCTURE: CPT

## 2024-12-30 ENCOUNTER — TELEPHONE (OUTPATIENT)
Age: 24
End: 2024-12-30

## 2024-12-30 DIAGNOSIS — Z87.59 HISTORY OF MISCARRIAGE: ICD-10-CM

## 2024-12-30 DIAGNOSIS — O09.299 HISTORY OF MISCARRIAGE, CURRENTLY PREGNANT: Primary | ICD-10-CM

## 2024-12-30 NOTE — TELEPHONE ENCOUNTER
[facility]ablished [facility] of [facility] called requesting a script be placed for HCG labs. Pt has completed HCG labs on 12/13 and 12/17 already. Communicated Hx of miscarriage and is anxious for D&V appt. Requested sooner appt, no available sooner appt's. She is on the wait list.

## 2024-12-31 ENCOUNTER — APPOINTMENT (OUTPATIENT)
Dept: LAB | Facility: CLINIC | Age: 24
End: 2024-12-31
Payer: COMMERCIAL

## 2024-12-31 DIAGNOSIS — Z87.59 HISTORY OF MISCARRIAGE: ICD-10-CM

## 2024-12-31 LAB — B-HCG SERPL-ACNC: ABNORMAL MIU/ML (ref 0–5)

## 2024-12-31 PROCEDURE — 84702 CHORIONIC GONADOTROPIN TEST: CPT

## 2024-12-31 PROCEDURE — 36415 COLL VENOUS BLD VENIPUNCTURE: CPT

## 2024-12-31 NOTE — TELEPHONE ENCOUNTER
Pt returned a call. RN provided recommendations as per provider. Pt agreeable to plan. Would like to also obtain outpatient US. RN provided Central Scheduling's phone number. RN placed order per protocol. No further questions.

## 2024-12-31 NOTE — TELEPHONE ENCOUNTER
Hcg ordered for weekly testing until US. By LMP, presently 7wk EGA. I looked at my schedule but unfortunately no available spot to even overbook her for this US until scheduled date. May offer pt US with radiology if she prefers. If so, would advise doing in 1st 2wk of Jan (keep currently scheduled appt in case repeat is needed, will adjust post-result as needed).

## 2025-01-02 ENCOUNTER — RESULTS FOLLOW-UP (OUTPATIENT)
Dept: OBGYN CLINIC | Facility: CLINIC | Age: 25
End: 2025-01-02

## 2025-01-06 ENCOUNTER — HOSPITAL ENCOUNTER (OUTPATIENT)
Dept: ULTRASOUND IMAGING | Facility: HOSPITAL | Age: 25
Discharge: HOME | End: 2025-01-06
Attending: OBSTETRICS & GYNECOLOGY
Payer: COMMERCIAL

## 2025-01-06 DIAGNOSIS — O09.299 HISTORY OF MISCARRIAGE, CURRENTLY PREGNANT: ICD-10-CM

## 2025-01-06 PROCEDURE — 76801 OB US < 14 WKS SINGLE FETUS: CPT

## 2025-01-07 ENCOUNTER — TELEPHONE (OUTPATIENT)
Age: 25
End: 2025-01-07

## 2025-01-07 NOTE — TELEPHONE ENCOUNTER
Patient called in, requesting the results of their recent ultrasound.     Advised patient, results have not yet been reviewed, however we will send to the provider and reach back out with the interpretation and recommendations.

## 2025-01-08 NOTE — TELEPHONE ENCOUNTER
Pt called in again regarding her US results. She has concerns after reading the report and would like to know final results/recommendations. Pt wondering if her upcoming appointment should be any sooner. Advised will review with MD and someone will be in touch. Pt thankful.

## 2025-01-09 ENCOUNTER — TELEPHONE (OUTPATIENT)
Dept: OBGYN CLINIC | Facility: CLINIC | Age: 25
End: 2025-01-09

## 2025-01-09 ENCOUNTER — APPOINTMENT (OUTPATIENT)
Dept: LAB | Facility: CLINIC | Age: 25
End: 2025-01-09
Payer: COMMERCIAL

## 2025-01-09 DIAGNOSIS — Z87.59 HISTORY OF MISCARRIAGE: ICD-10-CM

## 2025-01-09 LAB — B-HCG SERPL-ACNC: ABNORMAL MIU/ML (ref 0–5)

## 2025-01-09 PROCEDURE — 84702 CHORIONIC GONADOTROPIN TEST: CPT

## 2025-01-09 PROCEDURE — 36415 COLL VENOUS BLD VENIPUNCTURE: CPT

## 2025-01-09 NOTE — TELEPHONE ENCOUNTER
See result note on US. Thought it was routed to box but don't see that its been addressed; disregard if addressed.

## 2025-01-09 NOTE — TELEPHONE ENCOUNTER
Spoke to pt,pt is aware of current viable pregnancy ,pt will keep D&V appt on 1//24 and call office with any concerns

## 2025-01-09 NOTE — TELEPHONE ENCOUNTER
----- Message from Sumi Lundberg MD sent at 1/8/2025  1:40 PM EST -----  Please call pt with results and to adjust appt.     US shows a viable 7w4d pregnancy. I understand her concerns with the verbiage used, but alone the discrepancy is a risk factor but unfortunately not a modifiable feature and its a watch and wait scenario here. I would recommend we do an US at her [facility]  oming visit 1/24 though (if not already scheduled as such) to follow closely.

## 2025-01-13 DIAGNOSIS — F90.0 ATTENTION DEFICIT HYPERACTIVITY DISORDER (ADHD), PREDOMINANTLY INATTENTIVE TYPE: ICD-10-CM

## 2025-01-13 NOTE — TELEPHONE ENCOUNTER
Requested Renewals     amphetamine-dextroamphetamine (ADDERALL XR) 30 MG 24 hr capsule         Sig: Take 1 capsule (30 mg total) by mouth every morning Max Daily Amount: 30 mg    Disp: 30 capsule    Refills: 0    Start: 1/13/2025    Earliest Fill Date: 1/13/2025    Class: Normal    PDMP Needs Review    Non-formulary For: Attention deficit hyperactivity disorder (ADHD), predominantly inattentive type    Last ordered: 1 month ago (11/18/2024) by Malathi Lambert DO    Psychiatry:  Stimulants/ADHD [id number]/13/2025 08:12 AM   Protocol Details This refill cannot be delegated    Valid encounter within last 6 months      To be filled at: [id number] - [place] - [address] [place] [address]

## 2025-01-15 ENCOUNTER — APPOINTMENT (OUTPATIENT)
Dept: LAB | Facility: CLINIC | Age: 25
End: 2025-01-15
Payer: COMMERCIAL

## 2025-01-15 DIAGNOSIS — Z87.59 HISTORY OF MISCARRIAGE: ICD-10-CM

## 2025-01-15 PROCEDURE — 36415 COLL VENOUS BLD VENIPUNCTURE: CPT

## 2025-01-16 ENCOUNTER — TRANSCRIBE ORDERS (OUTPATIENT)
Dept: LAB | Facility: CLINIC | Age: 25
End: 2025-01-16

## 2025-01-16 ENCOUNTER — APPOINTMENT (OUTPATIENT)
Dept: LAB | Facility: CLINIC | Age: 25
End: 2025-01-16
Payer: COMMERCIAL

## 2025-01-16 DIAGNOSIS — Z87.59 HISTORY OF MISCARRIAGE: Primary | ICD-10-CM

## 2025-01-16 DIAGNOSIS — Z87.59 HISTORY OF MISCARRIAGE: ICD-10-CM

## 2025-01-16 LAB — B-HCG SERPL-ACNC: ABNORMAL MIU/ML (ref 0–5)

## 2025-01-16 PROCEDURE — 84702 CHORIONIC GONADOTROPIN TEST: CPT

## 2025-01-16 PROCEDURE — 36415 COLL VENOUS BLD VENIPUNCTURE: CPT

## 2025-01-16 RX ORDER — DEXTROAMPHETAMINE SACCHARATE, AMPHETAMINE ASPARTATE MONOHYDRATE, DEXTROAMPHETAMINE SULFATE AND AMPHETAMINE SULFATE 7.5; 7.5; 7.5; 7.5 MG/1; MG/1; MG/1; MG/1
30 CAPSULE, EXTENDED RELEASE ORAL EVERY MORNING
Qty: 30 CAPSULE | Refills: 0 | Status: SHIPPED | OUTPATIENT
Start: 2025-01-16

## 2025-01-17 ENCOUNTER — RESULTS FOLLOW-UP (OUTPATIENT)
Dept: OBGYN CLINIC | Facility: CLINIC | Age: 25
End: 2025-01-17

## 2025-01-21 ENCOUNTER — NURSE TRIAGE (OUTPATIENT)
Age: 25
End: 2025-01-21

## 2025-01-21 NOTE — TELEPHONE ENCOUNTER
"10w0d,  OB reporting she went to the bathroom this morning and wiped and noted very light pink discharge. No cramping, no active bleeding. Last intercourse was 2-3 days ago. Unknown blood type. No US in pregnancy yet. Discussed with patient light spotting can be normal in pregnancy. It is known that in pregnancy, the cervix is very vascular which means there is an increase in blood flow to the area. Due to this, things such as vaginal exams or sexual intercourse can lead to spotting. Reviewed with patient monitoring symptoms in the meantime, and contacting the office back with any new or worsening symptoms.  Encouraged she keep her D&V as scheduled for Friday.     ESC sent to Dr Sapp.     Reason for Disposition   SPOTTING after a pelvic examination (single or brief episode)    Answer Assessment - Initial Assessment Questions  1. ONSET: \"When did this bleeding start?\"        Today x1 with wiping   2. BLEEDING SEVERITY: \"Describe the bleeding that you are having.\" \"How much bleeding is there?\"       Light pink discharge x1 instance   3. ABDOMEN PAIN: \"Do you have any pain?\" \"How bad is the pain?\"  (e.g., Scale 0-10; none, mild, moderate, or severe)      no  4. PREGNANCY: \"Do you know how many weeks or months pregnant you are?\" \"When was the first day of your last normal menstrual period?\"      10w0d  5. ULTRASOUND: \"Have you had an ultrasound during this pregnancy?\"  Note: To confirm intrauterine pregnancy, placenta location.      no  6. HEMODYNAMIC STATUS: \"Are you weak or feeling lightheaded?\" If Yes, ask: \"Can you stand and walk normally?\"       no  7. OTHER SYMPTOMS: \"What other symptoms are you having with the bleeding?\" (e.g., passed tissue, vaginal discharge, fever, menstrual-type cramps)      no    Protocols used: Pregnancy - Vaginal Bleeding Less Than 20 Weeks EGA-Adult-[place]    "

## 2025-01-24 ENCOUNTER — ULTRASOUND (OUTPATIENT)
Dept: OBGYN CLINIC | Facility: CLINIC | Age: 25
End: 2025-01-24
Payer: COMMERCIAL

## 2025-01-24 VITALS
DIASTOLIC BLOOD PRESSURE: 66 MMHG | HEIGHT: 68 IN | WEIGHT: 198.8 LBS | BODY MASS INDEX: 30.13 KG/M2 | SYSTOLIC BLOOD PRESSURE: 114 MMHG

## 2025-01-24 DIAGNOSIS — N91.2 AMENORRHEA: Primary | ICD-10-CM

## 2025-01-24 DIAGNOSIS — Z34.91 FIRST TRIMESTER PREGNANCY: ICD-10-CM

## 2025-01-24 PROCEDURE — 99213 OFFICE O/P EST LOW 20 MIN: CPT | Performed by: OBSTETRICS & GYNECOLOGY

## 2025-01-24 PROCEDURE — 76817 TRANSVAGINAL US OBSTETRIC: CPT | Performed by: OBSTETRICS & GYNECOLOGY

## 2025-01-24 NOTE — PROGRESS NOTES
Assessment  24 y.o.  presenting for amenorrhea. Pregnancy confirmed, dating consistent with LMP. CHAO 2025.    Plan  Diagnoses and all orders for this visit:    Amenorrhea  -     AMB US OB < 14 weeks single or first gestation level 1    First trimester pregnancy  - Prenatal vitamin  - Discussed and referred for genetic screening  - Prenatal Nursing Intake in 2 weeks  - Prenatal H&P in 4 weeks     ____________________________________________________________      Subjective   Allegra Camacho is a 24 y.o.  with an LMP of 24 (10w3d by LMP) who presents for pregnancy f/u in setting of history of miscarriage. Had early US that noted discrepancy in FP/GS size. She is currently otherwise without complaint. Notes fatigue and breast tenderness, manageable. Anxious to have this US to confirm viability with hx. Stopped adderall recently. Understands ok to continue, but would prefer to avoid in this pregnancy.    Patient notes that this pregnancy was planned. She was not using contraception at the time. She reports she is certain of her LMP and that she has regular menses. She has has no vaginal bleeding since her LMP.      The following portions of the patient's history were reviewed and updated as appropriate: allergies, current medications, past medical history, past social history, past surgical history, and problem list.    Review of Systems  Review of Systems   Constitutional:  Positive for fatigue. Negative for chills and fever.   Respiratory:  Negative for shortness of breath.    Cardiovascular:  Negative for chest pain and palpitations.   Gastrointestinal:  Negative for abdominal distention, abdominal pain, constipation, diarrhea, nausea and vomiting.   Genitourinary:  Positive for menstrual problem (amenorrhea). Negative for dyspareunia, dysuria, flank pain, frequency, pelvic pain, vaginal bleeding, vaginal discharge and vaginal pain.   Neurological:  Negative for dizziness and headaches.  "  Psychiatric/Behavioral:  The patient is nervous/anxious.           Objective  /66   Ht 5' 7.5\" (1.715 m)   Wt 90.2 kg (198 lb 12.8 oz)   LMP 11/12/2024 (Exact Date)   BMI 30.68 kg/m²       Physical Exam:  Physical Exam  Vitals reviewed. Exam conducted with a chaperone present.   Constitutional:       General: She is not in acute distress.     Appearance: Normal appearance. She is not ill-appearing, toxic-appearing or diaphoretic.   Eyes:      General: No scleral icterus.        Right eye: No discharge.         Left eye: No discharge.      Conjunctiva/sclera: Conjunctivae normal.   Cardiovascular:      Rate and Rhythm: Normal rate.   Pulmonary:      Effort: Pulmonary effort is normal. No respiratory distress.   Abdominal:      General: There is no distension.      Palpations: There is no mass.      Tenderness: There is no abdominal tenderness. There is no guarding or rebound.      Hernia: No hernia is present.   Genitourinary:     General: Normal vulva.      Exam position: Lithotomy position.      Labia:         Right: No rash, tenderness or lesion.         Left: No rash, tenderness or lesion.       Urethra: No prolapse, urethral swelling or urethral lesion.      Vagina: No bleeding.   Musculoskeletal:         General: No swelling.   Skin:     General: Skin is warm and dry.      Coloration: Skin is not jaundiced or pale.      Findings: No bruising or erythema.   Neurological:      Mental Status: She is alert.   Psychiatric:         Mood and Affect: Mood normal.         Behavior: Behavior normal.         Thought Content: Thought content normal.         Judgment: Judgment normal.           Reviewed  Hcg trend      "

## 2025-01-30 NOTE — PATIENT INSTRUCTIONS
Thank you for choosing us for your  care today.  If you have any questions about your ultrasound or care, please do not hesitate to contact us or your primary obstetrician.        Some general instructions for your pregnancy are:    Exercise: Aim for 150 minutes per week of regular exercise.  Walking is great!  Nutrition: Choose healthy sources of calcium, iron, and protein.  Avoid ultraprocessed foods and added sugar.  Learn about Preeclampsia: preeclampsia is a common, potentially serious high blood pressure complication in pregnancy.  A blood pressure of 140mmHg (systolic or top number) or 90mmHg (diastolic or bottom number) should be evaluated by your doctor.  Aspirin is sometimes prescribed in early pregnancy to prevent preeclampsia in women with risk factors - ask your obstetrician if you should be on this medication.  For more resources, visit:  https://www.highriskpregnancyinfo.org/preeclampsia  If you smoke, please try to quit completely but also try to reduce your smoking by as much as possible (as soon as possible).  Do not vape.  Please also avoid cannabis products.  Other warning signs to watch out for in pregnancy or postpartum: chest pain, obstructed breathing or shortness of breath, seizures, thoughts of hurting yourself or your baby, bleeding, a painful or swollen leg, fever, or headache (see [facility] Warning Signs campaign).  If these happen call 911.  Itching is also not normal in pregnancy and if you experience this, especially over your hands and feet, potentially worse at night, notify your doctors.     The use of low dose aspirin in pregnancy (162mg) is recommended in women with an increased risk for preeclampsia, and was recommended today for you.  When started early in pregnancy (ideally 12-16 weeks but can be started as late as 28 weeks), low dose aspirin can reduce your risk of preeclampsia.  Low dose aspirin has been shown to be safe and without significant maternal or  fetal risk.  Side effects are generally none to mild, however, if you experience what you think may be an allergic reaction after starting aspirin, immediately stop it and notify your doctor.  If you have asthma or acid reflux and notice an increase in symptom frequency or severity, consider stopping this medication, and notify your doctor.  If you have had bariatric surgery, you may need a different dose of medication with or without acid-reducing medication.  We advise routine discontinuation of this medication at 36 weeks.  For more resources, visit:  https://mothertobaby.org/fact-sheets/low-dose-aspirin/  https://www.preeclampsia.org/aspirin  https://www.highriskpregnancyinfo.org/preeclampsia

## 2025-02-04 ENCOUNTER — ROUTINE PRENATAL (OUTPATIENT)
Dept: PERINATAL CARE | Facility: OTHER | Age: 25
End: 2025-02-04
Payer: COMMERCIAL

## 2025-02-04 VITALS
WEIGHT: 193.2 LBS | BODY MASS INDEX: 29.28 KG/M2 | SYSTOLIC BLOOD PRESSURE: 124 MMHG | DIASTOLIC BLOOD PRESSURE: 70 MMHG | HEIGHT: 68 IN | HEART RATE: 94 BPM

## 2025-02-04 DIAGNOSIS — Z3A.12 12 WEEKS GESTATION OF PREGNANCY: ICD-10-CM

## 2025-02-04 DIAGNOSIS — E07.9 THYROID DISEASE AFFECTING PREGNANCY: ICD-10-CM

## 2025-02-04 DIAGNOSIS — O99.210 OBESITY IN PREGNANCY, ANTEPARTUM: ICD-10-CM

## 2025-02-04 DIAGNOSIS — Z36.0 ENCOUNTER FOR ANTENATAL SCREENING FOR CHROMOSOMAL ANOMALIES: ICD-10-CM

## 2025-02-04 DIAGNOSIS — Z34.91 FIRST TRIMESTER PREGNANCY: ICD-10-CM

## 2025-02-04 DIAGNOSIS — O99.840 PREVIOUS BARIATRIC SURGERY AFFECTING PREGNANCY, ANTEPARTUM: ICD-10-CM

## 2025-02-04 DIAGNOSIS — O99.280 THYROID DISEASE AFFECTING PREGNANCY: ICD-10-CM

## 2025-02-04 DIAGNOSIS — Z36.82 ENCOUNTER FOR (NT) NUCHAL TRANSLUCENCY SCAN: ICD-10-CM

## 2025-02-04 DIAGNOSIS — O09.899 SUPERVISION OF OTHER HIGH RISK PREGNANCY, ANTEPARTUM: Primary | ICD-10-CM

## 2025-02-04 PROCEDURE — 76801 OB US < 14 WKS SINGLE FETUS: CPT | Performed by: OBSTETRICS & GYNECOLOGY

## 2025-02-04 PROCEDURE — 99204 OFFICE O/P NEW MOD 45 MIN: CPT | Performed by: NURSE PRACTITIONER

## 2025-02-04 PROCEDURE — 76813 OB US NUCHAL MEAS 1 GEST: CPT | Performed by: OBSTETRICS & GYNECOLOGY

## 2025-02-04 PROCEDURE — 36415 COLL VENOUS BLD VENIPUNCTURE: CPT | Performed by: OBSTETRICS & GYNECOLOGY

## 2025-02-04 RX ORDER — ASPIRIN 81 MG/1
162 TABLET ORAL DAILY
Qty: 180 TABLET | Refills: 3 | Status: SHIPPED | OUTPATIENT
Start: 2025-02-04

## 2025-02-04 NOTE — PROGRESS NOTES
"[facility]: Allegra Camacho was seen today for nuchal translucency ultrasound.  See ultrasound report under \"OB Procedures\" tab.   Please don't hesitate to contact our office with any concerns or questions.  -Tatianna Bangura MD      "

## 2025-02-04 NOTE — PROGRESS NOTES
Patient chose to have [facility] [id number] Non-Invasive Prenatal Screen [id number] PLUS w/ SCA, WITH fetal sex.  Patient choose to be billed through insurance.     Patient given brochure and is aware [facility] will contact patient's insurance and coordinate coverage.  Provided [facility] contact information. General inquiries [phone], Cost estimates [phone] and [facility] Billing [phone]. Website [email or web address].     Blood collection tubes labeled with patient identifiers (name, medical record number, and date of birth).     Filled out [facility] order form. Patient chose to have blood drawn in our office at time of visit. NIPS was drawn from right arm with a straight needle by Delfina BRAY .      Maternal Fetal Medicine will have results in approximately 5-7 business days and will call patient or notify via [employer].  Patient aware viewing lab result online will reveal fetal sex if ordered.    Patient verbalized understanding of all instructions and no questions at this time.

## 2025-02-04 NOTE — PROGRESS NOTES
Ultrasound Probe Disinfection    A transvaginal ultrasound was performed.   Prior to use, disinfection was performed with High Level Disinfection Process ([employer]).  Probe serial number [id number] was used.    Vianney CORTEZ, NIKKI  02/04/25  2:30 PM

## 2025-02-05 NOTE — PATIENT INSTRUCTIONS
Congratulations on your pregnancy!  We thank you for allowing us to participate in your care.    NEXT STEPS    Go to the lab to have your prenatal blood work competed, if you have not already done so.  We ask that you have this blood work done prior to your first routine prenatal appointment.  There is a listing of [facility] and locations in your prenatal folder. You may also visit [email or web address] or call [phone].   Please be aware that some insurance companies may require you to go to a specific lab ([employer] or [employer]). You can verify this by contacting your insurance company.   If you have decided to be screened for CF and SMA genetic testing, these tests require prior authorization and scheduling.  Prior Authorization is not a guarantee of payment. There may be out of pocket expenses that includes copays, deductibles and or coinsurance for your individual plan.  Please call [phone] if our team has not contacted you in 7 business days.  A referral was placed to Maternal Fetal Medicine for an ultrasound and or genetic testing.  You may have already scheduled or have had this appointment.  If not, please call their office to schedule.  Based on the referral placed by our office, they will know how to schedule you appropriately.    The phone number for Maternal Fetal Medicine is [phone]. For additional detailed contact information for Maternal Fetal Medicine, please refer to the prenatal folder provided to you by our office.   Return to our office for your first routine prenatal visit.   Some offices have multiple locations. Always check the address of your appointment to ensure you are going to the correct office.   If you experience any problems or concerns, call the office directly.   Remember to only use [employer] for none urgent concerns or questions.  Our doctors deliver at [facility] in [place]. The address is provided below.     [facility]   [address]  [place] [address]    Click on the links below to review our Pregnancy Essential Guide.  [facility] Pregnancy Essentials Guide  [facility] ([email or web address])     Click on the link below to review [facility] Lab locations.  [facility] Lab Locations    [employer]oomi resource  Findhelp is a tool to connect you to community resources you may need.    Thank you,  Jammie NOLAN LPN  OB Nurse Navigator

## 2025-02-06 ENCOUNTER — INITIAL PRENATAL (OUTPATIENT)
Dept: OBGYN CLINIC | Facility: CLINIC | Age: 25
End: 2025-02-06

## 2025-02-06 VITALS
BODY MASS INDEX: 30.83 KG/M2 | SYSTOLIC BLOOD PRESSURE: 128 MMHG | WEIGHT: 196.4 LBS | HEIGHT: 67 IN | DIASTOLIC BLOOD PRESSURE: 84 MMHG

## 2025-02-06 DIAGNOSIS — E03.9 HYPOTHYROIDISM, UNSPECIFIED TYPE: ICD-10-CM

## 2025-02-06 DIAGNOSIS — Z34.81 ENCOUNTER FOR SUPERVISION OF NORMAL PREGNANCY IN MULTIGRAVIDA IN FIRST TRIMESTER: Primary | ICD-10-CM

## 2025-02-06 DIAGNOSIS — Z98.84 HISTORY OF BARIATRIC SURGERY: ICD-10-CM

## 2025-02-06 DIAGNOSIS — Z13.71 SCREENING FOR GENETIC DISEASE CARRIER STATUS: ICD-10-CM

## 2025-02-06 PROCEDURE — OBC

## 2025-02-06 NOTE — PROGRESS NOTES
"OB INTAKE INTERVIEW 2025    Patient is 24 y.o. who presents for OB intake at 12w2d.  She is accompanied by her significant other during this encounter.  The father of her baby (Sarabjit Nguyễn) is involved in the pregnancy and he is 25 years old.      Patient's last menstrual period was 2024 (exact date).  Ultrasound: Measured 10 weeks 1 days on 2025  Estimated Date of Delivery: 25 confirmed by dating ultrasound.    Signs/Symptoms of Pregnancy  Current pregnancy symptoms: breast tenderness and fatigue  Headaches no  Cramping no  Spotting no  PICA cravings no    Diabetes-  Body mass index is 30.53 kg/m².  If patient has 1 or more, please order early 1 hour GTT  History of GDM no  BMI >35 no  History of PCOS or current metformin use no  History of LGA/macrosomic infant (4000g/9lbs) no    If patient has 2 or more, please order early 1 hour GTT  BMI>30 YES  AMA no  First degree relative with type 2 diabetes YES-sister \"prediabetic\"  History of chronic HTN, hyperlipidemia, elevated A1C no  High risk race (, , ,  or ) no    Hypertension- if you answer yes to any of the following, please order baseline preeclampsia labs (cbc, comprehensive metabolic panel, urine protein creatinine ratio, uric acid)  History of of chronic HTN no  History of gestational HTN no  History of preeclampsia, eclampsia, or HELLP syndrome no  History of diabetes no  History of lupus,sjogrens syndrome, kidney disease no    Thyroid- if yes order TSH with reflex T4  History of thyroid disease YES-hypothyroidism    Bleeding Disorder or Hx of DVT-patient or first degree relative with history of. Order the following if not done previously.   (Factor V, antithrombin III, prothrombin gene mutation, protein C and S Ag, lupus anticoagulant, anticardiolipin, beta-2 glycoprotein)   no    OB/GYN-  History of abnormal pap smear-ASCUS normal HPV    Date of last pap smear " 2024  History of HPV no  History of Herpes/HSV no  History of other STI (gonorrhea, chlamydia, trich) no  History of prior  no  History of prior  no  History of  delivery prior to 36 weeks 6 days no  History of blood transfusion no  Ok for blood transfusion  YES    Substance screening-   History of tobacco use YES  Currently using tobacco no  Substance Use Screen Level:  N/A    MRSA Screening-   Does the pt have a hx of MRSA? no    Immunizations:  Influenza vaccine given this season: NO   History of Varicella or Vaccination: YES   Discussed Tdap vaccine:  YES  Discussed COVID Vaccine:  YES    Genetic/MFM-  Do you or your partner have a history of any of the following in yourselves or first degree relatives?  Cystic fibrosis no  Spinal muscular atrophy no  Hemoglobinopathy/Sickle Cell/Thalassemia no  Fragile X Intellectual Disability no    If yes, discuss Carrier Screening and recommend consultation with MFM/Genetic Counseling and place specific [facility] Referral for.    If no, discuss Carrier Screening being completed once in a lifetime as a standard of care lab test. Place orders for Cystic Fibrosis Gene Test (ZDY871) and Spinal Muscular Atrophy DNA ([id number]).  Patient was informed that prior authorization needs to be completed for these tests and this may take 7-10 business days.  Patient does not desire testing for Cystic Fibrosis and Spinal Muscular Atrophy.    Appointment for Nuchal Translucency Ultrasound at [facility] was done on 2025.    Interview education  [facility] Pregnancy Essentials Book reviewed, discussed and attached to their AVS:  YES    Nurse/Family Partnership-patient may qualify NO; referral placed No     Prenatal lab work scripts YES    Extra labs ordered: Hgb Fractionation [facility], 1 hour GTT, TSH, T4 free, and bariatric labs    Aspirin/Preeclampsia Screen    Risk Level Risk Factor Recommendation   LOW Prior Uncomplicated full-term delivery no No Aspirin recommendation         MODERATE Nulliparity YES Recommend low-dose aspirin if     BMI>30 YES 2 or more moderate risk factors    Family History Preeclampsia (mother/sister) no     35yr old or greater no     Black Race, Concern for SDOH/Low Socioeconomic no     IVF Pregnancy  no     Personal History Risks (low birth weight, prior adverse preg outcome, >10yr preg interval) no         HIGH History of Preeclampsia no Recommend low-dose aspirin if     Multifetal gestation no 1 or more high risk factors    Chronic HTN no     Type 1 or 2 Diabetes no     Renal Disease no     Autoimmune Disease  no      Contraindications to ASA therapy:  NSAID/ ASA allergy: YES-had gastric sleeve surgery  Nasal polyps: no  Asthma with history of ASA induced bronchospasm: no  Relative contraindications:  History of GI bleed: no  Active peptic ulcer disease: no  Severe hepatic dysfunction: no    Patient does meet recommendation to take ASA 162mg during this pregnancy from 12-36 wks to lower her risk of preeclampsia.   Was prescribed and given instructions by MFM to start LDASA.     Mental Health:  History of depression: no  History of anxiety: no  EPDS Screen:  Negative   Score:  3    Dental Exam:  Last dental exam within the past 6 months: Yes    The patient has a history now or in prior pregnancy notable for: thyroid disease and obesity.    Details that I feel the provider should be aware of: Patient has a history of bariatric surgery (gastric sleeve), ADHD, and hypothyroidism    PN1 visit scheduled. The patient was oriented to our practice and the navigator role.  Reviewed delivering physicians and [facility] for delivery. All questions were answered.    Interviewed by: BIN MANCUSO LPN

## 2025-02-08 LAB
CFDNA.FET/CFDNA.TOTAL SFR FETUS: NORMAL %
CITATION REF LAB TEST: NORMAL
FET 13+18+21+X+Y ANEUP PLAS.CFDNA: NEGATIVE
FET CHR 21 TS PLAS.CFDNA QL: NEGATIVE
FET CHR 21 TS PLAS.CFDNA QL: NEGATIVE
FET MS X RISK WBC.DNA+CFDNA QL: NOT DETECTED
FET SEX PLAS.CFDNA DOSAGE CFDNA: NORMAL
FET TS 13 RISK PLAS.CFDNA QL: NEGATIVE
FET X + Y ANEUP RISK PLAS.CFDNA SEQ-IMP: NOT DETECTED
GA EST FROM CONCEPTION DATE: NORMAL D
GESTATIONAL AGE > 9:: YES
LAB DIRECTOR NAME PROVIDER: NORMAL
LAB DIRECTOR NAME PROVIDER: NORMAL
LABORATORY COMMENT REPORT: NORMAL
LIMITATIONS OF THE TEST: NORMAL
NEGATIVE PREDICTIVE VALUE: NORMAL
PERFORMANCE CHARACTERISTICS: NORMAL
POSITIVE PREDICTIVE VALUE: NORMAL
REF LAB TEST METHOD: NORMAL
SL AMB NOTE:: NORMAL
TEST PERFORMANCE INFO SPEC: NORMAL

## 2025-02-10 ENCOUNTER — APPOINTMENT (OUTPATIENT)
Dept: LAB | Facility: CLINIC | Age: 25
End: 2025-02-10
Payer: COMMERCIAL

## 2025-02-10 ENCOUNTER — RESULTS FOLLOW-UP (OUTPATIENT)
Dept: PERINATAL CARE | Facility: OTHER | Age: 25
End: 2025-02-10

## 2025-02-10 DIAGNOSIS — E03.9 HYPOTHYROIDISM, UNSPECIFIED TYPE: ICD-10-CM

## 2025-02-10 DIAGNOSIS — Z98.84 HISTORY OF BARIATRIC SURGERY: ICD-10-CM

## 2025-02-10 DIAGNOSIS — Z34.81 ENCOUNTER FOR SUPERVISION OF NORMAL PREGNANCY IN MULTIGRAVIDA IN FIRST TRIMESTER: ICD-10-CM

## 2025-02-10 DIAGNOSIS — Z13.71 SCREENING FOR GENETIC DISEASE CARRIER STATUS: ICD-10-CM

## 2025-02-10 LAB
25(OH)D3 SERPL-MCNC: 29.9 NG/ML (ref 30–100)
ABO GROUP BLD: NORMAL
BASOPHILS # BLD AUTO: 0.04 THOUSANDS/ΜL (ref 0–0.1)
BASOPHILS NFR BLD AUTO: 1 % (ref 0–1)
BILIRUB UR QL STRIP: NEGATIVE
BLD GP AB SCN SERPL QL: NEGATIVE
CLARITY UR: CLEAR
COLOR UR: COLORLESS
EOSINOPHIL # BLD AUTO: 0.02 THOUSAND/ΜL (ref 0–0.61)
EOSINOPHIL NFR BLD AUTO: 0 % (ref 0–6)
ERYTHROCYTE [DISTWIDTH] IN BLOOD BY AUTOMATED COUNT: 13.2 % (ref 11.6–15.1)
EST. AVERAGE GLUCOSE BLD GHB EST-MCNC: 91 MG/DL
GLUCOSE 1H P 50 G GLC PO SERPL-MCNC: 133 MG/DL (ref 70–134)
GLUCOSE UR STRIP-MCNC: NEGATIVE MG/DL
HBA1C MFR BLD: 4.8 %
HCT VFR BLD AUTO: 37.9 % (ref 34.8–46.1)
HGB BLD-MCNC: 12.7 G/DL (ref 11.5–15.4)
HGB UR QL STRIP.AUTO: NEGATIVE
IMM GRANULOCYTES # BLD AUTO: 0.04 THOUSAND/UL (ref 0–0.2)
IMM GRANULOCYTES NFR BLD AUTO: 1 % (ref 0–2)
KETONES UR STRIP-MCNC: NEGATIVE MG/DL
LEUKOCYTE ESTERASE UR QL STRIP: NEGATIVE
LYMPHOCYTES # BLD AUTO: 1.34 THOUSANDS/ΜL (ref 0.6–4.47)
LYMPHOCYTES NFR BLD AUTO: 15 % (ref 14–44)
MCH RBC QN AUTO: 30.2 PG (ref 26.8–34.3)
MCHC RBC AUTO-ENTMCNC: 33.5 G/DL (ref 31.4–37.4)
MCV RBC AUTO: 90 FL (ref 82–98)
MONOCYTES # BLD AUTO: 0.34 THOUSAND/ΜL (ref 0.17–1.22)
MONOCYTES NFR BLD AUTO: 4 % (ref 4–12)
NEUTROPHILS # BLD AUTO: 7.01 THOUSANDS/ΜL (ref 1.85–7.62)
NEUTS SEG NFR BLD AUTO: 79 % (ref 43–75)
NITRITE UR QL STRIP: NEGATIVE
NRBC BLD AUTO-RTO: 0 /100 WBCS
PH UR STRIP.AUTO: 6.5 [PH]
PLATELET # BLD AUTO: 268 THOUSANDS/UL (ref 149–390)
PMV BLD AUTO: 10.6 FL (ref 8.9–12.7)
PROT UR STRIP-MCNC: NEGATIVE MG/DL
RBC # BLD AUTO: 4.21 MILLION/UL (ref 3.81–5.12)
RH BLD: POSITIVE
RUBV IGG SERPL IA-ACNC: 18.1 IU/ML
SP GR UR STRIP.AUTO: 1.01 (ref 1–1.03)
SPECIMEN EXPIRATION DATE: NORMAL
TSH SERPL DL<=0.05 MIU/L-ACNC: 1.81 UIU/ML (ref 0.45–4.5)
UROBILINOGEN UR STRIP-ACNC: <2 MG/DL
VIT B12 SERPL-MCNC: 207 PG/ML (ref 180–914)
WBC # BLD AUTO: 8.79 THOUSAND/UL (ref 4.31–10.16)

## 2025-02-10 PROCEDURE — 86901 BLOOD TYPING SEROLOGIC RH(D): CPT

## 2025-02-10 PROCEDURE — 83036 HEMOGLOBIN GLYCOSYLATED A1C: CPT

## 2025-02-10 PROCEDURE — 82607 VITAMIN B-12: CPT

## 2025-02-10 PROCEDURE — 81003 URINALYSIS AUTO W/O SCOPE: CPT

## 2025-02-10 PROCEDURE — 36415 COLL VENOUS BLD VENIPUNCTURE: CPT

## 2025-02-10 PROCEDURE — 87086 URINE CULTURE/COLONY COUNT: CPT

## 2025-02-10 PROCEDURE — 86762 RUBELLA ANTIBODY: CPT

## 2025-02-10 PROCEDURE — 86803 HEPATITIS C AB TEST: CPT

## 2025-02-10 PROCEDURE — 87340 HEPATITIS B SURFACE AG IA: CPT

## 2025-02-10 PROCEDURE — 86850 RBC ANTIBODY SCREEN: CPT

## 2025-02-10 PROCEDURE — 86706 HEP B SURFACE ANTIBODY: CPT

## 2025-02-10 PROCEDURE — 84425 ASSAY OF VITAMIN B-1: CPT

## 2025-02-10 PROCEDURE — 82306 VITAMIN D 25 HYDROXY: CPT

## 2025-02-10 PROCEDURE — 85025 COMPLETE CBC W/AUTO DIFF WBC: CPT

## 2025-02-10 PROCEDURE — 84630 ASSAY OF ZINC: CPT

## 2025-02-10 PROCEDURE — 84590 ASSAY OF VITAMIN A: CPT

## 2025-02-10 PROCEDURE — 84443 ASSAY THYROID STIM HORMONE: CPT

## 2025-02-10 PROCEDURE — 86900 BLOOD TYPING SEROLOGIC ABO: CPT

## 2025-02-10 PROCEDURE — 86780 TREPONEMA PALLIDUM: CPT

## 2025-02-10 PROCEDURE — 82950 GLUCOSE TEST: CPT

## 2025-02-10 PROCEDURE — 83020 HEMOGLOBIN ELECTROPHORESIS: CPT

## 2025-02-10 PROCEDURE — 87389 HIV-1 AG W/HIV-1&-2 AB AG IA: CPT

## 2025-02-11 LAB
HBV SURFACE AB SER-ACNC: 22.6 MIU/ML
HBV SURFACE AG SER QL: NORMAL
HCV AB SER QL: NORMAL
HIV 1+2 AB+HIV1 P24 AG SERPL QL IA: NORMAL
HIV 2 AB SERPL QL IA: NORMAL
HIV1 AB SERPL QL IA: NORMAL
HIV1 P24 AG SERPL QL IA: NORMAL
TREPONEMA PALLIDUM IGG+IGM AB [PRESENCE] IN SERUM OR PLASMA BY IMMUNOASSAY: NORMAL

## 2025-02-12 LAB
BACTERIA UR CULT: ABNORMAL
HGB A MFR BLD: 2.5 % (ref 1.8–3.2)
HGB A MFR BLD: 97.5 % (ref 96.4–98.8)
HGB F MFR BLD: 0 % (ref 0–2)
HGB FRACT BLD-IMP: NORMAL
HGB S MFR BLD: 0 %

## 2025-02-13 ENCOUNTER — RESULTS FOLLOW-UP (OUTPATIENT)
Dept: OBGYN CLINIC | Facility: CLINIC | Age: 25
End: 2025-02-13

## 2025-02-13 LAB — ZINC SERPL-MCNC: 81 UG/DL (ref 44–115)

## 2025-02-15 LAB — VIT B1 BLD-SCNC: 116.9 NMOL/L (ref 66.5–200)

## 2025-02-17 LAB — VIT A SERPL-MCNC: 28.6 UG/DL (ref 18.9–57.3)

## 2025-02-19 ENCOUNTER — TELEPHONE (OUTPATIENT)
Age: 25
End: 2025-02-19

## 2025-02-19 NOTE — TELEPHONE ENCOUNTER
Patient called in and stated that her OBGYN wants her to go on the lowest dose of Adderall.     Please call patient back and advise if this can be done  and advise.    Thank you.

## 2025-02-21 DIAGNOSIS — F90.0 ATTENTION DEFICIT HYPERACTIVITY DISORDER (ADHD), PREDOMINANTLY INATTENTIVE TYPE: ICD-10-CM

## 2025-02-21 RX ORDER — DEXTROAMPHETAMINE SACCHARATE, AMPHETAMINE ASPARTATE MONOHYDRATE, DEXTROAMPHETAMINE SULFATE AND AMPHETAMINE SULFATE 7.5; 7.5; 7.5; 7.5 MG/1; MG/1; MG/1; MG/1
CAPSULE, EXTENDED RELEASE ORAL
Qty: 30 CAPSULE | Refills: 0 | Status: SHIPPED | OUTPATIENT
Start: 2025-02-21 | End: 2025-02-21

## 2025-02-21 RX ORDER — DEXTROAMPHETAMINE SACCHARATE, AMPHETAMINE ASPARTATE MONOHYDRATE, DEXTROAMPHETAMINE SULFATE AND AMPHETAMINE SULFATE 7.5; 7.5; 7.5; 7.5 MG/1; MG/1; MG/1; MG/1
CAPSULE, EXTENDED RELEASE ORAL
Qty: 5 CAPSULE | Refills: 0 | Status: SHIPPED | OUTPATIENT
Start: 2025-02-21

## 2025-02-21 RX ORDER — DEXTROAMPHETAMINE SACCHARATE, AMPHETAMINE ASPARTATE MONOHYDRATE, DEXTROAMPHETAMINE SULFATE AND AMPHETAMINE SULFATE 2.5; 2.5; 2.5; 2.5 MG/1; MG/1; MG/1; MG/1
CAPSULE, EXTENDED RELEASE ORAL
Qty: 60 CAPSULE | Refills: 0 | Status: SHIPPED | OUTPATIENT
Start: 2025-02-21

## 2025-02-21 NOTE — TELEPHONE ENCOUNTER
Call placed to pt. Pt understood. Also sent pt a MYC message with the weaning schedule so that she can refer back to it.

## 2025-02-21 NOTE — TELEPHONE ENCOUNTER
Please let pt know that she will need to wean off the 30mg capsules as follows:  take one 30mg capsule every other day alternating with 20mg dose (two 10mg capsules) every other day for 10 days,   then alternate 20mg (two 10mg capsules) with 10mg (one 10mg capsule) every other day for 10 days,   then take 10mg QD  I escribed 10mg capsules and #5 of the 30mg capsules just in case she needs a few more 30mg caps to follow the weaning schedule

## 2025-02-24 ENCOUNTER — TELEPHONE (OUTPATIENT)
Age: 25
End: 2025-02-24

## 2025-02-24 NOTE — TELEPHONE ENCOUNTER
Called pharmacy PRIMARY INSURANCE IS APPROVED thru [employer]    SECONDARY INSURANCE NEEDS APPROVAL:     BIN [id number]  [facility] [id number]  [facility] [id number]  ID [id number]  PHONE NUMBER OF INSURANCE [phone]  NAME OF INSURANCE [facility]

## 2025-02-24 NOTE — TELEPHONE ENCOUNTER
PA for ADDERAL XR 30MG SUBMITTED to [employer]    via    [x]CMM-KEY: [id number]      [x]PA sent as URGENT    All office notes, labs and other pertaining documents and studies sent. Clinical questions answered. Awaiting determination from insurance company.     Turnaround time for your insurance to make a decision on your Prior Authorization can take 7-21 business days.

## 2025-02-25 NOTE — TELEPHONE ENCOUNTER
PA for ADDERAL XR 30MG  CANCELLED due to       [x]Patient has other coverage        Message sent to office clinical pool   Yes    Scanned into Media  yes

## 2025-03-03 ENCOUNTER — CLINICAL SUPPORT (OUTPATIENT)
Age: 25
End: 2025-03-03

## 2025-03-03 DIAGNOSIS — Z32.2 ENCOUNTER FOR CHILDBIRTH INSTRUCTION: Primary | ICD-10-CM

## 2025-03-07 ENCOUNTER — INITIAL PRENATAL (OUTPATIENT)
Dept: OBGYN CLINIC | Facility: CLINIC | Age: 25
End: 2025-03-07

## 2025-03-07 VITALS — SYSTOLIC BLOOD PRESSURE: 110 MMHG | DIASTOLIC BLOOD PRESSURE: 70 MMHG | BODY MASS INDEX: 30.66 KG/M2 | WEIGHT: 197.2 LBS

## 2025-03-07 DIAGNOSIS — E03.9 ACQUIRED HYPOTHYROIDISM: ICD-10-CM

## 2025-03-07 DIAGNOSIS — Z3A.16 16 WEEKS GESTATION OF PREGNANCY: ICD-10-CM

## 2025-03-07 DIAGNOSIS — Z34.92 SECOND TRIMESTER PREGNANCY: Primary | ICD-10-CM

## 2025-03-07 DIAGNOSIS — Z98.84 HISTORY OF BARIATRIC SURGERY: ICD-10-CM

## 2025-03-07 PROCEDURE — 87491 CHLMYD TRACH DNA AMP PROBE: CPT | Performed by: OBSTETRICS & GYNECOLOGY

## 2025-03-07 PROCEDURE — 87591 N.GONORRHOEAE DNA AMP PROB: CPT | Performed by: OBSTETRICS & GYNECOLOGY

## 2025-03-07 PROCEDURE — PNV: Performed by: OBSTETRICS & GYNECOLOGY

## 2025-03-07 NOTE — PROGRESS NOTES
Assessment  24 y.o.  at 16w3d presenting for initial prenatal visit.     Plan  Diagnoses and all orders for this visit:    Second trimester pregnancy  16 weeks gestation of pregnancy  - Discussed delivering providers, anticipated PN course/visit schedule  - Follows with MFM  - AFP ordered  - Reviewed early pregnancy precautions  - GC collected  - Return in 4wks for prenatal     Acquired hypothyroidism  - TSH monitoring ongoing  - Continue levothyroxine    History of bariatric surgery  - Reviewed nutritional monitoring  - Follows with MFM    ____________________________________________________________        Subjective    Allegra Camacho is a 24 y.o.  at 16w3d who presents for routine prenatal visit. She is without complaint.     Patient's PMH is complicated by hypothyroidism, ADHD, hx gastric bypass. Her prior pregnancies have been complicated by SAB. She denies a hx of STD/STI, denies a hx of TB or close contacts with persons with TB. FOBs mom with MS. She otherwise denies a family history of inheritable conditions such as physical or intellectual disabilities, birth defects, blood disorders, heart or neural tube defects. She has never had MRSA. She denies recent travel or travel planned in the near future.     Pregnancy Problems:  Patient Active Problem List   Diagnosis    ADHD (attention deficit hyperactivity disorder)    BMI 27.0-27.9,adult    Acquired hypothyroidism    BRUCE (obstructive sleep apnea)    Encounter for surgical aftercare following surgery of digestive system    Postsurgical malabsorption    Long-term current use of stimulant    History of bariatric surgery    Controlled substance agreement signed    Osteochondral lesion    Miscarriage       Objective  /70   Wt 89.4 kg (197 lb 3.2 oz)   LMP 2024 (Exact Date)   BMI 30.66 kg/m²     FHT: 150 BPM     Physical Exam:  Physical Exam  Constitutional:       General: She is not in acute distress.     Appearance: Normal appearance.  She is well-developed. She is not ill-appearing, toxic-appearing or diaphoretic.   HENT:      Head: Normocephalic and atraumatic.   Eyes:      General: No scleral icterus.        Right eye: No discharge.         Left eye: No discharge.      Conjunctiva/sclera: Conjunctivae normal.   Pulmonary:      Effort: Pulmonary effort is normal. No accessory muscle usage or respiratory distress.   Abdominal:      General: There is distension (gravid).      Tenderness: There is no abdominal tenderness. There is no guarding or rebound.   Skin:     General: Skin is warm and dry.      Coloration: Skin is not jaundiced.      Findings: No bruising, erythema or rash.   Neurological:      Mental Status: She is alert.   Psychiatric:         Mood and Affect: Mood normal.         Behavior: Behavior normal.         Thought Content: Thought content normal.         Judgment: Judgment normal.         Reviewed:  PN panel  1hr glucose  Nutrition eval  NIPT

## 2025-03-10 LAB
C TRACH DNA SPEC QL NAA+PROBE: NEGATIVE
N GONORRHOEA DNA SPEC QL NAA+PROBE: NEGATIVE

## 2025-03-12 ENCOUNTER — TELEPHONE (OUTPATIENT)
Dept: OBGYN CLINIC | Facility: CLINIC | Age: 25
End: 2025-03-12

## 2025-03-12 NOTE — TELEPHONE ENCOUNTER
Called patient for second trimester assessment.  Left message on voicemail and sent follow up PushToTesthart message.

## 2025-03-31 ENCOUNTER — APPOINTMENT (OUTPATIENT)
Dept: LAB | Facility: CLINIC | Age: 25
End: 2025-03-31
Payer: COMMERCIAL

## 2025-03-31 DIAGNOSIS — Z34.92 SECOND TRIMESTER PREGNANCY: ICD-10-CM

## 2025-03-31 PROCEDURE — 82105 ALPHA-FETOPROTEIN SERUM: CPT

## 2025-03-31 PROCEDURE — 36415 COLL VENOUS BLD VENIPUNCTURE: CPT

## 2025-04-02 ENCOUNTER — ROUTINE PRENATAL (OUTPATIENT)
Dept: OBGYN CLINIC | Facility: CLINIC | Age: 25
End: 2025-04-02

## 2025-04-02 VITALS — SYSTOLIC BLOOD PRESSURE: 120 MMHG | DIASTOLIC BLOOD PRESSURE: 70 MMHG | WEIGHT: 210 LBS | BODY MASS INDEX: 32.65 KG/M2

## 2025-04-02 DIAGNOSIS — F90.8 OTHER SPECIFIED ATTENTION DEFICIT HYPERACTIVITY DISORDER (ADHD): ICD-10-CM

## 2025-04-02 DIAGNOSIS — E03.9 HYPOTHYROID IN PREGNANCY, ANTEPARTUM: ICD-10-CM

## 2025-04-02 DIAGNOSIS — O99.280 HYPOTHYROID IN PREGNANCY, ANTEPARTUM: ICD-10-CM

## 2025-04-02 DIAGNOSIS — O99.843 BARIATRIC SURGERY STATUS COMPLICATING PREGNANCY, THIRD TRIMESTER: ICD-10-CM

## 2025-04-02 DIAGNOSIS — Z3A.20 20 WEEKS GESTATION OF PREGNANCY: Primary | ICD-10-CM

## 2025-04-02 LAB
2ND TRIMESTER 4 SCREEN SERPL-IMP: NORMAL
AFP ADJ MOM SERPL: 1.02
AFP INTERP AMN-IMP: NORMAL
AFP INTERP SERPL-IMP: NORMAL
AFP INTERP SERPL-IMP: NORMAL
AFP SERPL-MCNC: 48.1 NG/ML
AGE AT DELIVERY: 26.4 YR
GA METHOD: NORMAL
GA: 19.9 WEEKS
IDDM PATIENT QL: NO
MULTIPLE PREGNANCY: NO
NEURAL TUBE DEFECT RISK FETUS: NORMAL %

## 2025-04-02 PROCEDURE — PNV: Performed by: OBSTETRICS & GYNECOLOGY

## 2025-04-02 NOTE — PROGRESS NOTES
Routine Prenatal Visit  [facility] [address] Maegan Hughes PA      OB/GYN Prenatal Visit    ASSESSMENT / PLAN:  1. 20 weeks gestation of pregnancy  Assessment & Plan:  Level 2 25  NIPT and AFP is normal     Early GTT is normal 133  Repeat at 28 weeks   2. Hypothyroid in pregnancy, antepartum  Assessment & Plan:  Lab Results   Component Value Date    [id number] 1.808 02/10/2025     Cont meds.   3. Other specified attention deficit hyperactivity disorder (ADHD)  Assessment & Plan:  Currently on adderall   Explained the stimulant for the pregnancy and the risk of withdrawal for the baby    4. Bariatric surgery status complicating pregnancy, third trimester  Assessment & Plan:  Check vitamins every trimester         SUBJECTIVE:  Allegra Camacho is a 25 y.o.  at 20 here for prenatal visit.  She has no obstetric complaints and denies pelvic pain, cramping/contractions, vaginal bleeding, loss of fluid, or decreased fetal movement.       The following portions of the patient's history were reviewed and updated as appropriate: allergies, current medications, past family history, past medical history, obstetric history, gynecologic history, past social history, past surgical history and problem list.      Objective:  /70   Wt 95.3 kg (210 lb)   LMP 2024 (Exact Date)   BMI 32.65 kg/m²   Pregravid Weight/BMI: 89.8 kg (198 lb) (BMI 30.79)  Current Weight: 95.3 kg (210 lb)   Total Weight Gain: 5.443 kg (12 lb)   Pre-Ginger Vitals      Flowsheet Row Most Recent Value   Prenatal Assessment    Fetal Heart Rate 146   Movement Present   Prenatal Vitals    Blood Pressure 120/70   Weight - Scale 95.3 kg (210 lb)   Urine Albumin/Glucose    Dilation/Effacement/Station    Vaginal Drainage    Draining Fluid No   Edema    LLE Edema None   RLE Edema None   Facial Edema None               Physical Exam:    General: Well appearing, no distress  Respiratory: Unlabored  breathing  Cardiovascular: Regular rate.  Abdomen: Soft, gravid, nontender  Fundal Height: Appropriate for gestational age.  Extremities: Warm and well perfused.  Non tender.      Jose Adames MD

## 2025-04-02 NOTE — ASSESSMENT & PLAN NOTE
Currently on adderall   Explained the stimulant for the pregnancy and the risk of withdrawal for the baby

## 2025-04-03 ENCOUNTER — ROUTINE PRENATAL (OUTPATIENT)
Dept: PERINATAL CARE | Facility: OTHER | Age: 25
End: 2025-04-03
Payer: COMMERCIAL

## 2025-04-03 VITALS
HEART RATE: 84 BPM | DIASTOLIC BLOOD PRESSURE: 70 MMHG | SYSTOLIC BLOOD PRESSURE: 110 MMHG | WEIGHT: 210.8 LBS | BODY MASS INDEX: 33.09 KG/M2 | HEIGHT: 67 IN

## 2025-04-03 DIAGNOSIS — O99.280 THYROID DISEASE AFFECTING PREGNANCY: ICD-10-CM

## 2025-04-03 DIAGNOSIS — Z3A.20 20 WEEKS GESTATION OF PREGNANCY: Primary | ICD-10-CM

## 2025-04-03 DIAGNOSIS — Z36.3 ENCOUNTER FOR ANTENATAL SCREENING FOR MALFORMATION USING ULTRASOUND: ICD-10-CM

## 2025-04-03 DIAGNOSIS — Z36.86 ENCOUNTER FOR ANTENATAL SCREENING FOR CERVICAL LENGTH: ICD-10-CM

## 2025-04-03 DIAGNOSIS — O99.210 OBESITY IN PREGNANCY, ANTEPARTUM: ICD-10-CM

## 2025-04-03 DIAGNOSIS — E07.9 THYROID DISEASE AFFECTING PREGNANCY: ICD-10-CM

## 2025-04-03 DIAGNOSIS — O99.840 PREVIOUS BARIATRIC SURGERY AFFECTING PREGNANCY, ANTEPARTUM: ICD-10-CM

## 2025-04-03 PROCEDURE — 76811 OB US DETAILED SNGL FETUS: CPT | Performed by: OBSTETRICS & GYNECOLOGY

## 2025-04-03 PROCEDURE — 99213 OFFICE O/P EST LOW 20 MIN: CPT | Performed by: OBSTETRICS & GYNECOLOGY

## 2025-04-03 PROCEDURE — 76817 TRANSVAGINAL US OBSTETRIC: CPT | Performed by: OBSTETRICS & GYNECOLOGY

## 2025-04-03 NOTE — PROGRESS NOTES
Ultrasound Probe Disinfection    A transvaginal ultrasound was performed.   Prior to use, disinfection was performed with High Level Disinfection Process ([employer]).  Probe serial number [id number]: [id number]  was used.    Chaperone: Rhea Martin RDMS

## 2025-04-03 NOTE — PROGRESS NOTES
"[facility]: Allegra Camacho was seen today for anatomic survey and cervical length screening ultrasound.  See ultrasound report under \"OB Procedures\" tab.   Please don't hesitate to contact our office with any concerns or questions.  -Tatianna Bangura MD      "

## 2025-04-15 DIAGNOSIS — F90.0 ATTENTION DEFICIT HYPERACTIVITY DISORDER (ADHD), PREDOMINANTLY INATTENTIVE TYPE: ICD-10-CM

## 2025-04-16 NOTE — TELEPHONE ENCOUNTER
amphetamine-dextroamphetamine (ADDERALL XR, 10MG,) 10 MG 24 hr capsule         Sig: Weaning off 30mg capsules: take one 30mg capsule every other day alternating with 20mg dose (two 10mg capsules) every other day for 10 days, then alternate 20mg (two 10mg capsules) with 10mg every other day for 10 days, then take 10mg QD    Disp: 60 capsule    Refills: 0    Start: 4/15/2025    Earliest Fill Date: 4/15/2025    Class: Normal    PDMP Needs Review    Non-formulary For: Attention deficit hyperactivity disorder (ADHD), predominantly inattentive type    Last ordered: 1 month ago (2/21/2025) by Malathi Lambert DO    Psychiatry:  Stimulants/ADHD Ysmrgi72/15/2025 11:19 AM   Protocol Details This refill cannot be delegated    Valid encounter within last 6 months      To be filled at: [id number] - [place] - [address] [facility]

## 2025-04-18 ENCOUNTER — TELEPHONE (OUTPATIENT)
Age: 25
End: 2025-04-18

## 2025-04-18 RX ORDER — DEXTROAMPHETAMINE SACCHARATE, AMPHETAMINE ASPARTATE MONOHYDRATE, DEXTROAMPHETAMINE SULFATE AND AMPHETAMINE SULFATE 2.5; 2.5; 2.5; 2.5 MG/1; MG/1; MG/1; MG/1
10 CAPSULE, EXTENDED RELEASE ORAL EVERY MORNING
Qty: 14 CAPSULE | Refills: 0 | Status: SHIPPED | OUTPATIENT
Start: 2025-04-18

## 2025-04-18 NOTE — TELEPHONE ENCOUNTER
Courtesy partial refill  Pt missed her appt here today for Annual physical + f/u  Please call pt to reschedule Annual physical + f/u

## 2025-04-18 NOTE — TELEPHONE ENCOUNTER
PA for amphetamine-dextroamphetamine 10MG 24hr capsule SUBMITTED to     via    []CMM-KEY:   [x]Surescripts-Case ID #   []Availity-Auth ID # NDC #   []Faxed to plan   []Other website   []Phone call Case ID #     [x]PA sent as URGENT    All office notes, labs and other pertaining documents and studies sent. Clinical questions answered. Awaiting determination from insurance company.     Turnaround time for your insurance to make a decision on your Prior Authorization can take 7-21 business days.

## 2025-04-18 NOTE — TELEPHONE ENCOUNTER
PA for amphetamine-dextroamphetamine 10MG 24hr capsule CANCELLED due to     []Approval on file-dates approved   []Medication already on Formulary  []Brand Name Preferred  []Patient no longer covered by insurance  [x]Alternative benefits

## 2025-04-22 NOTE — TELEPHONE ENCOUNTER
Patient called and wanted to know what the next steps would be to get the medication. She stated the primary insurance was cancelled because she turned 25. She would like a call back to discuss.

## 2025-04-25 NOTE — TELEPHONE ENCOUNTER
PA for amphetamine-dextroamphetamine 10MG 24hr capsule CANCELLED due to         Scanned into Media  yes

## 2025-04-25 NOTE — TELEPHONE ENCOUNTER
Patient calling for update on prescription for amphetamine-dextroamphetamine (ADDERALL XR, 10MG,) 10 MG 24 hr capsule. Informed patient that we received message that her primary insurance would need to be billed before [employer]. Patient states that she only has [employer] since turning 25 years old in March 2025. Previous Insurance under father has been terminated. Please contact patient once reviewed, [phone].

## 2025-04-25 NOTE — TELEPHONE ENCOUNTER
PA for amphetamine-dextroamphetamine 10MG 24hr capsule SUBMITTED to [employer]    Via    [x]CMM-KEY: [id number]  []Surescripts-Case ID #   []Availity-Auth ID # NDC #   []Faxed to plan   []Other website   []Phone call Case ID #     [x]PA sent as URGENT    All office notes, labs and other pertaining documents and studies sent. Clinical questions answered. Awaiting determination from insurance company.     Turnaround time for your insurance to make a decision on your Prior Authorization can take 7-21 business days.

## 2025-04-30 ENCOUNTER — HOSPITAL ENCOUNTER (OUTPATIENT)
Facility: HOSPITAL | Age: 25
Discharge: HOME | End: 2025-04-30
Attending: OBSTETRICS & GYNECOLOGY | Admitting: OBSTETRICS & GYNECOLOGY
Payer: COMMERCIAL

## 2025-04-30 ENCOUNTER — NURSE TRIAGE (OUTPATIENT)
Age: 25
End: 2025-04-30

## 2025-04-30 VITALS
RESPIRATION RATE: 18 BRPM | HEART RATE: 81 BPM | DIASTOLIC BLOOD PRESSURE: 61 MMHG | TEMPERATURE: 98.6 F | SYSTOLIC BLOOD PRESSURE: 118 MMHG

## 2025-04-30 PROBLEM — O16.2 ELEVATED BLOOD PRESSURE AFFECTING PREGNANCY IN SECOND TRIMESTER, ANTEPARTUM: Status: ACTIVE | Noted: 2025-04-30

## 2025-04-30 LAB
ALBUMIN SERPL BCG-MCNC: 3.5 G/DL (ref 3.5–5)
ALP SERPL-CCNC: 35 U/L (ref 34–104)
ALT SERPL W P-5'-P-CCNC: 8 U/L (ref 7–52)
ANION GAP SERPL CALCULATED.3IONS-SCNC: 6 MMOL/L (ref 4–13)
AST SERPL W P-5'-P-CCNC: 11 U/L (ref 13–39)
BASOPHILS # BLD AUTO: 0.03 THOUSANDS/ÂΜL (ref 0–0.1)
BASOPHILS NFR BLD AUTO: 0 % (ref 0–1)
BILIRUB SERPL-MCNC: 0.27 MG/DL (ref 0.2–1)
BUN SERPL-MCNC: 11 MG/DL (ref 5–25)
CALCIUM SERPL-MCNC: 8.5 MG/DL (ref 8.4–10.2)
CHLORIDE SERPL-SCNC: 109 MMOL/L (ref 96–108)
CO2 SERPL-SCNC: 22 MMOL/L (ref 21–32)
CREAT SERPL-MCNC: 0.65 MG/DL (ref 0.6–1.3)
CREAT UR-MCNC: 150.8 MG/DL
EOSINOPHIL # BLD AUTO: 0.08 THOUSAND/ÂΜL (ref 0–0.61)
EOSINOPHIL NFR BLD AUTO: 1 % (ref 0–6)
ERYTHROCYTE [DISTWIDTH] IN BLOOD BY AUTOMATED COUNT: 12.2 % (ref 11.6–15.1)
GFR SERPL CREATININE-BSD FRML MDRD: 122 ML/MIN/1.73SQ M
GLUCOSE SERPL-MCNC: 87 MG/DL (ref 65–140)
HCT VFR BLD AUTO: 31.7 % (ref 34.8–46.1)
HGB BLD-MCNC: 10.2 G/DL (ref 11.5–15.4)
IMM GRANULOCYTES # BLD AUTO: 0.03 THOUSAND/UL (ref 0–0.2)
IMM GRANULOCYTES NFR BLD AUTO: 0 % (ref 0–2)
LYMPHOCYTES # BLD AUTO: 1.11 THOUSANDS/ÂΜL (ref 0.6–4.47)
LYMPHOCYTES NFR BLD AUTO: 12 % (ref 14–44)
MCH RBC QN AUTO: 30.1 PG (ref 26.8–34.3)
MCHC RBC AUTO-ENTMCNC: 32.2 G/DL (ref 31.4–37.4)
MCV RBC AUTO: 94 FL (ref 82–98)
MONOCYTES # BLD AUTO: 0.47 THOUSAND/ÂΜL (ref 0.17–1.22)
MONOCYTES NFR BLD AUTO: 5 % (ref 4–12)
NEUTROPHILS # BLD AUTO: 7.74 THOUSANDS/ÂΜL (ref 1.85–7.62)
NEUTS SEG NFR BLD AUTO: 82 % (ref 43–75)
NRBC BLD AUTO-RTO: 0 /100 WBCS
PLATELET # BLD AUTO: 247 THOUSANDS/UL (ref 149–390)
PMV BLD AUTO: 9.7 FL (ref 8.9–12.7)
POTASSIUM SERPL-SCNC: 3.9 MMOL/L (ref 3.5–5.3)
PROT SERPL-MCNC: 6.3 G/DL (ref 6.4–8.4)
PROT UR-MCNC: 15.4 MG/DL
PROT/CREAT UR: 0.1 MG/G{CREAT}
RBC # BLD AUTO: 3.39 MILLION/UL (ref 3.81–5.12)
SODIUM SERPL-SCNC: 137 MMOL/L (ref 135–147)
WBC # BLD AUTO: 9.46 THOUSAND/UL (ref 4.31–10.16)

## 2025-04-30 PROCEDURE — 85025 COMPLETE CBC W/AUTO DIFF WBC: CPT | Performed by: OBSTETRICS & GYNECOLOGY

## 2025-04-30 PROCEDURE — 80053 COMPREHEN METABOLIC PANEL: CPT | Performed by: OBSTETRICS & GYNECOLOGY

## 2025-04-30 PROCEDURE — 99204 OFFICE O/P NEW MOD 45 MIN: CPT

## 2025-04-30 PROCEDURE — 99212 OFFICE O/P EST SF 10 MIN: CPT | Performed by: OBSTETRICS & GYNECOLOGY

## 2025-04-30 PROCEDURE — 82570 ASSAY OF URINE CREATININE: CPT | Performed by: OBSTETRICS & GYNECOLOGY

## 2025-04-30 PROCEDURE — 84156 ASSAY OF PROTEIN URINE: CPT | Performed by: OBSTETRICS & GYNECOLOGY

## 2025-04-30 RX ADMIN — SODIUM CHLORIDE, SODIUM LACTATE, POTASSIUM CHLORIDE, AND CALCIUM CHLORIDE 1000 ML: .6; .31; .03; .02 INJECTION, SOLUTION INTRAVENOUS at 12:39

## 2025-04-30 NOTE — ASSESSMENT & PLAN NOTE
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  She denies any hx of previously elevated BP  BP on admission 138/80; denies any headaches, changes in vision, SOB, chest pain, RUQ or epigastric pain  Denies contractions, loss of fluid, vaginal bleeding, or decreased fetal movement  Will obtain CBC, CMP, urine p/c ratio  Will also administer IV fluids

## 2025-04-30 NOTE — TELEPHONE ENCOUNTER
"FOLLOW UP: message to on call provider- BACILIO Jaramillo- needs to be seen in L&D triage for eval    Called patient back, she will go to [facility] now.    REASON FOR CONVERSATION: Dizziness    SYMPTOMS: dizziness/lightheadedness-new/sudden onset    OTHER: 24w1d calling in, complaining of new onset dizziness.  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  She reports she took her BP and it was 142/91 and 146/96, HR was 100.  She has not had any fluids/food today except a frozen hot chocolate drink.  Denies OB complaints, headache, vision changes and swelling.  +FM.    Advised drinking water/clear fluids and having a snack with protein to improve hydration and potential low glucose.  If she is still feeling same symptoms after 2 hours she should call back.    Next office visit is Friday, 5/2.      DISPOSITION: Discuss with Provider and Call Back Patient, Home Care      Reason for Disposition   Dizziness caused by not drinking enough fluids    Answer Assessment - Initial Assessment Questions  1. DESCRIPTION: \"Describe your dizziness.\"      Dizzy   2. LIGHTHEADED: \"Do you feel lightheaded?\" (e.g., somewhat faint, woozy, weak upon standing)      woozy  3. VERTIGO: \"Do you feel like either you or the room is spinning or tilting?\" (i.e., vertigo)      no  4. SEVERITY: \"How bad is it?\"  \"Do you feel like you are going to faint?\" \"Can you stand and walk?\"       5. ONSET:  \"When did the dizziness begin?\"      10 min ago  6. AGGRAVATING FACTORS: \"Does anything make it worse?\" (e.g., standing, change in head position)      no  7. HEART RATE: \"Can you tell me your heart rate?\" \"How many beats in 15 seconds?\"  (Note: Not all patients can do this.)        100  8. CAUSE: \"What do you think is causing the dizziness?\" (e.g., decreased fluids or food, diarrhea, emotional distress, heat exposure, new medicine, sudden standing, vomiting; unknown)      unsure  9. RECURRENT SYMPTOM: \"Have you " "had dizziness before?\" If Yes, ask: \"When was the last time?\" \"What happened that time?\"      yes  10. OTHER SYMPTOMS: \"Do you have any other symptoms?\" (e.g., fever, chest pain, vomiting, diarrhea, bleeding)        no  11. PREGNANCY: \"Is there any chance you are pregnant?\" \"When was your last menstrual period?\"        24w1d    Protocols used: Dizziness - Lightheadedness-Adult-AH    "

## 2025-04-30 NOTE — PROGRESS NOTES
Progress Note - OB/GYN   Name: Allegra Camacho 25 y.o. 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  She denies any hx of previously elevated BP  BP on admission 138/80; denies any headaches, changes in vision, SOB, chest pain, RUQ or epigastric pain  Denies contractions, loss of fluid, vaginal bleeding, or decreased fetal movement  Will obtain CBC, CMP, urine p/c ratio  Will also administer IV fluids    OB L&D Progress Note  Subjective   Patient is a 24 yo  at 24w1d who presents today with dizziness, lightheadedness, and elevated blood pressures at work today. She since feels better after eating something. She denies any elevated blood pressures previously. She denies any headaches, changes in vision, SOB, chest pain, RUQ or epigastric pain. Denies contractions, loss of fluid, vaginal bleeding, or decreased fetal movement    Objective :  Temp:  [98.6 °F (37 °C)] 98.6 °F (37 °C)  HR:  [83] 83  BP: (138)/(80) 138/80  Resp:  [18] 18    Physical Exam  Constitutional:       Appearance: Normal appearance.   HENT:      Head: Normocephalic and atraumatic.   Cardiovascular:      Rate and Rhythm: Normal rate.   Pulmonary:      Effort: Pulmonary effort is normal. No respiratory distress.   Abdominal:      Palpations: Abdomen is soft.      Tenderness: There is no abdominal tenderness.   Musculoskeletal:         General: Normal range of motion.   Skin:     General: Skin is warm and dry.   Neurological:      Mental Status: She is alert.       FHT: reactive; baseline 150 bpm, moderate variability, 10x10 accels, no decels

## 2025-05-02 ENCOUNTER — ROUTINE PRENATAL (OUTPATIENT)
Dept: OBGYN CLINIC | Facility: CLINIC | Age: 25
End: 2025-05-02
Payer: COMMERCIAL

## 2025-05-02 VITALS — BODY MASS INDEX: 35.1 KG/M2 | SYSTOLIC BLOOD PRESSURE: 116 MMHG | DIASTOLIC BLOOD PRESSURE: 62 MMHG | WEIGHT: 225.8 LBS

## 2025-05-02 DIAGNOSIS — O99.280 HYPOTHYROID IN PREGNANCY, ANTEPARTUM: ICD-10-CM

## 2025-05-02 DIAGNOSIS — Z3A.24 24 WEEKS GESTATION OF PREGNANCY: ICD-10-CM

## 2025-05-02 DIAGNOSIS — E03.9 HYPOTHYROID IN PREGNANCY, ANTEPARTUM: ICD-10-CM

## 2025-05-02 DIAGNOSIS — Z34.92 SECOND TRIMESTER PREGNANCY: Primary | ICD-10-CM

## 2025-05-02 DIAGNOSIS — O99.842 BARIATRIC SURGERY STATUS COMPLICATING PREGNANCY, SECOND TRIMESTER: ICD-10-CM

## 2025-05-02 PROCEDURE — 99214 OFFICE O/P EST MOD 30 MIN: CPT | Performed by: OBSTETRICS & GYNECOLOGY

## 2025-05-02 NOTE — PROGRESS NOTES
Assessment  25 y.o.  at 24w3d presenting for routine prenatal visit.     Plan  Diagnoses and all orders for this visit:    Second trimester pregnancy  24 weeks gestation of pregnancy  -     Routine pn care  - Normal movement  - Glucose, 1H PG; Future  -     CBC and differential; Future  -     RPR-Syphilis Screening (Total Syphilis IGG/IGM); Future  - Return in 4wk for PN    Bariatric surgery status complicating pregnancy, second trimester  -     Glucose, 1H PG; Future  -     CBC and differential; Future  -     RPR-Syphilis Screening (Total Syphilis IGG/IGM); Future  -     Ferritin; Future  -     Vitamin B12; Future  -     Vitamin B1, whole blood; Future  -     TSH, 3rd generation with Free T4 reflex; Future  - Follows with MFM    Hypothyroid in pregnancy, antepartum  -     TSH, 3rd generation with Free T4 reflex; Future      ____________________________________________________________        Subjective    Allegra Camacho is a 25 y.o.  at 24w3d who presents for routine prenatal visit. She is noting inc fatigue. No specific triggers. Denies dizziness, palpitations, sob. She denies contractions, loss of fluid, or vaginal bleeding. She feels regular fetal movements.     Pregnancy Problems:  Patient Active Problem List   Diagnosis    ADHD (attention deficit hyperactivity disorder)    BMI 27.0-27.9,adult    Acquired hypothyroidism    BRUCE (obstructive sleep apnea)    Encounter for surgical aftercare following surgery of digestive system    Postsurgical malabsorption    Long-term current use of stimulant    History of bariatric surgery    Controlled substance agreement signed    Osteochondral lesion    Miscarriage    20 weeks gestation of pregnancy    Hypothyroid in pregnancy, antepartum    Bariatric surgery status complicating pregnancy, third trimester    Elevated blood pressure affecting pregnancy in second trimester, antepartum         Objective  /62   Wt 102 kg (225 lb 12.8 oz)   LMP 2024  (Exact Date)   BMI 35.10 kg/m²     FHT: 148 BPM   Uterine Size: 25 cm     Physical Exam:  Physical Exam  Constitutional:       General: She is not in acute distress.     Appearance: Normal appearance. She is well-developed. She is not ill-appearing, toxic-appearing or diaphoretic.   HENT:      Head: Normocephalic and atraumatic.   Eyes:      General: No scleral icterus.        Right eye: No discharge.         Left eye: No discharge.      Conjunctiva/sclera: Conjunctivae normal.   Pulmonary:      Effort: Pulmonary effort is normal. No accessory muscle usage or respiratory distress.   Abdominal:      General: There is distension (gravid).      Tenderness: There is no abdominal tenderness. There is no guarding or rebound.   Skin:     General: Skin is warm and dry.      Coloration: Skin is not jaundiced.      Findings: No bruising, erythema or rash.   Neurological:      Mental Status: She is alert.   Psychiatric:         Mood and Affect: Mood normal.         Behavior: Behavior normal.         Thought Content: Thought content normal.         Judgment: Judgment normal.

## 2025-05-06 ENCOUNTER — OFFICE VISIT (OUTPATIENT)
Dept: FAMILY MEDICINE CLINIC | Facility: CLINIC | Age: 25
End: 2025-05-06
Payer: COMMERCIAL

## 2025-05-06 VITALS
WEIGHT: 225.6 LBS | RESPIRATION RATE: 18 BRPM | DIASTOLIC BLOOD PRESSURE: 64 MMHG | HEIGHT: 67 IN | TEMPERATURE: 97.4 F | OXYGEN SATURATION: 99 % | SYSTOLIC BLOOD PRESSURE: 112 MMHG | HEART RATE: 89 BPM | BODY MASS INDEX: 35.41 KG/M2

## 2025-05-06 DIAGNOSIS — E03.9 ACQUIRED HYPOTHYROIDISM: ICD-10-CM

## 2025-05-06 DIAGNOSIS — Z00.00 ANNUAL PHYSICAL EXAM: Primary | ICD-10-CM

## 2025-05-06 DIAGNOSIS — Z34.90 PREGNANCY, UNSPECIFIED GESTATIONAL AGE: ICD-10-CM

## 2025-05-06 DIAGNOSIS — F90.8 OTHER SPECIFIED ATTENTION DEFICIT HYPERACTIVITY DISORDER (ADHD): ICD-10-CM

## 2025-05-06 PROBLEM — Z79.899 LONG-TERM CURRENT USE OF STIMULANT: Status: RESOLVED | Noted: 2023-01-09 | Resolved: 2025-05-06

## 2025-05-06 PROCEDURE — 99395 PREV VISIT EST AGE 18-39: CPT | Performed by: FAMILY MEDICINE

## 2025-05-06 PROCEDURE — 99214 OFFICE O/P EST MOD 30 MIN: CPT | Performed by: FAMILY MEDICINE

## 2025-05-06 RX ORDER — LEVOTHYROXINE SODIUM 75 UG/1
75 TABLET ORAL DAILY
Qty: 90 TABLET | Refills: 1 | Status: SHIPPED | OUTPATIENT
Start: 2025-05-06

## 2025-05-06 NOTE — ASSESSMENT & PLAN NOTE
Stable, cpm  Orders:    levothyroxine 75 mcg tablet; Take 1 tablet (75 mcg total) by mouth daily

## 2025-05-06 NOTE — PATIENT INSTRUCTIONS
"Patient Education     Routine physical for adults   The Basics   Written by the doctors and editors at [facility]   What is a physical? -- A physical is a routine visit, or \"check-up,\" with your doctor. You might also hear it called a \"wellness visit\" or \"preventive visit.\"  During each visit, the doctor will:   Ask about your physical and mental health   Ask about your habits, behaviors, and lifestyle   Do an exam   Give you vaccines if needed   Talk to you about any medicines you take   Give advice about your health   Answer your questions  Getting regular check-ups is an important part of taking care of your health. It can help your doctor find and treat any problems you have. But it's also important for preventing health problems.  A routine physical is different from a \"sick visit.\" A sick visit is when you see a doctor because of a health concern or problem. Since physicals are scheduled ahead of time, you can think about what you want to ask the doctor.  How often should I get a physical? -- It depends on your age and health. In general, for people age 21 years and older:   If you are younger than 50 years, you might be able to get a physical every 3 years.   If you are 50 years or older, your doctor might recommend a physical every year.  If you have an ongoing health condition, like diabetes or high blood pressure, your doctor will probably want to see you more often.  What happens during a physical? -- In general, each visit will include:   Physical exam - The doctor or nurse will check your height, weight, heart rate, and blood pressure. They will also look at your eyes and ears. They will ask about how you are feeling and whether you have any symptoms that bother you.   Medicines - It's a good idea to bring a list of all the medicines you take to each doctor visit. Your doctor will talk to you about your medicines and answer any questions. Tell them if you are having any side effects that bother you. You " "should also tell them if you are having trouble paying for any of your medicines.   Habits and behaviors - This includes:   Your diet   Your exercise habits   Whether you smoke, drink alcohol, or use drugs   Whether you are sexually active   Whether you feel safe at home  Your doctor will talk to you about things you can do to improve your health and lower your risk of health problems. They will also offer help and support. For example, if you want to quit smoking, they can give you advice and might prescribe medicines. If you want to improve your diet or get more physical activity, they can help you with this, too.   Lab tests, if needed - The tests you get will depend on your age and situation. For example, your doctor might want to check your:   Cholesterol   Blood sugar   Iron level   Vaccines - The recommended vaccines will depend on your age, health, and what vaccines you already had. Vaccines are very important because they can prevent certain serious or deadly infections.   Discussion of screening - \"Screening\" means checking for diseases or other health problems before they cause symptoms. Your doctor can recommend screening based on your age, risk, and preferences. This might include tests to check for:   Cancer, such as breast, prostate, cervical, ovarian, colorectal, prostate, lung, or skin cancer   Sexually transmitted infections, such as chlamydia and gonorrhea   Mental health conditions like depression and anxiety  Your doctor will talk to you about the different types of screening tests. They can help you decide which screenings to have. They can also explain what the results might mean.   Answering questions - The physical is a good time to ask the doctor or nurse questions about your health. If needed, they can refer you to other doctors or specialists, too.  Adults older than 65 years often need other care, too. As you get older, your doctor will talk to you about:   How to prevent falling at " home   Hearing or vision tests   Memory testing   How to take your medicines safely   Making sure that you have the help and support you need at home  All topics are updated as new evidence becomes available and our peer review process is complete.  This topic retrieved from [employer] on: May 02, 2024.  Topic 733400 Version 1.0  Release: 32.4.3 - C32.122  © 2024 UpToDate, Inc. and/or its affiliates. All rights reserved.  Consumer Information Use and Disclaimer   Disclaimer: This generalized information is a limited summary of diagnosis, treatment, and/or medication information. It is not meant to be comprehensive and should be used as a tool to help the user understand and/or assess potential diagnostic and treatment options. It does NOT include all information about conditions, treatments, medications, side effects, or risks that may apply to a specific patient. It is not intended to be medical advice or a substitute for the medical advice, diagnosis, or treatment of a health care provider based on the health care provider's examination and assessment of a patient's specific and unique circumstances. Patients must speak with a health care provider for complete information about their health, medical questions, and treatment options, including any risks or benefits regarding use of medications. This information does not endorse any treatments or medications as safe, effective, or approved for treating a specific patient. UpToDate, Inc. and its affiliates disclaim any warranty or liability relating to this information or the use thereof.The use of this information is governed by the Terms of Use, available at https://www.woltersVisionGateuwer.com/en/know/clinical-effectiveness-terms. 2024© UpToDate, Inc. and its affiliates and/or licensors. All rights reserved.  Copyright   © 2024 UpToDate, Inc. and/or its affiliates. All rights reserved.

## 2025-05-06 NOTE — ASSESSMENT & PLAN NOTE
States ran out of Adderall last month because her now insurance didn't cover it, and just decided to wait until no longer pregnant to try to go back on it (currently pregnant)  Follow-up in 4-6 months

## 2025-05-06 NOTE — PROGRESS NOTES
Adult Annual Physical  Name: Allegra Camacho      : 2000      MRN: [id number]  Encounter Provider: Malathi Lambert DO  Encounter Date: 2025   Encounter department: [facility]    :  Assessment & Plan  Annual physical exam         Acquired hypothyroidism  Stable, cpm  Orders:    levothyroxine 75 mcg tablet; Take 1 tablet (75 mcg total) by mouth daily    Other specified attention deficit hyperactivity disorder (ADHD)  States ran out of Adderall last month because her now insurance didn't cover it, and just decided to wait until no longer pregnant to try to go back on it (currently pregnant)  Follow-up in 4-6 months       Pregnancy, unspecified gestational age  Managed by OB, doing well       Annual physical exam               Preventive Screenings:  - Diabetes Screening: screening up-to-date  - Hepatitis C screening: screening up-to-date   - HIV screening: screening up-to-date   - Cervical cancer screening: screening up-to-date   - Colon cancer screening: screening not indicated   - Lung cancer screening: screening not indicated     Will wait until postpartum to check lipid panel     Immunizations:  - Immunizations due: Hepatitis A       Chief Complaint   Patient presents with    Physical Exam    Follow-up       History of Present Illness     Adult Annual Physical:  Patient presents for annual physical. Plus problem-focused f/u visit  States ran out of Adderall last month because her now insurance didn't cover it, and just decided to wait until no longer pregnant to try to go back on it.     Diet and Physical Activity:  - Diet/Nutrition: well balanced diet.  - Exercise: walking.    General Health:  - Sleep: 7-8 hours of sleep on average.  - Hearing: normal hearing bilateral ears.  - Vision: wears glasses and most recent eye exam > 1 year ago.  - Dental: regular dental visits.    /GYN Health:  - Follows with GYN: yes.   - Menopause: premenopausal.   - Contraception:. currently  "pregnant      Review of Systems      Objective   /64 (BP Location: Left arm, Patient Position: Sitting, Cuff Size: Large)   Pulse 89   Temp (!) 97.4 °F (36.3 °C) (Tympanic)   Resp 18   Ht 5' 7.25\" (1.708 m)   Wt 102 kg (225 lb 9.6 oz)   LMP 11/12/2024 (Exact Date)   SpO2 99%   BMI 35.07 kg/m²     Physical Exam  Vitals and nursing note reviewed.   Constitutional:       General: She is not in acute distress.     Appearance: She is well-groomed. She is not ill-appearing, toxic-appearing or diaphoretic.   HENT:      Head: Normocephalic and atraumatic.      Right Ear: Tympanic membrane, ear canal and external ear normal.      Left Ear: Tympanic membrane, ear canal and external ear normal.      Nose: Nose normal.      Mouth/Throat:      Lips: Pink.      Mouth: Mucous membranes are moist.      Pharynx: Oropharynx is clear. Uvula midline.      Tonsils: 0 on the right. 0 on the left.   Eyes:      General: Lids are normal.      Extraocular Movements: Extraocular movements intact.      Conjunctiva/sclera: Conjunctivae normal.      Pupils: Pupils are equal, round, and reactive to light.   Neck:      Thyroid: No thyroid mass, thyromegaly or thyroid tenderness.      Vascular: No JVD.      Trachea: Trachea and phonation normal.   Cardiovascular:      Rate and Rhythm: Normal rate and regular rhythm.      Pulses: Normal pulses.      Heart sounds: Normal heart sounds.   Pulmonary:      Effort: Pulmonary effort is normal.      Breath sounds: Normal breath sounds and air entry.   Abdominal:      General: Bowel sounds are normal. There is no distension or abdominal bruit.      Palpations: Abdomen is soft. There is no hepatomegaly, splenomegaly or mass.      Tenderness: There is no abdominal tenderness.      Hernia: There is no hernia in the ventral area.   Musculoskeletal:      Cervical back: Neck supple.      Right lower leg: No edema.      Left lower leg: No edema.   Lymphadenopathy:      Cervical: No cervical " adenopathy.   Skin:     General: Skin is warm and dry.      Capillary Refill: Capillary refill takes less than 2 seconds.      Coloration: Skin is not pale.   Neurological:      General: No focal deficit present.      Mental Status: She is alert and oriented to person, place, and time.      Cranial Nerves: Cranial nerves 2-12 are intact.      Sensory: Sensation is intact.      Motor: Motor function is intact.      Gait: Gait normal.      Deep Tendon Reflexes: Reflexes are normal and symmetric.   Psychiatric:         Mood and Affect: Mood normal.         Behavior: Behavior normal. Behavior is cooperative.         Cognition and Memory: Cognition and memory normal.         Judgment: Judgment normal.

## 2025-05-22 ENCOUNTER — APPOINTMENT (OUTPATIENT)
Dept: LAB | Facility: CLINIC | Age: 25
End: 2025-05-22
Payer: COMMERCIAL

## 2025-05-22 DIAGNOSIS — Z3A.24 24 WEEKS GESTATION OF PREGNANCY: ICD-10-CM

## 2025-05-22 DIAGNOSIS — O99.842 BARIATRIC SURGERY STATUS COMPLICATING PREGNANCY, SECOND TRIMESTER: ICD-10-CM

## 2025-05-22 LAB
BASOPHILS # BLD AUTO: 0.04 THOUSANDS/ÂΜL (ref 0–0.1)
BASOPHILS NFR BLD AUTO: 1 % (ref 0–1)
EOSINOPHIL # BLD AUTO: 0.11 THOUSAND/ÂΜL (ref 0–0.61)
EOSINOPHIL NFR BLD AUTO: 1 % (ref 0–6)
ERYTHROCYTE [DISTWIDTH] IN BLOOD BY AUTOMATED COUNT: 12.4 % (ref 11.6–15.1)
GLUCOSE 1H P 50 G GLC PO SERPL-MCNC: 55 MG/DL (ref 70–134)
HCT VFR BLD AUTO: 33.3 % (ref 34.8–46.1)
HGB BLD-MCNC: 10.3 G/DL (ref 11.5–15.4)
IMM GRANULOCYTES # BLD AUTO: 0.04 THOUSAND/UL (ref 0–0.2)
IMM GRANULOCYTES NFR BLD AUTO: 1 % (ref 0–2)
LYMPHOCYTES # BLD AUTO: 1.22 THOUSANDS/ÂΜL (ref 0.6–4.47)
LYMPHOCYTES NFR BLD AUTO: 15 % (ref 14–44)
MCH RBC QN AUTO: 29.3 PG (ref 26.8–34.3)
MCHC RBC AUTO-ENTMCNC: 30.9 G/DL (ref 31.4–37.4)
MCV RBC AUTO: 95 FL (ref 82–98)
MONOCYTES # BLD AUTO: 0.6 THOUSAND/ÂΜL (ref 0.17–1.22)
MONOCYTES NFR BLD AUTO: 8 % (ref 4–12)
NEUTROPHILS # BLD AUTO: 5.91 THOUSANDS/ÂΜL (ref 1.85–7.62)
NEUTS SEG NFR BLD AUTO: 74 % (ref 43–75)
NRBC BLD AUTO-RTO: 0 /100 WBCS
PLATELET # BLD AUTO: 261 THOUSANDS/UL (ref 149–390)
PMV BLD AUTO: 10.6 FL (ref 8.9–12.7)
RBC # BLD AUTO: 3.51 MILLION/UL (ref 3.81–5.12)
WBC # BLD AUTO: 7.92 THOUSAND/UL (ref 4.31–10.16)

## 2025-05-22 PROCEDURE — 82728 ASSAY OF FERRITIN: CPT

## 2025-05-22 PROCEDURE — 86780 TREPONEMA PALLIDUM: CPT

## 2025-05-22 PROCEDURE — 82607 VITAMIN B-12: CPT

## 2025-05-22 PROCEDURE — 82950 GLUCOSE TEST: CPT

## 2025-05-22 PROCEDURE — 85025 COMPLETE CBC W/AUTO DIFF WBC: CPT

## 2025-05-22 PROCEDURE — 84443 ASSAY THYROID STIM HORMONE: CPT

## 2025-05-22 PROCEDURE — 84425 ASSAY OF VITAMIN B-1: CPT

## 2025-05-22 PROCEDURE — 36415 COLL VENOUS BLD VENIPUNCTURE: CPT

## 2025-05-23 LAB
FERRITIN SERPL-MCNC: 10 NG/ML (ref 30–307)
TREPONEMA PALLIDUM IGG+IGM AB [PRESENCE] IN SERUM OR PLASMA BY IMMUNOASSAY: NORMAL
TSH SERPL DL<=0.05 MIU/L-ACNC: 2.56 UIU/ML (ref 0.45–4.5)
VIT B12 SERPL-MCNC: 141 PG/ML (ref 180–914)

## 2025-05-27 ENCOUNTER — TELEPHONE (OUTPATIENT)
Age: 25
End: 2025-05-27

## 2025-05-27 NOTE — TELEPHONE ENCOUNTER
Patient calling to follow up with lab results from last week. Concern for low iron and glucose. Reports fatigue.    Message to Dr. Lundberg.

## 2025-05-27 NOTE — PATIENT INSTRUCTIONS
"Patient Education     Your baby's movement before birth   The Basics   Written by the doctors and editors at [facility]   When should I start feeling my baby move? -- It depends. Most people first feel their baby moving in the uterus between about 16 and 20 weeks of pregnancy. It might take longer to feel movement if this is your first pregnancy or if the placenta is in the front of your uterus.  What kinds of movements should I feel? -- When you first feel your baby move, it might feel like a gentle flutter in your belly. This is sometimes called \"quickening.\" As the baby grows, their movements will get stronger. You will probably feel them kicking, rolling, and stretching. Later in pregnancy, you might be able to see and feel the baby moving from the outside.  You might notice that your baby is more active at certain times of the day or night. Even before birth, babies have periods of being asleep and awake. When your baby is sleeping, you might notice that they do not move as much.  Should I keep track of my baby's movements? -- If your pregnancy is healthy, you probably do not need to count or record your baby's movements. Feeling regular movement is a good sign that the baby is doing well.  In some cases, your doctor or midwife might ask you to keep track of your baby's movements. If so, they will tell you how to do this and when to call them.  A change in your baby's movements does not always mean that there is a problem. But in some cases, it can be a sign that the baby is having trouble. If your doctor or midwife is concerned, they can do tests to check on the baby.  If I am asked to track movement, how should I do it? -- There are different ways of tracking your baby's movement. This is sometimes called \"kick counting.\"  Your doctor or midwife will tell you exactly what to track. For example, they might ask you to write down:   How long it takes to feel 10 kicks or movements   How many times your baby moves " in 1 hour  Many experts consider at least 10 movements in 2 hours to be a sign that the baby is doing well. But there is no specific cutoff for exactly how much movement is healthy or unhealthy. Some babies are more active than others, and some pregnant people feel movement more easily than others. The main goal of kick counting is to get to know your baby's normal patterns so you can tell if anything changes.  If you are doing kick counting:   Choose a time of day when your baby is usually active.   Find a quiet place where you will not be distracted.   Lie down on your side in a comfortable position.   Check the clock, or set a timer.   Each time you feel your baby move or kick, write down the time. Some people use a smartphone cleopatra to keep track.   If your baby seems less active than usual, try moving around, eating a snack, and emptying your bladder. This can help wake the baby up if they are asleep.   Stop counting after you have felt 10 kicks, or after the length of time your doctor or midwife told you.  When should I call the doctor? -- Call your doctor or midwife for advice if:   You have concerns about your baby's movement.   Your baby is moving less than they normally do.   You notice a sudden change in the pattern of your baby's movements.   You have any other symptoms that worry you.  All topics are updated as new evidence becomes available and our peer review process is complete.  This topic retrieved from [employer] on: Feb 26, 2024.  Topic 696007 Version 1.0  Release: 32.2.4 - C32.56  © 2024 UpToDate, Inc. and/or its affiliates. All rights reserved.  Consumer Information Use and Disclaimer   Disclaimer: This generalized information is a limited summary of diagnosis, treatment, and/or medication information. It is not meant to be comprehensive and should be used as a tool to help the user understand and/or assess potential diagnostic and treatment options. It does NOT include all information about  conditions, treatments, medications, side effects, or risks that may apply to a specific patient. It is not intended to be medical advice or a substitute for the medical advice, diagnosis, or treatment of a health care provider based on the health care provider's examination and assessment of a patient's specific and unique circumstances. Patients must speak with a health care provider for complete information about their health, medical questions, and treatment options, including any risks or benefits regarding use of medications. This information does not endorse any treatments or medications as safe, effective, or approved for treating a specific patient. UpToDate, Inc. and its affiliates disclaim any warranty or liability relating to this information or the use thereof.The use of this information is governed by the Terms of Use, available at https://www.cube19.com/en/know/clinical-effectiveness-terms. © UpToDate, Inc. and its affiliates and/or licensors. All rights reserved.  Copyright   ©  UpToDate, Inc. and/or its affiliates. All rights reserved.  Thank you for choosing us for your  care today.  If you have any questions about your ultrasound or care, please do not hesitate to contact us or your primary obstetrician.        Some general instructions for your pregnancy are:    Exercise: Aim for 150 minutes per week of regular exercise.  Walking is great!  Nutrition: Choose healthy sources of calcium, iron, and protein.  Avoid ultraprocessed foods and added sugar.  Learn about Preeclampsia: preeclampsia is a common, potentially serious high blood pressure complication in pregnancy.  A blood pressure of 140mmHg (systolic or top number) or 90mmHg (diastolic or bottom number) should be evaluated by your doctor.  Aspirin is sometimes prescribed in early pregnancy to prevent preeclampsia in women with risk factors - ask your obstetrician if you should be on this medication.  For more  resources, visit:  https://www.highriskpregnancyinfo.org/preeclampsia  If you smoke, please try to quit completely but also try to reduce your smoking by as much as possible (as soon as possible).  Do not vape.  Please also avoid cannabis products.  Other warning signs to watch out for in pregnancy or postpartum: chest pain, obstructed breathing or shortness of breath, seizures, thoughts of hurting yourself or your baby, bleeding, a painful or swollen leg, fever, or headache (see [facility] POST-BIRTH Warning Signs campaign).  If these happen call 911.  Itching is also not normal in pregnancy and if you experience this, especially over your hands and feet, potentially worse at night, notify your doctors.

## 2025-05-28 ENCOUNTER — RESULTS FOLLOW-UP (OUTPATIENT)
Age: 25
End: 2025-05-28

## 2025-05-28 DIAGNOSIS — O99.843 BARIATRIC SURGERY STATUS COMPLICATING PREGNANCY, THIRD TRIMESTER: ICD-10-CM

## 2025-05-28 DIAGNOSIS — E53.8 B12 DEFICIENCY: ICD-10-CM

## 2025-05-28 DIAGNOSIS — E61.1 IRON DEFICIENCY: Primary | ICD-10-CM

## 2025-05-28 LAB — VIT B1 BLD-SCNC: 118.2 NMOL/L (ref 66.5–200)

## 2025-05-29 ENCOUNTER — ULTRASOUND (OUTPATIENT)
Dept: PERINATAL CARE | Facility: OTHER | Age: 25
End: 2025-05-29
Payer: COMMERCIAL

## 2025-05-29 ENCOUNTER — ANCILLARY PROCEDURE (OUTPATIENT)
Dept: PERINATAL CARE | Facility: OTHER | Age: 25
End: 2025-05-29
Attending: OBSTETRICS & GYNECOLOGY
Payer: COMMERCIAL

## 2025-05-29 VITALS
WEIGHT: 238.2 LBS | HEIGHT: 67 IN | HEART RATE: 91 BPM | DIASTOLIC BLOOD PRESSURE: 78 MMHG | SYSTOLIC BLOOD PRESSURE: 120 MMHG | BODY MASS INDEX: 37.39 KG/M2

## 2025-05-29 DIAGNOSIS — Z3A.28 28 WEEKS GESTATION OF PREGNANCY: ICD-10-CM

## 2025-05-29 DIAGNOSIS — O99.283 HYPOTHYROIDISM IN PREGNANCY, THIRD TRIMESTER: Primary | ICD-10-CM

## 2025-05-29 DIAGNOSIS — O99.213 MATERNAL OBESITY, ANTEPARTUM, THIRD TRIMESTER: ICD-10-CM

## 2025-05-29 DIAGNOSIS — E03.9 HYPOTHYROIDISM IN PREGNANCY, THIRD TRIMESTER: Primary | ICD-10-CM

## 2025-05-29 DIAGNOSIS — O99.843 BARIATRIC SURGERY STATUS COMPLICATING PREGNANCY, THIRD TRIMESTER: ICD-10-CM

## 2025-05-29 DIAGNOSIS — E66.811 CLASS 1 OBESITY: ICD-10-CM

## 2025-05-29 DIAGNOSIS — Z33.1 NORMAL PREGNANCY, INCIDENTAL: ICD-10-CM

## 2025-05-29 PROCEDURE — 76816 OB US FOLLOW-UP PER FETUS: CPT | Performed by: OBSTETRICS & GYNECOLOGY

## 2025-05-29 PROCEDURE — 99214 OFFICE O/P EST MOD 30 MIN: CPT | Performed by: OBSTETRICS & GYNECOLOGY

## 2025-05-29 NOTE — ASSESSMENT & PLAN NOTE
Currently treated with 75 mcg of levothyroxine a day    Normal TSH level 2.563 uIU/mL on May 22, 2025

## 2025-05-29 NOTE — ASSESSMENT & PLAN NOTE
Repeat screening for gestational diabetes on May 22 normal    Fetal interval growth assessment normal    Recommend assessment of interval growth at 36 weeks gestation

## 2025-05-29 NOTE — LETTER
May 29, 2025     Sumi Lundberg MD  [address] [place] [address]  [address] [place] [address]    Patient: Allegra Camacho   YOB: 2000   Date of Visit: 2025       Dear Dr. Sumi Lundberg MD:    Thank you for referring Allegra Camacho to me for evaluation. Below are my notes for this consultation.    If you have questions, please do not hesitate to call me. I look forward to following your patient along with you.         Sincerely,        Praneeth Estrada MD        CC: No Recipients    Praneeth Estrada MD  2025  2:14 PM  Sign when Signing Visit  FOLLOW-UP: MATERNAL-FETAL MEDICINE  Name: Allegra Camacho      : 2000      MRN: [id number]  Encounter Provider: Praneeth Estrada MD  Encounter Date: 2025   Encounter department: [facility]  :  Assessment & Plan  28 weeks gestation of pregnancy         Bariatric surgery status complicating pregnancy, third trimester       Normal fetal growth evaluation today    Recommend assessment of interval growth at 36 weeks gestation  Hypothyroidism in pregnancy, third trimester       Currently treated with 75 mcg of levothyroxine a day    Normal TSH level 2.563 uIU/mL on May 22, 2025  Maternal obesity, antepartum, third trimester       Repeat screening for gestational diabetes on May 22 normal    Fetal interval growth assessment normal    Recommend assessment of interval growth at 36 weeks gestation  Class 1 obesity             History of Present Illness  HPI  Allegra Camacho is a 25 y.o. female who presents for assessment of fetal interval growth and MFM evaluation.  Exception of fatigue, Allegra has no complaints.  She reports regular fetal movement and denies problems related to vaginal bleeding,  labor, or hypertension.  Screening for gestational diabetes on May 22 revealed a normal 1 hour post Glucola value of 55 mg/dL.  Pregravid BMI was 30.8.  A prenatal office note of May 2 was reviewed prior to the MFM encounter.    [facility]  "[facility]: Allegra Camacho was seen today for fetal growth assessment ultrasound. See ultrasound report under \"OB Procedures\" tab.       "

## 2025-05-29 NOTE — ASSESSMENT & PLAN NOTE
Normal fetal growth evaluation today    Recommend assessment of interval growth at 36 weeks gestation

## 2025-05-29 NOTE — PROGRESS NOTES
"FOLLOW-UP: MATERNAL-FETAL MEDICINE  Name: Allegra Camacho      : 2000      MRN: [id number]  Encounter Provider: Praneeth Estrada MD  Encounter Date: 2025   Encounter department: [facility]  :  Assessment & Plan  28 weeks gestation of pregnancy         Bariatric surgery status complicating pregnancy, third trimester       Normal fetal growth evaluation today    Recommend assessment of interval growth at 36 weeks gestation  Hypothyroidism in pregnancy, third trimester       Currently treated with 75 mcg of levothyroxine a day    Normal TSH level 2.563 uIU/mL on May 22, 2025  Maternal obesity, antepartum, third trimester       Repeat screening for gestational diabetes on May 22 normal    Fetal interval growth assessment normal    Recommend assessment of interval growth at 36 weeks gestation  Class 1 obesity             History of Present Illness   HPI  Allegra Camacho is a 25 y.o. female who presents for assessment of fetal interval growth and MFM evaluation.  Exception of fatigue, Allegra has no complaints.  She reports regular fetal movement and denies problems related to vaginal bleeding,  labor, or hypertension.  Screening for gestational diabetes on May 22 revealed a normal 1 hour post Glucola value of 55 mg/dL.  Pregravid BMI was 30.8.  A prenatal office note of May 2 was reviewed prior to the MFM encounter.    [facility]: Allegra Camacho was seen today for fetal growth assessment ultrasound. See ultrasound report under \"OB Procedures\" tab.       "

## 2025-05-30 ENCOUNTER — ROUTINE PRENATAL (OUTPATIENT)
Dept: OBGYN CLINIC | Facility: CLINIC | Age: 25
End: 2025-05-30
Payer: COMMERCIAL

## 2025-05-30 VITALS — WEIGHT: 237.2 LBS | DIASTOLIC BLOOD PRESSURE: 66 MMHG | BODY MASS INDEX: 36.88 KG/M2 | SYSTOLIC BLOOD PRESSURE: 124 MMHG

## 2025-05-30 DIAGNOSIS — Z34.93 THIRD TRIMESTER PREGNANCY: Primary | ICD-10-CM

## 2025-05-30 DIAGNOSIS — O99.213 MATERNAL OBESITY, ANTEPARTUM, THIRD TRIMESTER: ICD-10-CM

## 2025-05-30 DIAGNOSIS — E53.8 B12 DEFICIENCY: ICD-10-CM

## 2025-05-30 DIAGNOSIS — O99.283 HYPOTHYROIDISM IN PREGNANCY, THIRD TRIMESTER: ICD-10-CM

## 2025-05-30 DIAGNOSIS — E03.9 HYPOTHYROIDISM IN PREGNANCY, THIRD TRIMESTER: ICD-10-CM

## 2025-05-30 DIAGNOSIS — E61.1 IRON DEFICIENCY: ICD-10-CM

## 2025-05-30 DIAGNOSIS — O99.843 BARIATRIC SURGERY STATUS COMPLICATING PREGNANCY, THIRD TRIMESTER: ICD-10-CM

## 2025-05-30 DIAGNOSIS — Z3A.28 28 WEEKS GESTATION OF PREGNANCY: ICD-10-CM

## 2025-05-30 PROCEDURE — 99214 OFFICE O/P EST MOD 30 MIN: CPT | Performed by: OBSTETRICS & GYNECOLOGY

## 2025-05-30 RX ORDER — CYANOCOBALAMIN 1000 UG/ML
1000 INJECTION, SOLUTION INTRAMUSCULAR; SUBCUTANEOUS ONCE
Status: CANCELLED | OUTPATIENT
Start: 2025-06-13 | End: 2025-05-30

## 2025-05-30 RX ORDER — SODIUM CHLORIDE 9 MG/ML
20 INJECTION, SOLUTION INTRAVENOUS ONCE
Status: CANCELLED | OUTPATIENT
Start: 2025-06-13

## 2025-05-31 PROBLEM — Z3A.28 28 WEEKS GESTATION OF PREGNANCY: Status: ACTIVE | Noted: 2025-04-02

## 2025-05-31 NOTE — PROGRESS NOTES
Assessment  25 y.o.  at 28w3d presenting for routine prenatal visit.     Plan  Diagnoses and all orders for this visit:    Third trimester pregnancy  28 weeks gestation of pregnancy  - PTL precautions  - C teaching  - Birth plan given, peds list given, birth consent signed  - 28 wk labs reviewed  - Tdap next visit  - Rhogam not indicated  - Return in 2wk for PN    Iron deficiency  B12 deficiency  - Venofer/B12 infusion scheduling ongoing    Maternal obesity, antepartum, third trimester  Bariatric surgery status complicating pregnancy, third trimester  - Follows with MFM  - Nutritional monitoring and mgmt as above    Hypothyroidism in pregnancy, third trimester  - Continue levothyroxine    ____________________________________________________________        Subjective    Allegra Camacho is a 25 y.o.  at 28w3d who presents for routine prenatal visit. She is without complaint. She denies contractions, loss of fluid, or vaginal bleeding. She feels regular fetal movements.     Pregnancy Problems:  Problem List[1]      Objective  /66   Wt 108 kg (237 lb 3.2 oz)   LMP 2024 (Exact Date)   BMI 36.88 kg/m²     FHT: 141 BPM   Uterine Size: 27 cm     Physical Exam:  Physical Exam  Constitutional:       General: She is not in acute distress.     Appearance: Normal appearance. She is well-developed. She is not ill-appearing, toxic-appearing or diaphoretic.   HENT:      Head: Normocephalic and atraumatic.     Eyes:      General: No scleral icterus.        Right eye: No discharge.         Left eye: No discharge.      Conjunctiva/sclera: Conjunctivae normal.     Pulmonary:      Effort: Pulmonary effort is normal. No accessory muscle usage or respiratory distress.   Abdominal:      General: There is distension (gravid).      Tenderness: There is no abdominal tenderness. There is no guarding or rebound.     Skin:     General: Skin is warm and dry.      Coloration: Skin is not jaundiced.      Findings: No  bruising, erythema or rash.     Neurological:      Mental Status: She is alert.     Psychiatric:         Mood and Affect: Mood normal.         Behavior: Behavior normal.         Thought Content: Thought content normal.         Judgment: Judgment normal.                [1]   Patient Active Problem List  Diagnosis    ADHD (attention deficit hyperactivity disorder)    BMI 27.0-27.9,adult    Acquired hypothyroidism    BRUCE (obstructive sleep apnea)    Encounter for surgical aftercare following surgery of digestive system    Postsurgical malabsorption    History of bariatric surgery    Controlled substance agreement signed    Osteochondral lesion    Miscarriage    20 weeks gestation of pregnancy    Hypothyroid in pregnancy, antepartum    Bariatric surgery status complicating pregnancy, third trimester    Elevated blood pressure affecting pregnancy in second trimester, antepartum    Maternal obesity, antepartum, third trimester    Hypothyroidism in pregnancy, third trimester    Iron deficiency    B12 deficiency

## 2025-06-02 ENCOUNTER — TELEPHONE (OUTPATIENT)
Age: 25
End: 2025-06-02

## 2025-06-02 NOTE — TELEPHONE ENCOUNTER
Patient states she did not pick a breast pump.  Clarification regarding the order.  Please call patient.

## 2025-06-02 NOTE — TELEPHONE ENCOUNTER
Order placed for breast pump.  No specific type chosen.  Insurance company should contact patient.  Patient aware.

## 2025-06-03 LAB
DME PARACHUTE DELIVERY DATE REQUESTED: NORMAL
DME PARACHUTE ITEM DESCRIPTION: NORMAL
DME PARACHUTE ORDER STATUS: NORMAL
DME PARACHUTE SUPPLIER NAME: NORMAL
DME PARACHUTE SUPPLIER PHONE: NORMAL

## 2025-06-04 ENCOUNTER — HOSPITAL ENCOUNTER (OUTPATIENT)
Dept: INFUSION CENTER | Facility: HOSPITAL | Age: 25
Discharge: HOME | End: 2025-06-04
Attending: OBSTETRICS & GYNECOLOGY
Payer: COMMERCIAL

## 2025-06-04 VITALS
HEART RATE: 89 BPM | TEMPERATURE: 97.3 F | OXYGEN SATURATION: 99 % | RESPIRATION RATE: 12 BRPM | SYSTOLIC BLOOD PRESSURE: 148 MMHG | DIASTOLIC BLOOD PRESSURE: 69 MMHG

## 2025-06-04 DIAGNOSIS — O99.843 BARIATRIC SURGERY STATUS COMPLICATING PREGNANCY, THIRD TRIMESTER: Primary | ICD-10-CM

## 2025-06-04 DIAGNOSIS — E61.1 IRON DEFICIENCY: ICD-10-CM

## 2025-06-04 DIAGNOSIS — E53.8 B12 DEFICIENCY: ICD-10-CM

## 2025-06-04 PROCEDURE — 96372 THER/PROPH/DIAG INJ SC/IM: CPT

## 2025-06-04 PROCEDURE — 96365 THER/PROPH/DIAG IV INF INIT: CPT

## 2025-06-04 RX ORDER — CYANOCOBALAMIN 1000 UG/ML
1000 INJECTION, SOLUTION INTRAMUSCULAR; SUBCUTANEOUS ONCE
Status: COMPLETED | OUTPATIENT
Start: 2025-06-04 | End: 2025-06-04

## 2025-06-04 RX ORDER — SODIUM CHLORIDE 9 MG/ML
20 INJECTION, SOLUTION INTRAVENOUS ONCE
Status: COMPLETED | OUTPATIENT
Start: 2025-06-04 | End: 2025-06-04

## 2025-06-04 RX ORDER — SODIUM CHLORIDE 9 MG/ML
20 INJECTION, SOLUTION INTRAVENOUS ONCE
Status: CANCELLED | OUTPATIENT
Start: 2025-06-11

## 2025-06-04 RX ORDER — CYANOCOBALAMIN 1000 UG/ML
1000 INJECTION, SOLUTION INTRAMUSCULAR; SUBCUTANEOUS ONCE
Status: CANCELLED | OUTPATIENT
Start: 2025-06-11 | End: 2025-06-11

## 2025-06-04 RX ADMIN — CYANOCOBALAMIN 1000 MCG: 1000 INJECTION, SOLUTION INTRAMUSCULAR at 14:48

## 2025-06-04 RX ADMIN — SODIUM CHLORIDE 20 ML/HR: 0.9 INJECTION, SOLUTION INTRAVENOUS at 14:48

## 2025-06-04 RX ADMIN — IRON SUCROSE 200 MG: 20 INJECTION, SOLUTION INTRAVENOUS at 14:58

## 2025-06-04 NOTE — PROGRESS NOTES
Allegra Janice  tolerated venofer infusion well with no complications.      Allegra Camacho is aware of future appt on 6/11 at 2:30PM.     AVS declined by Allegra Camacho.

## 2025-06-09 DIAGNOSIS — E03.9 ACQUIRED HYPOTHYROIDISM: ICD-10-CM

## 2025-06-10 RX ORDER — LEVOTHYROXINE SODIUM 75 UG/1
75 TABLET ORAL DAILY
Qty: 90 TABLET | Refills: 1 | Status: SHIPPED | OUTPATIENT
Start: 2025-06-10

## 2025-06-11 ENCOUNTER — HOSPITAL ENCOUNTER (OUTPATIENT)
Dept: INFUSION CENTER | Facility: HOSPITAL | Age: 25
Discharge: HOME | End: 2025-06-11
Attending: OBSTETRICS & GYNECOLOGY
Payer: COMMERCIAL

## 2025-06-11 DIAGNOSIS — E53.8 B12 DEFICIENCY: ICD-10-CM

## 2025-06-11 DIAGNOSIS — O99.843 BARIATRIC SURGERY STATUS COMPLICATING PREGNANCY, THIRD TRIMESTER: Primary | ICD-10-CM

## 2025-06-11 DIAGNOSIS — E61.1 IRON DEFICIENCY: ICD-10-CM

## 2025-06-11 PROCEDURE — 96365 THER/PROPH/DIAG IV INF INIT: CPT

## 2025-06-11 PROCEDURE — 96372 THER/PROPH/DIAG INJ SC/IM: CPT

## 2025-06-11 RX ORDER — CYANOCOBALAMIN 1000 UG/ML
1000 INJECTION, SOLUTION INTRAMUSCULAR; SUBCUTANEOUS ONCE
Status: COMPLETED | OUTPATIENT
Start: 2025-06-11 | End: 2025-06-11

## 2025-06-11 RX ORDER — SODIUM CHLORIDE 9 MG/ML
20 INJECTION, SOLUTION INTRAVENOUS ONCE
Status: CANCELLED | OUTPATIENT
Start: 2025-06-18

## 2025-06-11 RX ORDER — CYANOCOBALAMIN 1000 UG/ML
1000 INJECTION, SOLUTION INTRAMUSCULAR; SUBCUTANEOUS ONCE
Status: CANCELLED | OUTPATIENT
Start: 2025-06-18 | End: 2025-06-18

## 2025-06-11 RX ORDER — SODIUM CHLORIDE 9 MG/ML
20 INJECTION, SOLUTION INTRAVENOUS ONCE
Status: COMPLETED | OUTPATIENT
Start: 2025-06-11 | End: 2025-06-11

## 2025-06-11 RX ADMIN — IRON SUCROSE 200 MG: 20 INJECTION, SOLUTION INTRAVENOUS at 14:33

## 2025-06-11 RX ADMIN — SODIUM CHLORIDE 20 ML/HR: 0.9 INJECTION, SOLUTION INTRAVENOUS at 14:33

## 2025-06-11 RX ADMIN — CYANOCOBALAMIN 1000 MCG: 1000 INJECTION, SOLUTION INTRAMUSCULAR at 14:33

## 2025-06-11 NOTE — PROGRESS NOTES
Patient denies current infection or being on antibiotics.  Patient tolerated IV Venofer without reaction or issues. Allegra Camacho  tolerated B-12 injection treatment well with no complications.      Allegra Camacho is aware of future appt on 06/18/25 at 1430.   AVS No (Declined by Allegra Camacho) py has mychart       Patient ambulated off unit without incident.  All personal belongings taken with patient.

## 2025-06-11 NOTE — PROGRESS NOTES
Allegra Camacho  tolerated venofer treatment well with no complications.      Allegra Camacho is aware of future appt on 6/18    AVS printed and given to Allegra Camacho:  No (Declined by Allegra Camacho)

## 2025-06-17 ENCOUNTER — TELEPHONE (OUTPATIENT)
Dept: OBGYN CLINIC | Facility: CLINIC | Age: 25
End: 2025-06-17

## 2025-06-17 ENCOUNTER — OFFICE VISIT (OUTPATIENT)
Dept: FAMILY MEDICINE CLINIC | Facility: CLINIC | Age: 25
End: 2025-06-17
Payer: COMMERCIAL

## 2025-06-17 VITALS
TEMPERATURE: 97.3 F | WEIGHT: 245 LBS | RESPIRATION RATE: 18 BRPM | SYSTOLIC BLOOD PRESSURE: 124 MMHG | OXYGEN SATURATION: 99 % | HEIGHT: 67 IN | HEART RATE: 87 BPM | BODY MASS INDEX: 38.45 KG/M2 | DIASTOLIC BLOOD PRESSURE: 82 MMHG

## 2025-06-17 DIAGNOSIS — J06.9 UPPER RESPIRATORY TRACT INFECTION, UNSPECIFIED TYPE: Primary | ICD-10-CM

## 2025-06-17 LAB
SARS-COV-2 AG UPPER RESP QL IA: NEGATIVE
SL AMB POCT RAPID FLU A: NEGATIVE
SL AMB POCT RAPID FLU B: NEGATIVE
VALID CONTROL: NORMAL

## 2025-06-17 PROCEDURE — 87811 SARS-COV-2 COVID19 W/OPTIC: CPT | Performed by: FAMILY MEDICINE

## 2025-06-17 PROCEDURE — 87804 INFLUENZA ASSAY W/OPTIC: CPT | Performed by: FAMILY MEDICINE

## 2025-06-17 PROCEDURE — 99214 OFFICE O/P EST MOD 30 MIN: CPT | Performed by: FAMILY MEDICINE

## 2025-06-17 NOTE — TELEPHONE ENCOUNTER
Called patient for third trimester assessment.  Left message on voicemail and sent follow up [employer] message.

## 2025-06-17 NOTE — PROGRESS NOTES
"Name: Allegra Camacho      : 2000      MRN: [id number]  Encounter Provider: Malathi Lambert DO  Encounter Date: 2025   Encounter department: [facility] [place]  :  Assessment & Plan  Upper respiratory tract infection, unspecified type  Likely viral - continue saline NS and can add nasonex QD; if no better next approx 48 hours or worsens, then pt can call and I will call in abx    Orders:    POCT Rapid Covid Ag    POCT rapid flu A and B      Chief Complaint   Patient presents with    URI     Nasal congestion, runny nose, sneezing, and bilateral ear pain for 2 days          History of Present Illness   Same day sick appt  Her fiance was sick, but his sx included fever and body aches, and pt has not had these sx \"just all in my head - nose, ears, sneezing, headache, not coughing\"  Not taking any meds for sx - unsure what to take being that she is pregnant and wasn't sure if was just allergies  Clear mucous from the nose  + frontal sinus pressure      Review of Systems    Objective   /82 (BP Location: Left arm, Patient Position: Sitting, Cuff Size: Large)   Pulse 87   Temp (!) 97.3 °F (36.3 °C) (Tympanic)   Resp 18   Ht 5' 7.25\" (1.708 m)   Wt 111 kg (245 lb)   LMP 2024 (Exact Date)   SpO2 99%   BMI 38.09 kg/m²      Physical Exam  Vitals and nursing note reviewed.   Constitutional:       General: She is not in acute distress.     Appearance: She is well-groomed. She is not ill-appearing, toxic-appearing or diaphoretic.   HENT:      Head: Normocephalic and atraumatic.      Right Ear: Tympanic membrane, ear canal and external ear normal.      Left Ear: Tympanic membrane, ear canal and external ear normal.      Nose: Mucosal edema and congestion present.      Right Sinus: No maxillary sinus tenderness or frontal sinus tenderness.      Left Sinus: No maxillary sinus tenderness or frontal sinus tenderness.      Mouth/Throat:      Lips: Pink.      Mouth: Mucous membranes are " moist.      Pharynx: Oropharynx is clear.     Eyes:      General: Lids are normal.      Conjunctiva/sclera: Conjunctivae normal.     Neck:      Trachea: Trachea and phonation normal.     Cardiovascular:      Rate and Rhythm: Normal rate and regular rhythm.      Heart sounds: Normal heart sounds.   Pulmonary:      Effort: Pulmonary effort is normal.      Breath sounds: Normal breath sounds and air entry.     Musculoskeletal:      Cervical back: Neck supple.   Lymphadenopathy:      Cervical: No cervical adenopathy.     Skin:     General: Skin is warm and dry.      Coloration: Skin is not pale.     Neurological:      Mental Status: She is alert and oriented to person, place, and time.     Psychiatric:         Mood and Affect: Mood normal.         Behavior: Behavior is cooperative.

## 2025-06-18 ENCOUNTER — HOSPITAL ENCOUNTER (OUTPATIENT)
Dept: INFUSION CENTER | Facility: HOSPITAL | Age: 25
Discharge: HOME | End: 2025-06-18
Attending: OBSTETRICS & GYNECOLOGY
Payer: COMMERCIAL

## 2025-06-18 VITALS
TEMPERATURE: 98.1 F | OXYGEN SATURATION: 99 % | RESPIRATION RATE: 16 BRPM | HEART RATE: 100 BPM | DIASTOLIC BLOOD PRESSURE: 89 MMHG | SYSTOLIC BLOOD PRESSURE: 122 MMHG

## 2025-06-18 DIAGNOSIS — E61.1 IRON DEFICIENCY: ICD-10-CM

## 2025-06-18 DIAGNOSIS — O99.843 BARIATRIC SURGERY STATUS COMPLICATING PREGNANCY, THIRD TRIMESTER: Primary | ICD-10-CM

## 2025-06-18 DIAGNOSIS — E53.8 B12 DEFICIENCY: ICD-10-CM

## 2025-06-18 PROCEDURE — 96372 THER/PROPH/DIAG INJ SC/IM: CPT

## 2025-06-18 PROCEDURE — 96365 THER/PROPH/DIAG IV INF INIT: CPT

## 2025-06-18 RX ORDER — CYANOCOBALAMIN 1000 UG/ML
1000 INJECTION, SOLUTION INTRAMUSCULAR; SUBCUTANEOUS ONCE
Status: CANCELLED | OUTPATIENT
Start: 2025-06-25 | End: 2025-06-25

## 2025-06-18 RX ORDER — SODIUM CHLORIDE 9 MG/ML
20 INJECTION, SOLUTION INTRAVENOUS ONCE
Status: CANCELLED | OUTPATIENT
Start: 2025-06-25

## 2025-06-18 RX ORDER — CYANOCOBALAMIN 1000 UG/ML
1000 INJECTION, SOLUTION INTRAMUSCULAR; SUBCUTANEOUS ONCE
Status: COMPLETED | OUTPATIENT
Start: 2025-06-18 | End: 2025-06-18

## 2025-06-18 RX ORDER — SODIUM CHLORIDE 9 MG/ML
20 INJECTION, SOLUTION INTRAVENOUS ONCE
Status: COMPLETED | OUTPATIENT
Start: 2025-06-18 | End: 2025-06-18

## 2025-06-18 RX ADMIN — SODIUM CHLORIDE 200 MG: 9 INJECTION, SOLUTION INTRAVENOUS at 14:32

## 2025-06-18 RX ADMIN — CYANOCOBALAMIN 1000 MCG: 1000 INJECTION, SOLUTION INTRAMUSCULAR at 14:34

## 2025-06-18 RX ADMIN — SODIUM CHLORIDE 20 ML/HR: 0.9 INJECTION, SOLUTION INTRAVENOUS at 14:30

## 2025-06-18 NOTE — PROGRESS NOTES
Allegra Camacho  tolerated treatment well with no complications.      Allegra Camacho is aware of future appt on 6/25 at 2:30 PM.     AVS printed and given to Allegra Camacho:    No (Declined by Allegra Camacho)

## 2025-06-23 PROBLEM — Z3A.31 31 WEEKS GESTATION OF PREGNANCY: Status: ACTIVE | Noted: 2025-04-02

## 2025-06-24 PROBLEM — Z3A.32 32 WEEKS GESTATION OF PREGNANCY: Status: ACTIVE | Noted: 2025-04-02

## 2025-06-25 ENCOUNTER — ROUTINE PRENATAL (OUTPATIENT)
Dept: OBGYN CLINIC | Facility: CLINIC | Age: 25
End: 2025-06-25
Payer: COMMERCIAL

## 2025-06-25 ENCOUNTER — HOSPITAL ENCOUNTER (OUTPATIENT)
Dept: INFUSION CENTER | Facility: HOSPITAL | Age: 25
Discharge: HOME | End: 2025-06-25
Attending: OBSTETRICS & GYNECOLOGY
Payer: COMMERCIAL

## 2025-06-25 VITALS
RESPIRATION RATE: 13 BRPM | HEART RATE: 87 BPM | OXYGEN SATURATION: 99 % | TEMPERATURE: 97.6 F | SYSTOLIC BLOOD PRESSURE: 111 MMHG | DIASTOLIC BLOOD PRESSURE: 55 MMHG

## 2025-06-25 VITALS — BODY MASS INDEX: 38.46 KG/M2 | DIASTOLIC BLOOD PRESSURE: 68 MMHG | WEIGHT: 247.4 LBS | SYSTOLIC BLOOD PRESSURE: 120 MMHG

## 2025-06-25 DIAGNOSIS — O99.213 MATERNAL OBESITY, ANTEPARTUM, THIRD TRIMESTER: Primary | ICD-10-CM

## 2025-06-25 DIAGNOSIS — E61.1 IRON DEFICIENCY: ICD-10-CM

## 2025-06-25 DIAGNOSIS — E53.8 B12 DEFICIENCY: ICD-10-CM

## 2025-06-25 DIAGNOSIS — O16.2 ELEVATED BLOOD PRESSURE AFFECTING PREGNANCY IN SECOND TRIMESTER, ANTEPARTUM: ICD-10-CM

## 2025-06-25 DIAGNOSIS — O99.843 BARIATRIC SURGERY STATUS COMPLICATING PREGNANCY, THIRD TRIMESTER: ICD-10-CM

## 2025-06-25 DIAGNOSIS — Z3A.32 32 WEEKS GESTATION OF PREGNANCY: ICD-10-CM

## 2025-06-25 DIAGNOSIS — E03.9 HYPOTHYROIDISM IN PREGNANCY, THIRD TRIMESTER: ICD-10-CM

## 2025-06-25 DIAGNOSIS — O99.843 BARIATRIC SURGERY STATUS COMPLICATING PREGNANCY, THIRD TRIMESTER: Primary | ICD-10-CM

## 2025-06-25 DIAGNOSIS — O99.283 HYPOTHYROIDISM IN PREGNANCY, THIRD TRIMESTER: ICD-10-CM

## 2025-06-25 DIAGNOSIS — Z34.93 THIRD TRIMESTER PREGNANCY: ICD-10-CM

## 2025-06-25 PROCEDURE — 99213 OFFICE O/P EST LOW 20 MIN: CPT | Performed by: ADVANCED PRACTICE MIDWIFE

## 2025-06-25 PROCEDURE — 96365 THER/PROPH/DIAG IV INF INIT: CPT

## 2025-06-25 PROCEDURE — 96372 THER/PROPH/DIAG INJ SC/IM: CPT

## 2025-06-25 RX ORDER — SODIUM CHLORIDE 9 MG/ML
20 INJECTION, SOLUTION INTRAVENOUS ONCE
Status: CANCELLED | OUTPATIENT
Start: 2025-07-02

## 2025-06-25 RX ORDER — CYANOCOBALAMIN 1000 UG/ML
1000 INJECTION, SOLUTION INTRAMUSCULAR; SUBCUTANEOUS ONCE
Status: COMPLETED | OUTPATIENT
Start: 2025-06-25 | End: 2025-06-25

## 2025-06-25 RX ORDER — SODIUM CHLORIDE 9 MG/ML
20 INJECTION, SOLUTION INTRAVENOUS ONCE
Status: COMPLETED | OUTPATIENT
Start: 2025-06-25 | End: 2025-06-25

## 2025-06-25 RX ORDER — CYANOCOBALAMIN 1000 UG/ML
1000 INJECTION, SOLUTION INTRAMUSCULAR; SUBCUTANEOUS ONCE
Status: CANCELLED | OUTPATIENT
Start: 2025-07-02 | End: 2025-07-02

## 2025-06-25 RX ADMIN — IRON SUCROSE 200 MG: 20 INJECTION, SOLUTION INTRAVENOUS at 14:20

## 2025-06-25 RX ADMIN — SODIUM CHLORIDE 20 ML/HR: 0.9 INJECTION, SOLUTION INTRAVENOUS at 14:20

## 2025-06-25 RX ADMIN — CYANOCOBALAMIN 1000 MCG: 1000 INJECTION, SOLUTION INTRAMUSCULAR at 14:24

## 2025-06-25 NOTE — PROGRESS NOTES
Allegar Camacho  tolerated VENOFER treatment well with no complications.      Allegra Camacho is aware of future appt on 7/2 at 1430.     AVS printed and given to Allegra Camacho:  No (Declined by Allegra Camacho)- PT DID RECEIVE A WORK NOTE FOR THIS VISIT

## 2025-06-25 NOTE — ASSESSMENT & PLAN NOTE
Reviewed signs and symptoms PTL, FKC  NIPT and AFP is normal   Next growth scan 7/24/2025  Early GTT is normal 133 28-week GTT low  Desires TDAP at next visit  Next visit 2 weeks

## 2025-06-25 NOTE — PROGRESS NOTES
Name: Allegra Camcaho      : 2000      MRN: [id number]  Encounter Provider: Linda Brunner CNM  Encounter Date: 2025   Encounter department: [facility] [place]  :  Assessment & Plan  Maternal obesity, antepartum, third trimester  2025-growth scan           Hypothyroidism in pregnancy, third trimester           Currently treated with 75 mcg of levothyroxine a day    Normal TSH level 2.563 uIU/mL on May 22, 2025  Bariatric surgery status complicating pregnancy, third trimester         Iron deficiency  Started infusions           Elevated blood pressure affecting pregnancy in second trimester, antepartum         B12 deficiency  Started infusions           Third trimester pregnancy         32 weeks gestation of pregnancy  Reviewed signs and symptoms PTL, FKC  NIPT and AFP is normal   Next growth scan 2025  Early GTT is normal 133 28-week GTT low  Desires TDAP at next visit  Next visit 2 weeks                    History of Present Illness   Allegra Camacho is a 25 y.o.  female who presents for routine prenatal care at 32w1d.  Pregnancy ROS: no leakage of fluid, pelvic pain, or vaginal bleeding.  Good fetal movement.    Objective:  /68   Wt 112 kg (247 lb 6.4 oz)   LMP 2024 (Exact Date)   BMI 38.46 kg/m²   Pregravid Weight/BMI: 89.8 kg (198 lb) (BMI 30.79)  Current Weight: 112 kg (247 lb 6.4 oz)   Total Weight Gain: 22.4 kg (49 lb 6.4 oz)   Pre-Ginger Vitals    Flowsheet Row Most Recent Value   Prenatal Assessment    Fetal Heart Rate 150   Movement Present   Prenatal Vitals    Blood Pressure 120/68   Weight - Scale 112 kg (247 lb 6.4 oz)   Urine Albumin/Glucose    Dilation/Effacement/Station    Vaginal Drainage    Edema    LLE Edema Trace   RLE Edema Trace            Allegra Camacho is a 25 y.o. female who presents for prenatal visit  History obtained from: patient    Review of Systems   Constitutional:  Negative for chills and fever.   Respiratory:  Negative for  cough and shortness of breath.    Cardiovascular:  Negative for chest pain and palpitations.   Genitourinary:  Negative for difficulty urinating and vaginal bleeding.   Neurological:  Negative for headaches.   Psychiatric/Behavioral:  The patient is not nervous/anxious.      Medical History Reviewed by provider this encounter:     .  Medications Ordered Prior to Encounter[1]      Objective   /68   Wt 112 kg (247 lb 6.4 oz)   LMP 11/12/2024 (Exact Date)   BMI 38.46 kg/m²      Physical Exam  Vitals reviewed.   Constitutional:       Appearance: Normal appearance.   Pulmonary:      Effort: Pulmonary effort is normal.   Abdominal:      Comments: Gravid uterus     Neurological:      Mental Status: She is alert and oriented to person, place, and time.     Psychiatric:         Mood and Affect: Mood normal.         Behavior: Behavior normal.              [1]   Current Outpatient Medications on File Prior to Visit   Medication Sig Dispense Refill    aspirin (ECOTRIN LOW STRENGTH) 81 mg EC tablet Take 2 tablets (162 mg total) by mouth daily 180 tablet 3    hydrOXYzine HCL (ATARAX) 25 mg tablet Take 1 tablet (25 mg total) by mouth every 6 (six) hours as needed for anxiety 30 tablet 5    levothyroxine 75 mcg tablet Take 1 tablet (75 mcg total) by mouth daily 90 tablet 1    Prenatal Vit-Fe Fumarate-FA (PRENATAL VITAMIN PO) Take by mouth       No current facility-administered medications on file prior to visit.

## 2025-07-02 ENCOUNTER — HOSPITAL ENCOUNTER (OUTPATIENT)
Dept: INFUSION CENTER | Facility: HOSPITAL | Age: 25
Discharge: HOME | End: 2025-07-02
Attending: OBSTETRICS & GYNECOLOGY
Payer: COMMERCIAL

## 2025-07-02 VITALS
OXYGEN SATURATION: 98 % | SYSTOLIC BLOOD PRESSURE: 109 MMHG | TEMPERATURE: 96.7 F | DIASTOLIC BLOOD PRESSURE: 72 MMHG | HEART RATE: 77 BPM | RESPIRATION RATE: 16 BRPM

## 2025-07-02 DIAGNOSIS — O99.843 BARIATRIC SURGERY STATUS COMPLICATING PREGNANCY, THIRD TRIMESTER: Primary | ICD-10-CM

## 2025-07-02 DIAGNOSIS — E53.8 B12 DEFICIENCY: ICD-10-CM

## 2025-07-02 DIAGNOSIS — E61.1 IRON DEFICIENCY: ICD-10-CM

## 2025-07-02 PROCEDURE — 96372 THER/PROPH/DIAG INJ SC/IM: CPT

## 2025-07-02 PROCEDURE — 96365 THER/PROPH/DIAG IV INF INIT: CPT

## 2025-07-02 RX ORDER — CYANOCOBALAMIN 1000 UG/ML
1000 INJECTION, SOLUTION INTRAMUSCULAR; SUBCUTANEOUS ONCE
Status: CANCELLED | OUTPATIENT
Start: 2025-07-09 | End: 2025-07-09

## 2025-07-02 RX ORDER — SODIUM CHLORIDE 9 MG/ML
20 INJECTION, SOLUTION INTRAVENOUS ONCE
Status: CANCELLED | OUTPATIENT
Start: 2025-07-09

## 2025-07-02 RX ORDER — SODIUM CHLORIDE 9 MG/ML
20 INJECTION, SOLUTION INTRAVENOUS ONCE
Status: COMPLETED | OUTPATIENT
Start: 2025-07-02 | End: 2025-07-02

## 2025-07-02 RX ORDER — CYANOCOBALAMIN 1000 UG/ML
1000 INJECTION, SOLUTION INTRAMUSCULAR; SUBCUTANEOUS ONCE
Status: COMPLETED | OUTPATIENT
Start: 2025-07-02 | End: 2025-07-02

## 2025-07-02 RX ADMIN — CYANOCOBALAMIN 1000 MCG: 1000 INJECTION, SOLUTION INTRAMUSCULAR at 15:17

## 2025-07-02 RX ADMIN — SODIUM CHLORIDE 20 ML/HR: 0.9 INJECTION, SOLUTION INTRAVENOUS at 15:08

## 2025-07-02 RX ADMIN — IRON SUCROSE 200 MG: 20 INJECTION, SOLUTION INTRAVENOUS at 15:08

## 2025-07-02 NOTE — PROGRESS NOTES
Patient denies current infection or being on antibiotics.  Patient tolerated IV Venofer without reaction or issues.  Allegra Camacho  tolerated treatment well with no complications.      Allegra Camacho is aware of future appt on 07/09/25 at 1430.     AVS  No (Declined by Allegra Camacho) pt has mychart     Patient ambulated off unit without incident.  All personal belongings taken with patient.

## 2025-07-11 ENCOUNTER — ROUTINE PRENATAL (OUTPATIENT)
Dept: OBGYN CLINIC | Facility: CLINIC | Age: 25
End: 2025-07-11
Payer: COMMERCIAL

## 2025-07-11 VITALS — WEIGHT: 251 LBS | BODY MASS INDEX: 39.02 KG/M2 | DIASTOLIC BLOOD PRESSURE: 70 MMHG | SYSTOLIC BLOOD PRESSURE: 126 MMHG

## 2025-07-11 DIAGNOSIS — E03.9 HYPOTHYROIDISM IN PREGNANCY, THIRD TRIMESTER: ICD-10-CM

## 2025-07-11 DIAGNOSIS — Z23 ENCOUNTER FOR IMMUNIZATION: ICD-10-CM

## 2025-07-11 DIAGNOSIS — E61.1 IRON DEFICIENCY: ICD-10-CM

## 2025-07-11 DIAGNOSIS — O99.843 BARIATRIC SURGERY STATUS COMPLICATING PREGNANCY, THIRD TRIMESTER: ICD-10-CM

## 2025-07-11 DIAGNOSIS — E53.8 B12 DEFICIENCY: ICD-10-CM

## 2025-07-11 DIAGNOSIS — Z3A.34 34 WEEKS GESTATION OF PREGNANCY: ICD-10-CM

## 2025-07-11 DIAGNOSIS — O99.283 HYPOTHYROIDISM IN PREGNANCY, THIRD TRIMESTER: ICD-10-CM

## 2025-07-11 DIAGNOSIS — Z34.93 THIRD TRIMESTER PREGNANCY: Primary | ICD-10-CM

## 2025-07-11 DIAGNOSIS — O99.213 MATERNAL OBESITY, ANTEPARTUM, THIRD TRIMESTER: ICD-10-CM

## 2025-07-11 PROCEDURE — 90471 IMMUNIZATION ADMIN: CPT | Performed by: OBSTETRICS & GYNECOLOGY

## 2025-07-11 PROCEDURE — 90715 TDAP VACCINE 7 YRS/> IM: CPT | Performed by: OBSTETRICS & GYNECOLOGY

## 2025-07-11 PROCEDURE — 99214 OFFICE O/P EST MOD 30 MIN: CPT | Performed by: OBSTETRICS & GYNECOLOGY

## 2025-07-15 DIAGNOSIS — O99.843 BARIATRIC SURGERY STATUS COMPLICATING PREGNANCY, THIRD TRIMESTER: ICD-10-CM

## 2025-07-15 DIAGNOSIS — E53.8 B12 DEFICIENCY: ICD-10-CM

## 2025-07-15 DIAGNOSIS — E61.1 IRON DEFICIENCY: Primary | ICD-10-CM

## 2025-07-15 RX ORDER — SODIUM CHLORIDE 9 MG/ML
20 INJECTION, SOLUTION INTRAVENOUS ONCE
Status: CANCELLED | OUTPATIENT
Start: 2025-07-16

## 2025-07-15 RX ORDER — CYANOCOBALAMIN 1000 UG/ML
1000 INJECTION, SOLUTION INTRAMUSCULAR; SUBCUTANEOUS ONCE
Status: CANCELLED | OUTPATIENT
Start: 2025-07-16 | End: 2025-07-15

## 2025-07-16 ENCOUNTER — HOSPITAL ENCOUNTER (OUTPATIENT)
Dept: INFUSION CENTER | Facility: HOSPITAL | Age: 25
Discharge: HOME | End: 2025-07-16
Attending: OBSTETRICS & GYNECOLOGY
Payer: COMMERCIAL

## 2025-07-16 VITALS
SYSTOLIC BLOOD PRESSURE: 130 MMHG | OXYGEN SATURATION: 98 % | HEART RATE: 86 BPM | TEMPERATURE: 97.5 F | RESPIRATION RATE: 16 BRPM | DIASTOLIC BLOOD PRESSURE: 58 MMHG

## 2025-07-16 DIAGNOSIS — E53.8 B12 DEFICIENCY: Primary | ICD-10-CM

## 2025-07-16 DIAGNOSIS — O99.843 BARIATRIC SURGERY STATUS COMPLICATING PREGNANCY, THIRD TRIMESTER: ICD-10-CM

## 2025-07-16 DIAGNOSIS — E61.1 IRON DEFICIENCY: ICD-10-CM

## 2025-07-16 LAB
ERYTHROCYTE [DISTWIDTH] IN BLOOD BY AUTOMATED COUNT: 14.9 % (ref 11.6–15.1)
HCT VFR BLD AUTO: 35.3 % (ref 34.8–46.1)
HGB BLD-MCNC: 11.2 G/DL (ref 11.5–15.4)
MCH RBC QN AUTO: 30 PG (ref 26.8–34.3)
MCHC RBC AUTO-ENTMCNC: 31.7 G/DL (ref 31.4–37.4)
MCV RBC AUTO: 95 FL (ref 82–98)
PLATELET # BLD AUTO: 222 THOUSANDS/UL (ref 149–390)
PMV BLD AUTO: 11 FL (ref 8.9–12.7)
RBC # BLD AUTO: 3.73 MILLION/UL (ref 3.81–5.12)
WBC # BLD AUTO: 9.4 THOUSAND/UL (ref 4.31–10.16)

## 2025-07-16 PROCEDURE — 96365 THER/PROPH/DIAG IV INF INIT: CPT

## 2025-07-16 PROCEDURE — 85027 COMPLETE CBC AUTOMATED: CPT | Performed by: OBSTETRICS & GYNECOLOGY

## 2025-07-16 PROCEDURE — 82728 ASSAY OF FERRITIN: CPT | Performed by: OBSTETRICS & GYNECOLOGY

## 2025-07-16 PROCEDURE — 82607 VITAMIN B-12: CPT | Performed by: OBSTETRICS & GYNECOLOGY

## 2025-07-16 PROCEDURE — 96372 THER/PROPH/DIAG INJ SC/IM: CPT

## 2025-07-16 RX ORDER — CYANOCOBALAMIN 1000 UG/ML
1000 INJECTION, SOLUTION INTRAMUSCULAR; SUBCUTANEOUS ONCE
Status: COMPLETED | OUTPATIENT
Start: 2025-07-16 | End: 2025-07-16

## 2025-07-16 RX ORDER — SODIUM CHLORIDE 9 MG/ML
20 INJECTION, SOLUTION INTRAVENOUS ONCE
Status: CANCELLED | OUTPATIENT
Start: 2025-07-23

## 2025-07-16 RX ORDER — CYANOCOBALAMIN 1000 UG/ML
1000 INJECTION, SOLUTION INTRAMUSCULAR; SUBCUTANEOUS ONCE
Status: CANCELLED | OUTPATIENT
Start: 2025-07-23 | End: 2025-07-22

## 2025-07-16 RX ORDER — SODIUM CHLORIDE 9 MG/ML
20 INJECTION, SOLUTION INTRAVENOUS ONCE
Status: COMPLETED | OUTPATIENT
Start: 2025-07-16 | End: 2025-07-16

## 2025-07-16 RX ADMIN — SODIUM CHLORIDE 20 ML/HR: 0.9 INJECTION, SOLUTION INTRAVENOUS at 14:22

## 2025-07-16 RX ADMIN — SODIUM CHLORIDE 200 MG: 9 INJECTION, SOLUTION INTRAVENOUS at 14:25

## 2025-07-16 RX ADMIN — CYANOCOBALAMIN 1000 MCG: 1000 INJECTION, SOLUTION INTRAMUSCULAR at 14:26

## 2025-07-16 NOTE — PROGRESS NOTES
Allegra Camacho  tolerated B12 injection to right deltoid and Venofer treatment well with no complications.      Allegra Camacho is aware of future appt on 7/23 at 2:30PM.     AVS printed and given to Allegra Camacho: No (Declined by Allegra Camacho).

## 2025-07-17 LAB
FERRITIN SERPL-MCNC: 75 NG/ML (ref 30–307)
VIT B12 SERPL-MCNC: 195 PG/ML (ref 180–914)

## 2025-07-21 ENCOUNTER — ROUTINE PRENATAL (OUTPATIENT)
Dept: OBGYN CLINIC | Facility: CLINIC | Age: 25
End: 2025-07-21
Payer: COMMERCIAL

## 2025-07-21 VITALS — BODY MASS INDEX: 39.21 KG/M2 | DIASTOLIC BLOOD PRESSURE: 68 MMHG | SYSTOLIC BLOOD PRESSURE: 110 MMHG | WEIGHT: 252.2 LBS

## 2025-07-21 DIAGNOSIS — Z34.93 THIRD TRIMESTER PREGNANCY: ICD-10-CM

## 2025-07-21 DIAGNOSIS — E03.9 HYPOTHYROIDISM IN PREGNANCY, THIRD TRIMESTER: ICD-10-CM

## 2025-07-21 DIAGNOSIS — O99.213 MATERNAL OBESITY, ANTEPARTUM, THIRD TRIMESTER: ICD-10-CM

## 2025-07-21 DIAGNOSIS — Z3A.35 35 WEEKS GESTATION OF PREGNANCY: ICD-10-CM

## 2025-07-21 DIAGNOSIS — O99.843 BARIATRIC SURGERY STATUS COMPLICATING PREGNANCY, THIRD TRIMESTER: ICD-10-CM

## 2025-07-21 DIAGNOSIS — O99.283 HYPOTHYROIDISM IN PREGNANCY, THIRD TRIMESTER: ICD-10-CM

## 2025-07-21 PROCEDURE — 87150 DNA/RNA AMPLIFIED PROBE: CPT | Performed by: ADVANCED PRACTICE MIDWIFE

## 2025-07-21 PROCEDURE — 99213 OFFICE O/P EST LOW 20 MIN: CPT | Performed by: ADVANCED PRACTICE MIDWIFE

## 2025-07-21 NOTE — ASSESSMENT & PLAN NOTE
Currently treated with 75 mcg of levothyroxine a day    Normal TSH level 2.563 uIU/mL on May 22, 2025    Orders:    TSH, 3rd generation; Future    T4, free; Future

## 2025-07-21 NOTE — PROGRESS NOTES
Name: Allegra Camacho      : 2000      MRN: [id number]  Encounter Provider: Linda Brunner CNM  Encounter Date: 2025   Encounter department: [facility]  :  Assessment & Plan  Bariatric surgery status complicating pregnancy, third trimester         Hypothyroidism in pregnancy, third trimester    Currently treated with 75 mcg of levothyroxine a day    Normal TSH level 2.563 uIU/mL on May 22, 2025    Orders:    TSH, 3rd generation; Future    T4, free; Future    Maternal obesity, antepartum, third trimester  2025-growth scan             Third trimester pregnancy         35 weeks gestation of pregnancy  Reviewed signs and symptoms PTL, FKC  NIPT and AFP is normal   Next growth scan 2025  Early GTT is normal 133 28-week GTT low  Group B strep culture today  Next visit 1 week         Orders:    Strep B DNA probe, amplification            History of Present Illness   Allegra Camacho is a 25 y.o.  female who presents for routine prenatal care at 35w6d.  Pregnancy ROS: no leakage of fluid, pelvic pain, or vaginal bleeding.  good fetal movement.    Objective:  /68   Wt 114 kg (252 lb 3.2 oz)   LMP 2024 (Exact Date)   BMI 39.21 kg/m²   Pregravid Weight/BMI: 89.8 kg (198 lb) (BMI 30.79)  Current Weight: 114 kg (252 lb 3.2 oz)   Total Weight Gain: 24.6 kg (54 lb 3.2 oz)   Pre- Vitals    Flowsheet Row Most Recent Value   Prenatal Assessment    Fetal Heart Rate 140   Movement Present   Prenatal Vitals    Blood Pressure 110/68   Weight - Scale 114 kg (252 lb 3.2 oz)   Urine Albumin/Glucose    Dilation/Effacement/Station    Vaginal Drainage    Edema    LLE Edema None   RLE Edema None            Allegra Camacho is a 25 y.o. female who presents for prenatal visit  History obtained from: patient    Review of Systems   Constitutional:  Negative for chills and fever.   Respiratory:  Negative for cough and shortness of breath.    Cardiovascular:  Negative for chest  pain and palpitations.   Genitourinary:  Negative for difficulty urinating and vaginal bleeding.   Psychiatric/Behavioral:  The patient is not nervous/anxious.      Medical History Reviewed by provider this encounter:     .  Medications Ordered Prior to Encounter[1]      Objective   /68   Wt 114 kg (252 lb 3.2 oz)   LMP 11/12/2024 (Exact Date)   BMI 39.21 kg/m²      Physical Exam  Vitals reviewed.   Constitutional:       Appearance: Normal appearance.   Pulmonary:      Effort: Pulmonary effort is normal.   Abdominal:      Comments: Gravid uterus     Neurological:      Mental Status: She is alert and oriented to person, place, and time.     Psychiatric:         Mood and Affect: Mood normal.         Behavior: Behavior normal.                  [1]   Current Outpatient Medications on File Prior to Visit   Medication Sig Dispense Refill    aspirin (ECOTRIN LOW STRENGTH) 81 mg EC tablet Take 2 tablets (162 mg total) by mouth daily 180 tablet 3    hydrOXYzine HCL (ATARAX) 25 mg tablet Take 1 tablet (25 mg total) by mouth every 6 (six) hours as needed for anxiety 30 tablet 5    levothyroxine 75 mcg tablet Take 1 tablet (75 mcg total) by mouth daily 90 tablet 1    Prenatal Vit-Fe Fumarate-FA (PRENATAL VITAMIN PO) Take by mouth       No current facility-administered medications on file prior to visit.

## 2025-07-21 NOTE — ASSESSMENT & PLAN NOTE
Reviewed signs and symptoms PTL, FKC  NIPT and AFP is normal   Next growth scan 7/24/2025  Early GTT is normal 133 28-week GTT low  Group B strep culture today  Next visit 1 week         Orders:    Strep B DNA probe, amplification

## 2025-07-23 ENCOUNTER — HOSPITAL ENCOUNTER (OUTPATIENT)
Dept: INFUSION CENTER | Facility: HOSPITAL | Age: 25
Discharge: HOME | End: 2025-07-23
Attending: OBSTETRICS & GYNECOLOGY
Payer: COMMERCIAL

## 2025-07-23 VITALS
DIASTOLIC BLOOD PRESSURE: 80 MMHG | HEART RATE: 88 BPM | RESPIRATION RATE: 16 BRPM | SYSTOLIC BLOOD PRESSURE: 129 MMHG | TEMPERATURE: 97 F | OXYGEN SATURATION: 98 %

## 2025-07-23 DIAGNOSIS — O99.843 BARIATRIC SURGERY STATUS COMPLICATING PREGNANCY, THIRD TRIMESTER: Primary | ICD-10-CM

## 2025-07-23 DIAGNOSIS — E53.8 B12 DEFICIENCY: ICD-10-CM

## 2025-07-23 DIAGNOSIS — E61.1 IRON DEFICIENCY: ICD-10-CM

## 2025-07-23 LAB — GP B STREP DNA SPEC QL NAA+PROBE: POSITIVE

## 2025-07-23 PROCEDURE — 96372 THER/PROPH/DIAG INJ SC/IM: CPT

## 2025-07-23 PROCEDURE — 96365 THER/PROPH/DIAG IV INF INIT: CPT

## 2025-07-23 RX ORDER — CYANOCOBALAMIN 1000 UG/ML
1000 INJECTION, SOLUTION INTRAMUSCULAR; SUBCUTANEOUS ONCE
Status: COMPLETED | OUTPATIENT
Start: 2025-07-23 | End: 2025-07-23

## 2025-07-23 RX ORDER — CYANOCOBALAMIN 1000 UG/ML
1000 INJECTION, SOLUTION INTRAMUSCULAR; SUBCUTANEOUS ONCE
Status: CANCELLED | OUTPATIENT
Start: 2025-07-30 | End: 2025-07-30

## 2025-07-23 RX ORDER — SODIUM CHLORIDE 9 MG/ML
20 INJECTION, SOLUTION INTRAVENOUS ONCE
Status: CANCELLED | OUTPATIENT
Start: 2025-07-30

## 2025-07-23 RX ORDER — SODIUM CHLORIDE 9 MG/ML
20 INJECTION, SOLUTION INTRAVENOUS ONCE
Status: COMPLETED | OUTPATIENT
Start: 2025-07-23 | End: 2025-07-23

## 2025-07-23 RX ADMIN — SODIUM CHLORIDE 20 ML/HR: 0.9 INJECTION, SOLUTION INTRAVENOUS at 14:30

## 2025-07-23 RX ADMIN — SODIUM CHLORIDE 200 MG: 9 INJECTION, SOLUTION INTRAVENOUS at 14:32

## 2025-07-23 RX ADMIN — CYANOCOBALAMIN 1000 MCG: 1000 INJECTION, SOLUTION INTRAMUSCULAR at 14:32

## 2025-07-24 ENCOUNTER — ULTRASOUND (OUTPATIENT)
Dept: PERINATAL CARE | Facility: OTHER | Age: 25
End: 2025-07-24
Payer: COMMERCIAL

## 2025-07-24 ENCOUNTER — ANCILLARY PROCEDURE (OUTPATIENT)
Dept: PERINATAL CARE | Facility: OTHER | Age: 25
End: 2025-07-24
Attending: OBSTETRICS & GYNECOLOGY
Payer: COMMERCIAL

## 2025-07-24 VITALS
HEIGHT: 67 IN | SYSTOLIC BLOOD PRESSURE: 112 MMHG | DIASTOLIC BLOOD PRESSURE: 70 MMHG | BODY MASS INDEX: 40.21 KG/M2 | WEIGHT: 256.2 LBS | HEART RATE: 78 BPM

## 2025-07-24 DIAGNOSIS — Z3A.36 36 WEEKS GESTATION OF PREGNANCY: ICD-10-CM

## 2025-07-24 DIAGNOSIS — O99.283 HYPOTHYROIDISM IN PREGNANCY, THIRD TRIMESTER: ICD-10-CM

## 2025-07-24 DIAGNOSIS — E03.9 HYPOTHYROIDISM IN PREGNANCY, THIRD TRIMESTER: ICD-10-CM

## 2025-07-24 DIAGNOSIS — O99.213 MATERNAL OBESITY, ANTEPARTUM, THIRD TRIMESTER: Primary | ICD-10-CM

## 2025-07-24 DIAGNOSIS — O26.03 EXCESSIVE WEIGHT GAIN DURING PREGNANCY IN THIRD TRIMESTER: ICD-10-CM

## 2025-07-24 DIAGNOSIS — O99.843 BARIATRIC SURGERY STATUS COMPLICATING PREGNANCY, THIRD TRIMESTER: ICD-10-CM

## 2025-07-24 PROCEDURE — 76816 OB US FOLLOW-UP PER FETUS: CPT | Performed by: OBSTETRICS & GYNECOLOGY

## 2025-07-24 PROCEDURE — 99214 OFFICE O/P EST MOD 30 MIN: CPT | Performed by: OBSTETRICS & GYNECOLOGY

## 2025-07-24 NOTE — PROGRESS NOTES
FOLLOW-UP: MATERNAL-FETAL MEDICINE  Name: Allegra Camacho      : 2000      MRN: [id number]  Encounter Provider: Carmen Dennison MD  Encounter Date: 2025   Encounter department: [facility]  :  Assessment & Plan  Maternal obesity, antepartum, third trimester  Ultrasound today shows a fetus that is growing concordant with her dates.  No malformations are detected.  Amniotic fluid appears normal.  No further follow-up ultrasounds are recommended at this time.   Orders:    Ambulatory Referral to Maternal Fetal Medicine; Future    Bariatric surgery status complicating pregnancy, third trimester  With vitamin B12 and ferritin deficiencies found earlier in pregnancy status post IV Venofer and IM vitamin B12 which has improved her levels and her hemoglobin is increasing.   Orders:    Ambulatory Referral to Maternal Fetal Medicine; Future    Hypothyroidism in pregnancy, third trimester  Well-controlled on levothyroxine 75 mcg.  Last TSH was 2.56 on 522 and she is aware she has repeat levels to be drawn.  She feels she will be able to go to the lab within a week.       Excessive weight gain during pregnancy in third trimester  58 lbs. Offered a nutrition consult which she agreed to.  We discussed that increased weight gain can be associated with an increased risk for developing preeclampsia.  Currently she has no edema/right upper quadrant pain.  She reports no signs or symptoms of preeclampsia.     We reviewed the signs and symptoms of preeclampsia and when to contact her OB provider.   Orders:    Ambulatory Referral to Maternal Fetal Medicine; Future    36 weeks gestation of pregnancy    Orders:    Ambulatory Referral to Maternal Fetal Medicine; Future        History of Present Illness     Allegra Camacho is a 25 y.o. female  at 36w2d who presents for fetal growth assessment ultrasound. She reports no obstetric complaints today.    Objective   /70 (BP Location: Left  "arm, Patient Position: Sitting, Cuff Size: Large)   Pulse 78   Ht 5' 7\" (1.702 m)   Wt 116 kg (256 lb 3.2 oz)   LMP 11/12/2024 (Exact Date)   BMI 40.13 kg/m²      Patient's last menstrual period was 11/12/2024 (exact date).  Estimated Delivery Date: 8/19/2025, by Last Menstrual Period      Please refer to \"Imaging\" for ultrasound report from today's visit.     Pre visit time reviewing her records 10 minutes  Face to face time 10 minutes  Post visit time on documentation of note, updating her problem list, adding orders and prescriptions 10 minutes.  Procedures that were completed today were charged separately.   The level of decision making was moderate complexity    Carmen Dennison MD    "

## 2025-07-24 NOTE — ASSESSMENT & PLAN NOTE
58 lbs. Offered a nutrition consult which she agreed to.  We discussed that increased weight gain can be associated with an increased risk for developing preeclampsia.  Currently she has no edema/right upper quadrant pain.  She reports no signs or symptoms of preeclampsia.     We reviewed the signs and symptoms of preeclampsia and when to contact her OB provider.   Orders:    Ambulatory Referral to Maternal Fetal Medicine; Future

## 2025-07-24 NOTE — ASSESSMENT & PLAN NOTE
With vitamin B12 and ferritin deficiencies found earlier in pregnancy status post IV Venofer and IM vitamin B12 which has improved her levels and her hemoglobin is increasing.   Orders:    Ambulatory Referral to Maternal Fetal Medicine; Future

## 2025-07-24 NOTE — ASSESSMENT & PLAN NOTE
Ultrasound today shows a fetus that is growing concordant with her dates.  No malformations are detected.  Amniotic fluid appears normal.  No further follow-up ultrasounds are recommended at this time.   Orders:    Ambulatory Referral to Maternal Fetal Medicine; Future

## 2025-07-24 NOTE — ASSESSMENT & PLAN NOTE
Well-controlled on levothyroxine 75 mcg.  Last TSH was 2.56 on 522 and she is aware she has repeat levels to be drawn.  She feels she will be able to go to the lab within a week.

## 2025-07-24 NOTE — LETTER
2025     Sumi Lundberg MD  [address] [place] [address]  [address] [place] [address]    Patient: Allegra Camacho   YOB: 2000   Date of Visit: 2025       Dear Dr. Sumi Lundberg MD:    Thank you for referring Allegra Camacho to me for evaluation. Below are my notes for this consultation.    If you have questions, please do not hesitate to call me. I look forward to following your patient along with you.         Sincerely,        Carmen Dennison MD        CC: No Recipients    Carmen Dennison MD  2025 10:40 AM  Sign when Signing Visit  FOLLOW-UP: MATERNAL-FETAL MEDICINE  Name: Allegra Camacho      : 2000      MRN: [id number]  Encounter Provider: Carmen Dennison MD  Encounter Date: 2025   Encounter department: [facility]  :  Assessment & Plan  Maternal obesity, antepartum, third trimester  Ultrasound today shows a fetus that is growing concordant with her dates.  No malformations are detected.  Amniotic fluid appears normal.  No further follow-up ultrasounds are recommended at this time.   Orders:  •  Ambulatory Referral to Maternal Fetal Medicine; Future    Bariatric surgery status complicating pregnancy, third trimester  With vitamin B12 and ferritin deficiencies found earlier in pregnancy status post IV Venofer and IM vitamin B12 which has improved her levels and her hemoglobin is increasing.   Orders:  •  Ambulatory Referral to Maternal Fetal Medicine; Future    Hypothyroidism in pregnancy, third trimester  Well-controlled on levothyroxine 75 mcg.  Last TSH was 2.56 on 522 and she is aware she has repeat levels to be drawn.  She feels she will be able to go to the lab within a week.       Excessive weight gain during pregnancy in third trimester  58 lbs. Offered a nutrition consult which she agreed to.  We discussed that increased weight gain can be associated with an increased risk for developing preeclampsia.  Currently she has no  "edema/right upper quadrant pain.  She reports no signs or symptoms of preeclampsia.     We reviewed the signs and symptoms of preeclampsia and when to contact her OB provider.   Orders:  •  Ambulatory Referral to Maternal Fetal Medicine; Future    36 weeks gestation of pregnancy    Orders:  •  Ambulatory Referral to Maternal Fetal Medicine; Future        History of Present Illness    Allegra Camacho is a 25 y.o. female  at 36w2d who presents for fetal growth assessment ultrasound. She reports no obstetric complaints today.    Objective  /70 (BP Location: Left arm, Patient Position: Sitting, Cuff Size: Large)   Pulse 78   Ht 5' 7\" (1.702 m)   Wt 116 kg (256 lb 3.2 oz)   LMP 2024 (Exact Date)   BMI 40.13 kg/m²      Patient's last menstrual period was 2024 (exact date).  Estimated Delivery Date: 2025, by Last Menstrual Period      Please refer to \"Imaging\" for ultrasound report from today's visit.     Pre visit time reviewing her records 10 minutes  Face to face time 10 minutes  Post visit time on documentation of note, updating her problem list, adding orders and prescriptions 10 minutes.  Procedures that were completed today were charged separately.   The level of decision making was moderate complexity    Carmen Dennison MD    "

## 2025-07-29 ENCOUNTER — APPOINTMENT (OUTPATIENT)
Dept: LAB | Facility: CLINIC | Age: 25
End: 2025-07-29
Attending: ADVANCED PRACTICE MIDWIFE
Payer: COMMERCIAL

## 2025-07-29 DIAGNOSIS — O99.283 HYPOTHYROIDISM IN PREGNANCY, THIRD TRIMESTER: ICD-10-CM

## 2025-07-29 DIAGNOSIS — E03.9 HYPOTHYROIDISM IN PREGNANCY, THIRD TRIMESTER: ICD-10-CM

## 2025-07-29 LAB
T4 FREE SERPL-MCNC: 0.54 NG/DL (ref 0.61–1.12)
TSH SERPL DL<=0.05 MIU/L-ACNC: 3.16 UIU/ML (ref 0.45–4.5)

## 2025-07-29 PROCEDURE — 84439 ASSAY OF FREE THYROXINE: CPT

## 2025-07-29 PROCEDURE — 84443 ASSAY THYROID STIM HORMONE: CPT

## 2025-07-29 PROCEDURE — 36415 COLL VENOUS BLD VENIPUNCTURE: CPT

## 2025-07-30 ENCOUNTER — HOSPITAL ENCOUNTER (OUTPATIENT)
Dept: INFUSION CENTER | Facility: HOSPITAL | Age: 25
Discharge: HOME | End: 2025-07-30
Attending: OBSTETRICS & GYNECOLOGY
Payer: COMMERCIAL

## 2025-07-30 DIAGNOSIS — E53.8 B12 DEFICIENCY: ICD-10-CM

## 2025-07-30 DIAGNOSIS — O99.843 BARIATRIC SURGERY STATUS COMPLICATING PREGNANCY, THIRD TRIMESTER: Primary | ICD-10-CM

## 2025-07-30 DIAGNOSIS — E61.1 IRON DEFICIENCY: ICD-10-CM

## 2025-07-30 PROCEDURE — 96365 THER/PROPH/DIAG IV INF INIT: CPT

## 2025-07-30 PROCEDURE — 96372 THER/PROPH/DIAG INJ SC/IM: CPT

## 2025-07-30 RX ORDER — CYANOCOBALAMIN 1000 UG/ML
1000 INJECTION, SOLUTION INTRAMUSCULAR; SUBCUTANEOUS ONCE
Status: COMPLETED | OUTPATIENT
Start: 2025-07-30 | End: 2025-07-30

## 2025-07-30 RX ORDER — SODIUM CHLORIDE 9 MG/ML
20 INJECTION, SOLUTION INTRAVENOUS ONCE
Status: COMPLETED | OUTPATIENT
Start: 2025-07-30 | End: 2025-07-30

## 2025-07-30 RX ORDER — CYANOCOBALAMIN 1000 UG/ML
1000 INJECTION, SOLUTION INTRAMUSCULAR; SUBCUTANEOUS ONCE
Status: CANCELLED | OUTPATIENT
Start: 2025-08-06 | End: 2025-08-06

## 2025-07-30 RX ORDER — SODIUM CHLORIDE 9 MG/ML
20 INJECTION, SOLUTION INTRAVENOUS ONCE
Status: CANCELLED | OUTPATIENT
Start: 2025-08-06

## 2025-07-30 RX ADMIN — SODIUM CHLORIDE 20 ML/HR: 0.9 INJECTION, SOLUTION INTRAVENOUS at 11:31

## 2025-07-30 RX ADMIN — IRON SUCROSE 200 MG: 20 INJECTION, SOLUTION INTRAVENOUS at 11:30

## 2025-07-30 RX ADMIN — CYANOCOBALAMIN 1000 MCG: 1000 INJECTION, SOLUTION INTRAMUSCULAR at 12:38

## 2025-07-31 ENCOUNTER — ROUTINE PRENATAL (OUTPATIENT)
Dept: OBGYN CLINIC | Facility: CLINIC | Age: 25
End: 2025-07-31
Payer: COMMERCIAL

## 2025-07-31 VITALS — BODY MASS INDEX: 40.78 KG/M2 | WEIGHT: 260.4 LBS | SYSTOLIC BLOOD PRESSURE: 120 MMHG | DIASTOLIC BLOOD PRESSURE: 70 MMHG

## 2025-07-31 DIAGNOSIS — O99.283 HYPOTHYROIDISM IN PREGNANCY, THIRD TRIMESTER: ICD-10-CM

## 2025-07-31 DIAGNOSIS — E61.1 IRON DEFICIENCY: ICD-10-CM

## 2025-07-31 DIAGNOSIS — O26.03 EXCESSIVE WEIGHT GAIN DURING PREGNANCY IN THIRD TRIMESTER: ICD-10-CM

## 2025-07-31 DIAGNOSIS — E03.9 HYPOTHYROIDISM IN PREGNANCY, THIRD TRIMESTER: ICD-10-CM

## 2025-07-31 DIAGNOSIS — Z3A.37 37 WEEKS GESTATION OF PREGNANCY: ICD-10-CM

## 2025-07-31 DIAGNOSIS — O99.843 BARIATRIC SURGERY STATUS COMPLICATING PREGNANCY, THIRD TRIMESTER: ICD-10-CM

## 2025-07-31 DIAGNOSIS — Z34.93 THIRD TRIMESTER PREGNANCY: Primary | ICD-10-CM

## 2025-07-31 DIAGNOSIS — E53.8 B12 DEFICIENCY: ICD-10-CM

## 2025-07-31 PROCEDURE — 99214 OFFICE O/P EST MOD 30 MIN: CPT | Performed by: OBSTETRICS & GYNECOLOGY

## 2025-08-01 ENCOUNTER — TELEPHONE (OUTPATIENT)
Age: 25
End: 2025-08-01

## 2025-08-03 PROBLEM — Z3A.37 37 WEEKS GESTATION OF PREGNANCY: Status: ACTIVE | Noted: 2025-04-02

## 2025-08-04 ENCOUNTER — TELEPHONE (OUTPATIENT)
Age: 25
End: 2025-08-04

## 2025-08-04 DIAGNOSIS — O99.283 HYPOTHYROIDISM AFFECTING PREGNANCY IN THIRD TRIMESTER: Primary | ICD-10-CM

## 2025-08-04 DIAGNOSIS — E03.9 HYPOTHYROIDISM AFFECTING PREGNANCY IN THIRD TRIMESTER: Primary | ICD-10-CM

## 2025-08-04 RX ORDER — LEVOTHYROXINE SODIUM 100 UG/1
100 TABLET ORAL DAILY
Qty: 30 TABLET | Refills: 2 | Status: ON HOLD | OUTPATIENT
Start: 2025-08-04

## 2025-08-08 ENCOUNTER — ROUTINE PRENATAL (OUTPATIENT)
Dept: OBGYN CLINIC | Facility: CLINIC | Age: 25
End: 2025-08-08
Payer: COMMERCIAL

## 2025-08-08 ENCOUNTER — TELEPHONE (OUTPATIENT)
Age: 25
End: 2025-08-08

## 2025-08-08 VITALS — WEIGHT: 261.2 LBS | DIASTOLIC BLOOD PRESSURE: 70 MMHG | SYSTOLIC BLOOD PRESSURE: 116 MMHG | BODY MASS INDEX: 40.91 KG/M2

## 2025-08-08 DIAGNOSIS — O26.03 EXCESSIVE WEIGHT GAIN DURING PREGNANCY IN THIRD TRIMESTER: ICD-10-CM

## 2025-08-08 DIAGNOSIS — O99.283 HYPOTHYROIDISM IN PREGNANCY, THIRD TRIMESTER: ICD-10-CM

## 2025-08-08 DIAGNOSIS — Z98.84 HISTORY OF BARIATRIC SURGERY: ICD-10-CM

## 2025-08-08 DIAGNOSIS — Z34.93 THIRD TRIMESTER PREGNANCY: ICD-10-CM

## 2025-08-08 DIAGNOSIS — O99.843 BARIATRIC SURGERY STATUS COMPLICATING PREGNANCY, THIRD TRIMESTER: ICD-10-CM

## 2025-08-08 DIAGNOSIS — E03.9 HYPOTHYROIDISM IN PREGNANCY, THIRD TRIMESTER: ICD-10-CM

## 2025-08-08 DIAGNOSIS — Z3A.38 38 WEEKS GESTATION OF PREGNANCY: ICD-10-CM

## 2025-08-08 DIAGNOSIS — O99.213 MATERNAL OBESITY, ANTEPARTUM, THIRD TRIMESTER: Primary | ICD-10-CM

## 2025-08-08 PROCEDURE — 99213 OFFICE O/P EST LOW 20 MIN: CPT | Performed by: ADVANCED PRACTICE MIDWIFE

## 2025-08-15 PROBLEM — Z3A.39 39 WEEKS GESTATION OF PREGNANCY: Status: ACTIVE | Noted: 2025-04-02

## 2025-08-17 ENCOUNTER — ANESTHESIA EVENT (INPATIENT)
Dept: ANESTHESIOLOGY | Facility: HOSPITAL | Age: 25
DRG: 560 | End: 2025-08-17
Payer: COMMERCIAL

## 2025-08-17 ENCOUNTER — ANESTHESIA (INPATIENT)
Dept: ANESTHESIOLOGY | Facility: HOSPITAL | Age: 25
DRG: 560 | End: 2025-08-17
Payer: COMMERCIAL

## 2025-08-17 ENCOUNTER — HOSPITAL ENCOUNTER (OUTPATIENT)
Dept: LABOR AND DELIVERY | Facility: HOSPITAL | Age: 25
Discharge: HOME | DRG: 560 | End: 2025-08-17
Payer: COMMERCIAL

## 2025-08-17 PROBLEM — Z34.90 ENCOUNTER FOR INDUCTION OF LABOR: Status: ACTIVE | Noted: 2025-08-17

## 2025-08-17 RX ORDER — LIDOCAINE HYDROCHLORIDE AND EPINEPHRINE 15; 5 MG/ML; UG/ML
INJECTION, SOLUTION EPIDURAL AS NEEDED
Status: DISCONTINUED | OUTPATIENT
Start: 2025-08-17 | End: 2025-08-18 | Stop reason: HOSPADM

## 2025-08-17 RX ADMIN — LIDOCAINE HYDROCHLORIDE AND EPINEPHRINE 3 ML: 15; 5 INJECTION, SOLUTION EPIDURAL; INFILTRATION; INTRACAUDAL; PERINEURAL at 18:11

## 2025-08-17 RX ADMIN — LIDOCAINE HYDROCHLORIDE AND EPINEPHRINE 2 ML: 15; 5 INJECTION, SOLUTION EPIDURAL; INFILTRATION; INTRACAUDAL; PERINEURAL at 18:13

## 2025-08-18 LAB
DME PARACHUTE DELIVERY DATE ACTUAL: NORMAL
DME PARACHUTE DELIVERY DATE REQUESTED: NORMAL
DME PARACHUTE ITEM DESCRIPTION: NORMAL
DME PARACHUTE ORDER STATUS: NORMAL
DME PARACHUTE SUPPLIER NAME: NORMAL
DME PARACHUTE SUPPLIER PHONE: NORMAL

## 2025-08-18 RX ORDER — BUPIVACAINE HYDROCHLORIDE 2.5 MG/ML
INJECTION, SOLUTION EPIDURAL; INFILTRATION; INTRACAUDAL; PERINEURAL AS NEEDED
Status: DISCONTINUED | OUTPATIENT
Start: 2025-08-18 | End: 2025-08-18 | Stop reason: HOSPADM

## 2025-08-18 RX ADMIN — BUPIVACAINE HYDROCHLORIDE 10 ML: 2.5 INJECTION, SOLUTION EPIDURAL; INFILTRATION; INTRACAUDAL; PERINEURAL at 02:10

## 2025-08-19 PROBLEM — O16.2 ELEVATED BLOOD PRESSURE AFFECTING PREGNANCY IN SECOND TRIMESTER, ANTEPARTUM: Status: RESOLVED | Noted: 2025-04-30 | Resolved: 2025-08-19

## 2025-08-19 PROBLEM — O13.9 GESTATIONAL HYPERTENSION: Status: ACTIVE | Noted: 2025-08-19

## 2025-08-20 ENCOUNTER — HOME MONITORING (OUTPATIENT)
Dept: OTHER | Facility: HOSPITAL | Age: 25
End: 2025-08-20

## 2025-08-20 DIAGNOSIS — O13.9 GESTATIONAL HYPERTENSION: Primary | ICD-10-CM

## 2025-08-22 ENCOUNTER — NURSE TRIAGE (OUTPATIENT)
Age: 25
End: 2025-08-22

## 2025-08-22 ENCOUNTER — NURSE TRIAGE (OUTPATIENT)
Dept: OTHER | Facility: OTHER | Age: 25
End: 2025-08-22

## (undated) DEVICE — MAYO STAND COVER: Brand: CONVERTORS

## (undated) DEVICE — SUT MONOCRYL 4-0 PS-2 27 IN Y426H

## (undated) DEVICE — STAPLE ENDO TRI-STAPLE 2.0 BLCK RELOAD XTRA THICK

## (undated) DEVICE — ADHESIVE SKIN CLSR DERMABOND NX

## (undated) DEVICE — TIBURON LAPAROSCOPIC ABDOMINAL DRAPE: Brand: CONVERTORS

## (undated) DEVICE — COVIDIEN ENDO GIA PURPLE (MED) RELOAD 60MM

## (undated) DEVICE — PMI DISPOSABLE PUNCTURE CLOSURE DEVICE / SUTURE GRASPER: Brand: PMI

## (undated) DEVICE — VISIGI 3D®  CALIBRATION SYSTEM  SIZE 36FR SLEEVE/STD: Brand: BOEHRINGER® VISIGI 3D™ SLEEVE GASTRECTOMY CALIBRATION SYSTEM, SIZE 36FR

## (undated) DEVICE — TRAVELKIT CONTAINS FIRST STEP KIT (200ML EP-4 KIT) AND SOILED SCOPE BAG - 1 KIT: Brand: TRAVELKIT CONTAINS FIRST STEP KIT AND SOILED SCOPE BAG

## (undated) DEVICE — VIOLET BRAIDED (POLYGLACTIN 910), SYNTHETIC ABSORBABLE SUTURE: Brand: COATED VICRYL

## (undated) DEVICE — [HIGH FLOW INSUFFLATOR,  DO NOT USE IF PACKAGE IS DAMAGED,  KEEP DRY,  KEEP AWAY FROM SUNLIGHT,  PROTECT FROM HEAT AND RADIOACTIVE SOURCES.]: Brand: PNEUMOSURE

## (undated) DEVICE — GLOVE SRG BIOGEL 7

## (undated) DEVICE — PAD CAST 4 IN COTTON NON STERILE

## (undated) DEVICE — INTENDED FOR TISSUE SEPARATION, AND OTHER PROCEDURES THAT REQUIRE A SHARP SURGICAL BLADE TO PUNCTURE OR CUT.: Brand: BARD-PARKER SAFETY BLADES SIZE 11, STERILE

## (undated) DEVICE — ENDOPATH PNEUMONEEDLE INSUFFLATION NEEDLES WITH LUER LOCK CONNECTORS 120MM: Brand: ENDOPATH

## (undated) DEVICE — ENDOPATH XCEL BLADELESS TROCARS WITH STABILITY SLEEVES: Brand: ENDOPATH XCEL

## (undated) DEVICE — GLOVE SRG BIOGEL ECLIPSE 7.5

## (undated) DEVICE — WEBRIL 6 IN UNSTERILE

## (undated) DEVICE — COVIDIEN ENDO GIA PURPLE (MED) RELOAD 45MM

## (undated) DEVICE — ENDOPATH 5MM CURVED SCISSORS WITH MONOPOLAR CAUTERY: Brand: ENDOPATH

## (undated) DEVICE — NEEDLE SPINAL 20G X 3.5 LF

## (undated) DEVICE — TUBING SUCTION 5MM X 12 FT

## (undated) DEVICE — SYRINGE 20ML LL

## (undated) DEVICE — ALLENTOWN LAP CHOLE APP PACK: Brand: CARDINAL HEALTH

## (undated) DEVICE — CHLORAPREP HI-LITE 26ML ORANGE

## (undated) DEVICE — PAD GROUNDING ADULT

## (undated) DEVICE — SCD SEQUENTIAL COMPRESSION COMFORT SLEEVE LARGE KNEE LENGTH: Brand: KENDALL SCD

## (undated) DEVICE — TELFA NON-ADHERENT ABSORBENT DRESSING: Brand: TELFA

## (undated) DEVICE — LIGHT HANDLE COVER SLEEVE DISP BLUE STELLAR

## (undated) DEVICE — TROCAR: Brand: KII FIOS FIRST ENTRY

## (undated) DEVICE — GLOVE INDICATOR PI UNDERGLOVE SZ 7 BLUE

## (undated) DEVICE — POWER SHELL SIGNIA

## (undated) DEVICE — SMOKE REMOVAL SYSTEM: Brand: BOEHRINGER® SMOKE REMOVAL SYSTEM

## (undated) DEVICE — 3M™ TEGADERM™ TRANSPARENT FILM DRESSING FRAME STYLE, 1626W, 4 IN X 4-3/4 IN (10 CM X 12 CM), 50/CT 4CT/CASE: Brand: 3M™ TEGADERM™

## (undated) DEVICE — IRRIG ENDO FLO TUBING

## (undated) DEVICE — 3000CC GUARDIAN II: Brand: GUARDIAN

## (undated) DEVICE — GLOVE SRG BIOGEL ECLIPSE 8

## (undated) DEVICE — VISUALIZATION SYSTEM: Brand: CLEARIFY

## (undated) DEVICE — PENCIL ELECTROSURG E-Z CLEAN -0035H

## (undated) DEVICE — KERLIX BANDAGE ROLL: Brand: KERLIX

## (undated) DEVICE — ASTOUND STANDARD SURGICAL GOWN, XL: Brand: CONVERTORS

## (undated) DEVICE — TROCAR: Brand: KII® SLEEVE

## (undated) DEVICE — SUT VICRYL 0 18 IN J906G

## (undated) DEVICE — HARMONIC 1100 SHEARS, 36CM SHAFT LENGTH: Brand: HARMONIC